# Patient Record
Sex: MALE | Race: WHITE | NOT HISPANIC OR LATINO | Employment: OTHER | URBAN - METROPOLITAN AREA
[De-identification: names, ages, dates, MRNs, and addresses within clinical notes are randomized per-mention and may not be internally consistent; named-entity substitution may affect disease eponyms.]

---

## 2017-01-05 DIAGNOSIS — Z12.5 ENCOUNTER FOR SCREENING FOR MALIGNANT NEOPLASM OF PROSTATE: ICD-10-CM

## 2017-01-05 DIAGNOSIS — E78.5 HYPERLIPIDEMIA: ICD-10-CM

## 2017-01-05 DIAGNOSIS — K21.9 GASTRO-ESOPHAGEAL REFLUX DISEASE WITHOUT ESOPHAGITIS: ICD-10-CM

## 2017-01-05 DIAGNOSIS — Z00.00 ENCOUNTER FOR GENERAL ADULT MEDICAL EXAMINATION WITHOUT ABNORMAL FINDINGS: ICD-10-CM

## 2017-01-05 DIAGNOSIS — M72.2 PLANTAR FASCIAL FIBROMATOSIS: ICD-10-CM

## 2017-01-16 ENCOUNTER — APPOINTMENT (OUTPATIENT)
Dept: LAB | Facility: CLINIC | Age: 67
End: 2017-01-16
Payer: MEDICARE

## 2017-01-16 ENCOUNTER — TRANSCRIBE ORDERS (OUTPATIENT)
Dept: RADIOLOGY | Facility: CLINIC | Age: 67
End: 2017-01-16

## 2017-01-16 ENCOUNTER — TRANSCRIBE ORDERS (OUTPATIENT)
Dept: LAB | Facility: CLINIC | Age: 67
End: 2017-01-16

## 2017-01-16 DIAGNOSIS — K21.9 GASTRO-ESOPHAGEAL REFLUX DISEASE WITHOUT ESOPHAGITIS: ICD-10-CM

## 2017-01-16 DIAGNOSIS — Z12.5 ENCOUNTER FOR SCREENING FOR MALIGNANT NEOPLASM OF PROSTATE: ICD-10-CM

## 2017-01-16 DIAGNOSIS — E78.5 HYPERLIPIDEMIA: ICD-10-CM

## 2017-01-16 DIAGNOSIS — M72.2 PLANTAR FASCIAL FIBROMATOSIS: ICD-10-CM

## 2017-01-16 LAB
ALBUMIN SERPL BCP-MCNC: 3.7 G/DL (ref 3.5–5)
ALP SERPL-CCNC: 92 U/L (ref 46–116)
ALT SERPL W P-5'-P-CCNC: 161 U/L (ref 12–78)
AST SERPL W P-5'-P-CCNC: 69 U/L (ref 5–45)
BILIRUB DIRECT SERPL-MCNC: 0.2 MG/DL (ref 0–0.2)
BILIRUB SERPL-MCNC: 0.67 MG/DL (ref 0.2–1)
CHOLEST SERPL-MCNC: 116 MG/DL (ref 50–200)
HDLC SERPL-MCNC: 39 MG/DL (ref 40–60)
LDLC SERPL CALC-MCNC: 64 MG/DL (ref 0–100)
PROT SERPL-MCNC: 7.5 G/DL (ref 6.4–8.2)
PSA SERPL-MCNC: 0.4 NG/ML (ref 0–4)
TRIGL SERPL-MCNC: 65 MG/DL

## 2017-01-16 PROCEDURE — G0103 PSA SCREENING: HCPCS

## 2017-01-16 PROCEDURE — 80061 LIPID PANEL: CPT

## 2017-01-16 PROCEDURE — 80076 HEPATIC FUNCTION PANEL: CPT

## 2017-01-16 PROCEDURE — 36415 COLL VENOUS BLD VENIPUNCTURE: CPT

## 2017-01-19 ENCOUNTER — ALLSCRIPTS OFFICE VISIT (OUTPATIENT)
Dept: OTHER | Facility: OTHER | Age: 67
End: 2017-01-19

## 2017-01-30 ENCOUNTER — ALLSCRIPTS OFFICE VISIT (OUTPATIENT)
Dept: OTHER | Facility: OTHER | Age: 67
End: 2017-01-30

## 2017-02-03 ENCOUNTER — HOSPITAL ENCOUNTER (OUTPATIENT)
Dept: CT IMAGING | Facility: CLINIC | Age: 67
Discharge: HOME | End: 2017-02-03
Payer: COMMERCIAL

## 2017-02-03 ENCOUNTER — APPOINTMENT (OUTPATIENT)
Dept: LAB | Facility: CLINIC | Age: 67
End: 2017-02-03
Payer: MEDICARE

## 2017-02-03 DIAGNOSIS — Z00.00 ENCOUNTER FOR GENERAL ADULT MEDICAL EXAMINATION WITHOUT ABNORMAL FINDINGS: ICD-10-CM

## 2017-02-03 DIAGNOSIS — E78.5 HYPERLIPIDEMIA: ICD-10-CM

## 2017-02-03 LAB
ALBUMIN SERPL BCP-MCNC: 3.6 G/DL (ref 3.5–5)
ALP SERPL-CCNC: 70 U/L (ref 46–116)
ALT SERPL W P-5'-P-CCNC: 44 U/L (ref 12–78)
ANION GAP SERPL CALCULATED.3IONS-SCNC: 9 MMOL/L (ref 4–13)
AST SERPL W P-5'-P-CCNC: 24 U/L (ref 5–45)
BILIRUB SERPL-MCNC: 0.62 MG/DL (ref 0.2–1)
BUN SERPL-MCNC: 14 MG/DL (ref 5–25)
CALCIUM SERPL-MCNC: 9.5 MG/DL (ref 8.3–10.1)
CHLORIDE SERPL-SCNC: 101 MMOL/L (ref 100–108)
CO2 SERPL-SCNC: 27 MMOL/L (ref 21–32)
CREAT SERPL-MCNC: 0.86 MG/DL (ref 0.6–1.3)
GFR SERPL CREATININE-BSD FRML MDRD: >60 ML/MIN/1.73SQ M
GLUCOSE SERPL-MCNC: 96 MG/DL (ref 65–140)
POTASSIUM SERPL-SCNC: 4 MMOL/L (ref 3.5–5.3)
PROT SERPL-MCNC: 7.6 G/DL (ref 6.4–8.2)
SODIUM SERPL-SCNC: 137 MMOL/L (ref 136–145)

## 2017-02-03 PROCEDURE — 36415 COLL VENOUS BLD VENIPUNCTURE: CPT

## 2017-02-03 PROCEDURE — 80053 COMPREHEN METABOLIC PANEL: CPT

## 2017-02-04 ENCOUNTER — GENERIC CONVERSION - ENCOUNTER (OUTPATIENT)
Dept: OTHER | Facility: OTHER | Age: 67
End: 2017-02-04

## 2017-03-14 ENCOUNTER — APPOINTMENT (OUTPATIENT)
Dept: PHYSICAL THERAPY | Facility: CLINIC | Age: 67
End: 2017-03-14
Payer: MEDICARE

## 2017-03-15 ENCOUNTER — APPOINTMENT (OUTPATIENT)
Dept: PHYSICAL THERAPY | Facility: CLINIC | Age: 67
End: 2017-03-15
Payer: MEDICARE

## 2017-03-15 PROCEDURE — G8979 MOBILITY GOAL STATUS: HCPCS

## 2017-03-15 PROCEDURE — 97161 PT EVAL LOW COMPLEX 20 MIN: CPT

## 2017-03-15 PROCEDURE — 97110 THERAPEUTIC EXERCISES: CPT

## 2017-03-15 PROCEDURE — G8978 MOBILITY CURRENT STATUS: HCPCS

## 2017-03-21 ENCOUNTER — APPOINTMENT (OUTPATIENT)
Dept: PHYSICAL THERAPY | Facility: CLINIC | Age: 67
End: 2017-03-21
Payer: MEDICARE

## 2017-03-21 PROCEDURE — 97140 MANUAL THERAPY 1/> REGIONS: CPT

## 2017-03-21 PROCEDURE — 97110 THERAPEUTIC EXERCISES: CPT

## 2017-03-23 ENCOUNTER — APPOINTMENT (OUTPATIENT)
Dept: PHYSICAL THERAPY | Facility: CLINIC | Age: 67
End: 2017-03-23
Payer: MEDICARE

## 2017-03-23 PROCEDURE — 97140 MANUAL THERAPY 1/> REGIONS: CPT

## 2017-03-23 PROCEDURE — 97110 THERAPEUTIC EXERCISES: CPT

## 2017-03-29 ENCOUNTER — GENERIC CONVERSION - ENCOUNTER (OUTPATIENT)
Dept: OTHER | Facility: OTHER | Age: 67
End: 2017-03-29

## 2017-03-30 ENCOUNTER — APPOINTMENT (OUTPATIENT)
Dept: PHYSICAL THERAPY | Facility: CLINIC | Age: 67
End: 2017-03-30
Payer: MEDICARE

## 2017-03-30 PROCEDURE — 97140 MANUAL THERAPY 1/> REGIONS: CPT

## 2017-03-30 PROCEDURE — 97535 SELF CARE MNGMENT TRAINING: CPT

## 2017-03-30 PROCEDURE — 97110 THERAPEUTIC EXERCISES: CPT

## 2017-04-03 ENCOUNTER — APPOINTMENT (OUTPATIENT)
Dept: PHYSICAL THERAPY | Facility: CLINIC | Age: 67
End: 2017-04-03
Payer: MEDICARE

## 2017-04-03 PROCEDURE — 97140 MANUAL THERAPY 1/> REGIONS: CPT

## 2017-04-03 PROCEDURE — 97110 THERAPEUTIC EXERCISES: CPT

## 2017-04-06 ENCOUNTER — APPOINTMENT (OUTPATIENT)
Dept: PHYSICAL THERAPY | Facility: CLINIC | Age: 67
End: 2017-04-06
Payer: MEDICARE

## 2017-04-06 PROCEDURE — 97110 THERAPEUTIC EXERCISES: CPT

## 2017-04-06 PROCEDURE — 97140 MANUAL THERAPY 1/> REGIONS: CPT

## 2017-04-11 ENCOUNTER — APPOINTMENT (OUTPATIENT)
Dept: PHYSICAL THERAPY | Facility: CLINIC | Age: 67
End: 2017-04-11
Payer: MEDICARE

## 2017-04-11 PROCEDURE — 97110 THERAPEUTIC EXERCISES: CPT

## 2017-04-11 PROCEDURE — 97140 MANUAL THERAPY 1/> REGIONS: CPT

## 2017-04-12 ENCOUNTER — APPOINTMENT (OUTPATIENT)
Dept: PHYSICAL THERAPY | Facility: CLINIC | Age: 67
End: 2017-04-12
Payer: COMMERCIAL

## 2017-04-12 PROCEDURE — L3010 FOOT LONGITUDINAL ARCH SUPPO: HCPCS | Performed by: PHYSICAL THERAPIST

## 2017-04-13 ENCOUNTER — APPOINTMENT (OUTPATIENT)
Dept: PHYSICAL THERAPY | Facility: CLINIC | Age: 67
End: 2017-04-13
Payer: MEDICARE

## 2017-04-13 PROCEDURE — G8978 MOBILITY CURRENT STATUS: HCPCS

## 2017-04-13 PROCEDURE — 97164 PT RE-EVAL EST PLAN CARE: CPT

## 2017-04-13 PROCEDURE — 97140 MANUAL THERAPY 1/> REGIONS: CPT

## 2017-04-13 PROCEDURE — G8979 MOBILITY GOAL STATUS: HCPCS

## 2017-04-13 PROCEDURE — 97110 THERAPEUTIC EXERCISES: CPT

## 2017-04-18 ENCOUNTER — APPOINTMENT (OUTPATIENT)
Dept: PHYSICAL THERAPY | Facility: CLINIC | Age: 67
End: 2017-04-18
Payer: MEDICARE

## 2017-04-18 PROCEDURE — 97110 THERAPEUTIC EXERCISES: CPT

## 2017-04-18 PROCEDURE — 97140 MANUAL THERAPY 1/> REGIONS: CPT

## 2017-04-24 ENCOUNTER — APPOINTMENT (OUTPATIENT)
Dept: PHYSICAL THERAPY | Facility: CLINIC | Age: 67
End: 2017-04-24
Payer: MEDICARE

## 2017-04-24 PROCEDURE — 97140 MANUAL THERAPY 1/> REGIONS: CPT

## 2017-04-24 PROCEDURE — 97110 THERAPEUTIC EXERCISES: CPT

## 2017-04-27 ENCOUNTER — APPOINTMENT (OUTPATIENT)
Dept: PHYSICAL THERAPY | Facility: CLINIC | Age: 67
End: 2017-04-27
Payer: MEDICARE

## 2017-04-27 PROCEDURE — 97110 THERAPEUTIC EXERCISES: CPT

## 2017-04-27 PROCEDURE — 97140 MANUAL THERAPY 1/> REGIONS: CPT

## 2017-05-31 ENCOUNTER — GENERIC CONVERSION - ENCOUNTER (OUTPATIENT)
Dept: OTHER | Facility: OTHER | Age: 67
End: 2017-05-31

## 2017-06-05 ENCOUNTER — APPOINTMENT (OUTPATIENT)
Dept: PHYSICAL THERAPY | Facility: CLINIC | Age: 67
End: 2017-06-05
Payer: MEDICARE

## 2017-06-05 PROCEDURE — 97110 THERAPEUTIC EXERCISES: CPT

## 2017-06-05 PROCEDURE — 97140 MANUAL THERAPY 1/> REGIONS: CPT

## 2017-06-05 PROCEDURE — G8978 MOBILITY CURRENT STATUS: HCPCS

## 2017-06-05 PROCEDURE — G8979 MOBILITY GOAL STATUS: HCPCS

## 2017-06-05 PROCEDURE — 97164 PT RE-EVAL EST PLAN CARE: CPT

## 2017-06-08 ENCOUNTER — APPOINTMENT (OUTPATIENT)
Dept: PHYSICAL THERAPY | Facility: CLINIC | Age: 67
End: 2017-06-08
Payer: MEDICARE

## 2017-06-08 PROCEDURE — 97110 THERAPEUTIC EXERCISES: CPT

## 2017-06-08 PROCEDURE — 97140 MANUAL THERAPY 1/> REGIONS: CPT

## 2017-06-12 ENCOUNTER — APPOINTMENT (OUTPATIENT)
Dept: PHYSICAL THERAPY | Facility: CLINIC | Age: 67
End: 2017-06-12
Payer: MEDICARE

## 2017-06-12 PROCEDURE — 97110 THERAPEUTIC EXERCISES: CPT

## 2017-06-12 PROCEDURE — 97140 MANUAL THERAPY 1/> REGIONS: CPT

## 2017-06-15 ENCOUNTER — APPOINTMENT (OUTPATIENT)
Dept: PHYSICAL THERAPY | Facility: CLINIC | Age: 67
End: 2017-06-15
Payer: MEDICARE

## 2017-06-15 PROCEDURE — 97110 THERAPEUTIC EXERCISES: CPT

## 2017-06-15 PROCEDURE — 97140 MANUAL THERAPY 1/> REGIONS: CPT

## 2017-06-16 ENCOUNTER — ALLSCRIPTS OFFICE VISIT (OUTPATIENT)
Dept: OTHER | Facility: OTHER | Age: 67
End: 2017-06-16

## 2017-06-20 ENCOUNTER — APPOINTMENT (OUTPATIENT)
Dept: PHYSICAL THERAPY | Facility: CLINIC | Age: 67
End: 2017-06-20
Payer: MEDICARE

## 2017-06-20 PROCEDURE — 97110 THERAPEUTIC EXERCISES: CPT

## 2017-06-20 PROCEDURE — 97140 MANUAL THERAPY 1/> REGIONS: CPT

## 2017-06-22 ENCOUNTER — APPOINTMENT (OUTPATIENT)
Dept: PHYSICAL THERAPY | Facility: CLINIC | Age: 67
End: 2017-06-22
Payer: MEDICARE

## 2017-06-22 PROCEDURE — 97110 THERAPEUTIC EXERCISES: CPT

## 2017-06-22 PROCEDURE — 97140 MANUAL THERAPY 1/> REGIONS: CPT

## 2017-06-29 ENCOUNTER — APPOINTMENT (OUTPATIENT)
Dept: PHYSICAL THERAPY | Facility: CLINIC | Age: 67
End: 2017-06-29
Payer: MEDICARE

## 2017-06-29 PROCEDURE — 97140 MANUAL THERAPY 1/> REGIONS: CPT

## 2017-06-29 PROCEDURE — 97110 THERAPEUTIC EXERCISES: CPT

## 2017-06-30 ENCOUNTER — APPOINTMENT (OUTPATIENT)
Dept: PHYSICAL THERAPY | Facility: CLINIC | Age: 67
End: 2017-06-30
Payer: MEDICARE

## 2017-06-30 PROCEDURE — G8979 MOBILITY GOAL STATUS: HCPCS

## 2017-06-30 PROCEDURE — 97164 PT RE-EVAL EST PLAN CARE: CPT

## 2017-06-30 PROCEDURE — G8978 MOBILITY CURRENT STATUS: HCPCS

## 2017-06-30 PROCEDURE — 97140 MANUAL THERAPY 1/> REGIONS: CPT

## 2017-07-06 ENCOUNTER — APPOINTMENT (OUTPATIENT)
Dept: PHYSICAL THERAPY | Facility: CLINIC | Age: 67
End: 2017-07-06
Payer: MEDICARE

## 2017-07-06 PROCEDURE — 97140 MANUAL THERAPY 1/> REGIONS: CPT

## 2017-07-06 PROCEDURE — 97110 THERAPEUTIC EXERCISES: CPT

## 2017-07-10 ENCOUNTER — APPOINTMENT (OUTPATIENT)
Dept: PHYSICAL THERAPY | Facility: CLINIC | Age: 67
End: 2017-07-10
Payer: MEDICARE

## 2017-07-10 PROCEDURE — 97140 MANUAL THERAPY 1/> REGIONS: CPT

## 2017-07-10 PROCEDURE — 97110 THERAPEUTIC EXERCISES: CPT

## 2017-07-14 ENCOUNTER — APPOINTMENT (OUTPATIENT)
Dept: PHYSICAL THERAPY | Facility: CLINIC | Age: 67
End: 2017-07-14
Payer: MEDICARE

## 2017-07-14 PROCEDURE — 97140 MANUAL THERAPY 1/> REGIONS: CPT

## 2017-07-14 PROCEDURE — 97110 THERAPEUTIC EXERCISES: CPT

## 2018-01-05 ENCOUNTER — GENERIC CONVERSION - ENCOUNTER (OUTPATIENT)
Dept: OTHER | Facility: OTHER | Age: 68
End: 2018-01-05

## 2018-01-10 NOTE — PROGRESS NOTES
Assessment    1  Encounter for preventive health examination (V70 0) (Z00 00)   2  Benign colon polyp (211 3) (K63 5)    Plan  Benign colon polyp, Encounter for screening colonoscopy    · COLONOSCOPY; Status:Active; Requested LII:04KGP0450;    · 2 - Valery Marr MD  (Gastroenterology) Physician Referral  Consult Only: the  expectation is that the referring provider will communicate back to the patient on  treatment options  Evaluation and Treatment: the expectation is that the referred to  provider will communicate back to the patient on treatment options  Status: Hold For  - Scheduling  Requested for: 23VEO3175  Care Summary provided  : Yes  Health Maintenance    · Alcohol misuse can be a problem with advancing age ; Status:Complete;   Done:  15FLA0176   · Always use a seat belt and shoulder strap when riding or driving a motor vehicle ;  Status:Complete;   Done: 98CHT3093   · Take steps to avoid hypothermia ; Status:Complete;   Done: 67AYA1202   · There are many exercise options for seniors ; Status:Complete;   Done: 95PHZ6440   · These are things you can do to prevent falls in and around the home ; Status:Complete;    Done: 57BIZ2764   · Use a sun block product with an SPF of 15 or more ; Status:Complete;   Done:  62PME6920   · We encourage all of our patients to exercise regularly  30 minutes of exercise or physical  activity five or more days a week is recommended for children and adults ;  Status:Complete;   Done: 08ODT9363   · We recommend routine visits to a dentist ; Status:Complete;   Done: 44RSU0465   · We recommend that you create an advance directive ; Status:Complete;   Done:  82GKC3036   · We recommend that you follow the "Mediterranean diet "; Status:Complete;   Done:  18CVR5481   · We recommend that you follow these rules for gun safety ; Status:Complete;   Done:  08QZA1713   · We recommend that you follow these steps to lower your risk of osteoporosis  ;  Status:Complete;   Done: 04FYB2706   · We recommend you modify your diet to achieve and maintain a healthy weight  Being  underweight may increase your risk of developing health problems from vitamin and  mineral deficiencies  We recommend a balanced diet rich in fruits and vegetables  You  may also consider increasing your calorie intake by eating more frequently or adding  nuts, avocados, and low-fat cheese or milk to your meals  Please let us know  if you would like to learn more about your nutrition and calorie needs, and additional  options to help you achieve your weight goals ; Status:Complete;   Done: 41JNJ8337   · Call [phone] if: You have any warning signs of skin cancer ; Status:Complete;    Done: 84ZED0078   · Call 911 if: You experience a new kind of chest pain (angina) or pressure ;  Status:Complete;   Done: 10FBS9823   · * CT LUNG SCREENING PROGRAM; Status:Hold For - Scheduling; Requested  for:19Jan2017;    · Follow-up visit in 6 months Evaluation and Treatment  Follow-up  Status: Hold For -  Scheduling  Requested for: 42DSM4996  Hyperlipidemia    · (1) COMPREHENSIVE METABOLIC PANEL; Status:Active; Requested CJZ:78LHX0986;   SocHx: Former smoker, GERD without esophagitis    · EGD; Status:Hold For - Scheduling; Requested for:19Jan2017;     Discussion/Summary    70-year-old male here today for initial preventative physical examination  He has had all the usual preventive services with the exception of colonoscopy and I am ordering  He declines a flu shot his other immunizations are up-to-date  I'm ordering an EGD as she has chronic GERD and history of smoking  I reviewed his recent medications and I am ordering a comprehensive metabolic panel because of an elevated liver enzyme on his recent hepatic panel  Examination performed  He follows with the urologist  I reviewed his medications we'll make no change  Will reappoint in 6 months for checkup 12 months for annual wellness visit  Impression: Welcome to Medicare Visit  Cardiovascular screening and counseling: the risks and benefits of screening were discussed, screening is current and EKG recommended  Diabetes screening and counseling: the risks and benefits of screening were discussed and screening is current  Colorectal cancer screening and counseling: the risks and benefits of screening were discussed and due for a colonoscopy (low risk)  Prostate cancer screening and counseling: the risks and benefits of screening were discussed and screening is current  Osteoporosis screening and counseling: the risks and benefits of screening were discussed and screening not indicated  Abdominal aortic aneurysm screening and counseling: the risks and benefits of screening were discussed and screening is current  Glaucoma screening and counseling: the risks and benefits of screening were discussed and screening is current  HIV screening and counseling: the risks and benefits of screening were discussed and screening not indicated  Immunizations: the risks and benefits of influenza vaccination were discussed with the patient, the patient declines the influenza vaccination, the risks and benefits of pneumococcal vaccination were discussed with the patient, will be due in a year for her #2, hepatitis B prevention counseling was provided, hepatitis B vaccination series is not indicated at this time due to the patient's low risk of abi the disease, the risks and benefits of the Zostavax vaccine were discussed with the patient, Zostavax vaccination up to date, the risks and benefits of the Tdap vaccine were discussed with the patient and Tdap vaccination up to date  Advance Directive Planning: complete and up to date  Patient Discussion: follow-up visit needed in 6  Chief Complaint  Welcome to Medicare Wellness exam 73year old  Advance Directives  Advance Directive [facility]:   YES - Patient has an advance health care directive     The patient has a living will located  a scanned copy is in the patient's chart  Durable Power of  For Healthcare:    Name: shey   Relationship: spouse      History of Present Illness  Welcome to [employer] and Wellness Visits: The patient is being seen for the welcome to medicare visit  Medicare Screening and Risk Factors   Hospitalizations: he has been previously hospitalizied and he has been hospitalized 11/16 fascial release times  Once per lifetime medicare screening tests: ECG (1o/28 16 wnl) and AAA screening US (3/11/15 wnl)  Medicare Screening Tests Risk Questions   Abdominal aortic aneurysm risk assessment: none indicated  Osteoporosis risk assessment: none indicated  HIV risk assessment: none indicated  Drug and Alcohol Use: The patient is a former cigarette smoker  The patient reports drinking 3 drinks per day  He has never used illicit drugs  Diet and Physical Activity: Current diet includes well balanced meals  He exercises 3 times per week  Exercise: walking, swimming, strength training 3 hours per week  Mood Disorder and Cognitive Impairment Screening: Anxiety screening none  He denies feeling down, depressed, or hopeless over the past two weeks  He denies feeling little interest or pleasure in doing things over the past two weeks  Cognitive impairment screening: denies difficulty learning/retaining new information, denies difficulty handling complex tasks, denies difficulty with reasoning, denies difficulty with spatial ability and orientation, denies difficulty with language and denies difficulty with behavior  Functional Ability/Level of Safety: Hearing is normal bilaterally  The patient is currently able to do activities of daily living without limitations, able to do instrumental activities of daily living without limitations, able to participate in social activities without limitations and able to drive without limitations   Activities of daily living details: does not need help using the phone, no transportation help needed, does not need help shopping, no meal preparation help needed, does not need help doing laundry, does not need help managing medications and does not need help managing money  Fall risk factors: The patient fell 0 times in the past 12 months , no polypharmacy, no alcohol use, no mobility impairment, no antidepressant use, no deconditioning, no postural hypotension, no sedative use, no visual impairment, no urinary incontinence, no antihypertensive use, no cognitive impairment and no previous fall  Home safety risk factors:  no unfamiliar surroundings, no loose rugs, no poor household lighting, no uneven floors, no household clutter, grab bars in the bathroom and handrails on the stairs  Advance Directives: Advance directives: living will, durable power of  for health care directives and advance directives  end of life decisions were reviewed with the patient and I agree with the patient's decisions  Co-Managers and Medical Equipment/Suppliers: See Patient Care Team   Reviewed Updated [facility]:   Last Medicare Wellness Visit Information was reviewed, patient interviewed, no change since last AWV  Patient Care Team    Care Team Member Role Specialty Office Number   Oleg LESLIE  Orthopedic Surgery [phone]   Jasmine Adam MD  Urology [phone], Jae Servin MD  Orthopedic Surgery [phone]     Review of Systems    Constitutional: negative  Head and Face: negative  Eyes: negative  ENT: negative  Cardiovascular: negative  Respiratory: negative  Gastrointestinal: negative  Genitourinary: negative  Musculoskeletal: negative  Integumentary and Breasts: negative  Neurological: negative  Psychiatric: negative  Endocrine: negative  Hematologic and Lymphatic: negative  Active Problems    1  GERD without esophagitis (530 81) (K21 9)   2  Hyperlipidemia (272 4) (E78 5)   3  Peyronie's disease (607 85) (N48 6)   4   Plantar fascial fibromatosis (728 71) (M72 2)   5  Plantar fasciitis (728 71) (M72 2)    Past Medical History    · 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)   · History of Benign prostatic hypertrophy (600 00) (N40 0)   · History of Bilateral hand numbness (782 0) (R20 0)   · History of Carpal tunnel syndrome of left wrist (354 0) (G56 02)   · History of Carpal tunnel syndrome of right wrist (354 0) (G56 01)   · History of Contact dermatitis due to poison ivy (692 6) (L23 7)   · History of Ganglion (727 43) (M67 40)   · History of abdominal pain (V13 89) (O16 359)   · History of acute bronchitis (V12 69) (Z87 09)   · History of acute bronchitis (V12 69) (Z87 09)   · History of acute sinusitis (V12 69) (Z87 09)   · History of bladder infections (V13 02) (Z87 440)   · History of chronic prostatitis (V13 89) (M96 694)   · History of impacted cerumen (V12 49) (Z86 69)   · History of urethral stricture (V13 09) (Z87 448)   · History of urethral stricture (V13 09) (Z87 448)   · History of urinary tract infection (V13 02) (Z87 440)   · History of Impingement syndrome of right shoulder (726 2) (M75 41)   · History of Left inguinal hernia (550 90) (K40 90)   · History of Pneumonia of left upper lobe due to infectious organism (486) (J18 1)   · Right shoulder pain (719 41) (M25 511)   · History of Sexual dysfunction (302 70) (R37)   · History of Urinary complication (010 1) (K76 87)    The active problems and past medical history were reviewed and updated today  Surgical History    · History of Cystoscopy For Urethral Stricture   · History of Diagnostic Cystoscopy   · History of Hernia Repair   · History of Neuroplasty Decompression Median Nerve At Carpal Tunnel    The surgical history was reviewed and updated today         Family History  Mother    · Family history of Family Health Status Of Mother -   Father    · Family history of Bladder Cancer (V16 52)   · Family history of Family Health Status Of Father -     The family history was reviewed and updated today  Social History    · Being A Social Drinker   · History of Former smoker (V01 87) (A15 399)   · Former smoker (V15 82) (T33 451)   · Marital History - Currently    · No drug use   · Working Full Time  The social history was reviewed and updated today  The social history was reviewed and is unchanged  Current Meds   1  Esomeprazole Magnesium 40 MG Oral Capsule Delayed Release; TAKE 1 CAPSULE   DAILY AS NEEDED; Therapy: 71JIW9327 to (Evaluate:2017)  Requested for: 97YGJ8249; Last   Rx:57Tdn4204 Ordered   2  Simvastatin 40 MG Oral Tablet; TAKE 1 TABLET BY MOUTH DAILY AT BEDTIME; Therapy: 13PBL7872 to (Evaluate:16Lam6103)  Requested for: 29Kpi4210; Last   Rx:75Ecu1086 Ordered    The medication list was reviewed and updated today  Allergies    1  No Known Drug Allergies    2  No Known Environmental Allergies   3  No Known Food Allergies    Immunizations   1 2    Influenza  Temporarily Deferred: Pt refuses Temporarily Deferred: Pt refuses, As of: 25WFS2219, Defer for 1 Years    PPSV  2016     Tdap  2012     Zoster  08-Sep-2014      Vitals  Signs    Systolic: 363  Diastolic: 80   Heart Rate: 68  Systolic: 034, RUE, Sitting  Diastolic: 94, RUE, Sitting  Height: 5 ft 9 in  Weight: 230 lb 8 0 oz  BMI Calculated: 34 04  BSA Calculated: 2 19    Physical Exam    Constitutional   General appearance: Abnormal   overweight and appearance reflects stated age  Head and Face   Head and face: Normal     Palpation of the face and sinuses: No sinus tenderness  Eyes   Conjunctiva and lids: No erythema, swelling or discharge  Pupils and irises: Equal, round, reactive to light  Ears, Nose, Mouth, and Throat   External inspection of ears and nose: Normal     Otoscopic examination: Tympanic membranes translucent with normal light reflex  Canals patent without erythema      Hearing: Normal     Nasal mucosa, septum, and turbinates: Normal without edema or erythema  Lips, teeth, and gums: Normal, good dentition  Oropharynx: Normal with no erythema, edema, exudate or lesions  Neck   Neck: Supple, symmetric, trachea midline, no masses  Thyroid: Normal, no thyromegaly  Pulmonary   Auscultation of lungs: Clear to auscultation  Cardiovascular   Auscultation of heart: Normal rate and rhythm, normal S1 and S2, no murmurs  Carotid pulses: 2+ bilaterally  Abdominal aorta: Normal     Femoral pulses: 2+ bilaterally  Pedal pulses: 2+ bilaterally  Peripheral vascular exam: Normal     Examination of extremities for edema and/or varicosities: Normal     Abdomen   Abdomen: Non-tender, no masses  Liver and spleen: No hepatomegaly or splenomegaly  Examination for hernias: No hernias appreciated  Genitourinary   Scrotal contents: Normal testes, no masses  Penis: Normal, no lesions  Lymphatic   Palpation of lymph nodes in neck: No lymphadenopathy  Palpation of lymph nodes in axillae: No lymphadenopathy  Palpation of lymph nodes in groin: No lymphadenopathy  Palpation of lymph nodes in other areas: No lymphadenopathy  Musculoskeletal   Gait and station: Normal     Inspection/palpation of digits and nails: Normal without clubbing or cyanosis  Inspection/palpation of joints, bones, and muscles: Normal     Range of motion: Normal     Stability: Normal     Muscle strength/tone: Normal     Skin   Skin and subcutaneous tissue: Normal without rashes or lesions  Palpation of skin and subcutaneous tissue: Normal turgor  Neurologic   Cranial nerves: Cranial nerves 2-12 intact  Cortical function: Normal mental status  Reflexes: 2+ and symmetric  Sensation: No sensory loss  Coordination: Normal finger to nose and heel to shin  Psychiatric   Judgment and insight: Normal     Orientation to person, place and time: Normal     Recent and remote memory: Intact      Mood and affect: Normal  Signatures   Electronically signed by : ROSCOE Camara ; Jan 19 2017  4:37PM EST                       (Author)

## 2018-01-11 ENCOUNTER — TRANSCRIBE ORDERS (OUTPATIENT)
Dept: ADMINISTRATIVE | Facility: HOSPITAL | Age: 68
End: 2018-01-11

## 2018-01-11 ENCOUNTER — HOSPITAL ENCOUNTER (OUTPATIENT)
Dept: NON INVASIVE DIAGNOSTICS | Facility: HOSPITAL | Age: 68
Discharge: HOME | End: 2018-01-11
Attending: ORTHOPAEDIC SURGERY
Payer: MEDICARE

## 2018-01-11 ENCOUNTER — LAB (OUTPATIENT)
Dept: LAB | Facility: HOSPITAL | Age: 68
End: 2018-01-11
Attending: ORTHOPAEDIC SURGERY
Payer: MEDICARE

## 2018-01-11 ENCOUNTER — GENERIC CONVERSION - ENCOUNTER (OUTPATIENT)
Dept: OTHER | Facility: OTHER | Age: 68
End: 2018-01-11

## 2018-01-11 DIAGNOSIS — G56.02 CARPAL TUNNEL SYNDROME ON LEFT: Primary | ICD-10-CM

## 2018-01-11 DIAGNOSIS — G56.02 CARPAL TUNNEL SYNDROME ON LEFT: ICD-10-CM

## 2018-01-11 LAB
ANION GAP SERPL CALCULATED.3IONS-SCNC: 1 MMOL/L (ref 4–13)
BASOPHILS # BLD AUTO: 0.05 THOUSANDS/ΜL (ref 0–0.1)
BASOPHILS NFR BLD AUTO: 1 % (ref 0–1)
BUN SERPL-MCNC: 17 MG/DL (ref 5–25)
CALCIUM SERPL-MCNC: 9 MG/DL (ref 8.3–10.1)
CHLORIDE SERPL-SCNC: 102 MMOL/L (ref 100–108)
CO2 SERPL-SCNC: 32 MMOL/L (ref 21–32)
CREAT SERPL-MCNC: 0.92 MG/DL (ref 0.6–1.3)
EOSINOPHIL # BLD AUTO: 0.09 THOUSAND/ΜL (ref 0–0.61)
EOSINOPHIL NFR BLD AUTO: 1 % (ref 0–6)
ERYTHROCYTE [DISTWIDTH] IN BLOOD BY AUTOMATED COUNT: 13.8 % (ref 11.6–15.1)
GFR SERPL CREATININE-BSD FRML MDRD: 86 ML/MIN/1.73SQ M
GLUCOSE P FAST SERPL-MCNC: 99 MG/DL (ref 65–99)
HCT VFR BLD AUTO: 45.8 % (ref 36.5–49.3)
HGB BLD-MCNC: 15.3 G/DL (ref 12–17)
LYMPHOCYTES # BLD AUTO: 2.69 THOUSANDS/ΜL (ref 0.6–4.47)
LYMPHOCYTES NFR BLD AUTO: 35 % (ref 14–44)
MCH RBC QN AUTO: 31.2 PG (ref 26.8–34.3)
MCHC RBC AUTO-ENTMCNC: 33.4 G/DL (ref 31.4–37.4)
MCV RBC AUTO: 93 FL (ref 82–98)
MONOCYTES # BLD AUTO: 0.64 THOUSAND/ΜL (ref 0.17–1.22)
MONOCYTES NFR BLD AUTO: 8 % (ref 4–12)
NEUTROPHILS # BLD AUTO: 4.13 THOUSANDS/ΜL (ref 1.85–7.62)
NEUTS SEG NFR BLD AUTO: 55 % (ref 43–75)
PLATELET # BLD AUTO: 265 THOUSANDS/UL (ref 149–390)
PMV BLD AUTO: 10.5 FL (ref 8.9–12.7)
POTASSIUM SERPL-SCNC: 4 MMOL/L (ref 3.5–5.3)
RBC # BLD AUTO: 4.91 MILLION/UL (ref 3.88–5.62)
SODIUM SERPL-SCNC: 135 MMOL/L (ref 136–145)
WBC # BLD AUTO: 7.6 THOUSAND/UL (ref 4.31–10.16)

## 2018-01-11 PROCEDURE — 93005 ELECTROCARDIOGRAM TRACING: CPT

## 2018-01-11 PROCEDURE — 36415 COLL VENOUS BLD VENIPUNCTURE: CPT

## 2018-01-11 PROCEDURE — 80048 BASIC METABOLIC PNL TOTAL CA: CPT

## 2018-01-11 PROCEDURE — 85025 COMPLETE CBC W/AUTO DIFF WBC: CPT

## 2018-01-12 VITALS
WEIGHT: 229 LBS | HEART RATE: 68 BPM | DIASTOLIC BLOOD PRESSURE: 80 MMHG | HEIGHT: 69 IN | SYSTOLIC BLOOD PRESSURE: 130 MMHG | TEMPERATURE: 98 F | BODY MASS INDEX: 33.92 KG/M2

## 2018-01-12 LAB
ATRIAL RATE: 85 BPM
P AXIS: 32 DEGREES
PR INTERVAL: 148 MS
QRS AXIS: 48 DEGREES
QRSD INTERVAL: 70 MS
QT INTERVAL: 376 MS
QTC INTERVAL: 447 MS
T WAVE AXIS: 58 DEGREES
VENTRICULAR RATE: 85 BPM

## 2018-01-14 VITALS
SYSTOLIC BLOOD PRESSURE: 130 MMHG | BODY MASS INDEX: 34.14 KG/M2 | DIASTOLIC BLOOD PRESSURE: 80 MMHG | HEIGHT: 69 IN | HEART RATE: 68 BPM | WEIGHT: 230.5 LBS

## 2018-01-15 VITALS
BODY MASS INDEX: 34.36 KG/M2 | RESPIRATION RATE: 17 BRPM | HEIGHT: 69 IN | DIASTOLIC BLOOD PRESSURE: 80 MMHG | TEMPERATURE: 97.8 F | SYSTOLIC BLOOD PRESSURE: 130 MMHG | HEART RATE: 72 BPM | WEIGHT: 232 LBS

## 2018-01-16 NOTE — PROGRESS NOTES
Assessment    1  Acute bronchitis (466 0) (J20 9)    Plan  Acute bronchitis    · Cefuroxime Axetil 500 MG Oral Tablet; Take 1 tablet twice daily   · Hydromet 5-1 5 MG/5ML Oral Syrup; TAKE 5 ML EVERY 4 TO 6 HOURS AS  NEEDED   · Drink at least 6 glasses of clear liquids a day ; Status:Complete;   Done: 70CCM4655   · Use a cool mist humidifier in the room ; Status:Complete;   Done: 69HJA0984   · Call [phone] if: The cough is not gone in 10 days ; Status:Complete;   Done:  78SPZ4288    Discussion/Summary    Patient has acute bronchitis  He will rest and drink lots of fluids I'm giving him an antibiotic and a cough suppressant  He will call me if he is not getting better  Chief Complaint  Coughing, chest discomfort, some shortness of breath noticed in last 3 days  History of Present Illness  HPI: Started several days ago with upper respiratory symptoms  Sore throat congestion and now has a persisting cough      Review of Systems    Constitutional: fever  ENT: nosebleeds  Cardiovascular: no chest pain  Respiratory: cough  Musculoskeletal: no complaints of arthralgia, no myalgia, no joint swelling or stiffness, no limb pain or swelling  Integumentary: no complaints of skin rash or lesion, no itching or dry skin, no skin wounds  Active Problems    1  Esophageal reflux (530 81) (K21 9)   2  Hyperlipidemia (272 4) (E78 5)   3  Peyronie's disease (607 85) (N48 6)    Past Medical History    1  History of Benign prostatic hypertrophy (600 00) (N40 0)   2  History of Ganglion (727 43) (M67 40)   3  History of abdominal pain (V13 89) (Z87 898)   4  History of acute bronchitis (V12 69) (Z87 09)   5  History of acute sinusitis (V12 69) (Z87 09)   6  History of bladder infections (V13 02) (Z87 440)   7  History of chronic prostatitis (V13 89) (Z87 438)   8  History of impacted cerumen (V12 49) (Z86 69)   9  History of urethral stricture (V13 09) (Z87 448)   10   History of urethral stricture (V13 09) (Z87 448)   11  History of urinary tract infection (V13 02) (Z87 440)   12  History of Left inguinal hernia (550 90) (K40 90)   13  History of Sexual dysfunction (302 70) (R37)   14  History of Special screening examination for neoplasm of prostate (V76 44) (Z12 5)   15  History of Urinary complication (490 5) (D61 32)   16  History of Vaccine for VZV (varicella-zoster virus) (V05 4) (Z23)    Family History    1  Family history of Family Health Status Of Mother -     2  Family history of Bladder Cancer (V16 52)   3  Family history of Family Health Status Of Father -   Family History Reviewed: The family history was reviewed and updated today  Social History    · Being A Social Drinker   · Former smoker (B20 53) (Q34 144)   · Marital History - Currently    · No drug use   · Working Full Time  The social history was reviewed and updated today  The social history was reviewed and is unchanged  Surgical History    1  History of Cystoscopy For Urethral Stricture   2  History of Diagnostic Cystoscopy   3  History of Hernia Repair    Current Meds   1  NexIUM 40 MG Oral Capsule Delayed Release; one daily before breakfast;   Therapy: 23HDK9140 to (Evaluate:19Xjx3606)  Requested for: 11XCB4937; Last   Rx:2016 Ordered   2  Simvastatin 40 MG Oral Tablet; TAKE 1 TABLET BY MOUTH DAILY AT BEDTIME; Therapy: 90YDY6581 to (Sebastien Grewal)  Requested for: 85GVA3513; Last   Rx:2016 Ordered    Allergies    1  No Known Drug Allergies    2  No Known Environmental Allergies   3  No Known Food Allergies    Vitals   Recorded: 62EJX4106 10:56AM   Heart Rate 80   Systolic 033, LUE, Sitting   Diastolic 90, LUE, Sitting   Height 5 ft 9 in   Weight 223 lb 6 08 oz   BMI Calculated 32 99   BSA Calculated 2 16     Physical Exam    Constitutional   General appearance: Abnormal   acutely ill and appearance reflects stated age     Ears, Nose, Mouth, and Throat   External inspection of ears and nose: Normal     Nasal mucosa, septum, and turbinates: Normal without edema or erythema  Oropharynx: Abnormal   The posterior pharynx was erythematous  Pulmonary   Auscultation of lungs: Abnormal   Auscultation of the lungs revealed decreased breath sounds diffusely  diffuse rhonchi bilaterally  Cardiovascular   Auscultation of heart: Normal rate and rhythm, normal S1 and S2, without murmurs  Examination of extremities for edema and/or varicosities: Normal     Carotid pulses: Normal     Abdomen   Abdomen: Non-tender, no masses  Liver and spleen: No hepatomegaly or splenomegaly  Future Appointments    Date/Time Provider Specialty Site   04/06/2016 11:00 AM ROSCOE Tesfaye  [facility] [place]   05/20/2016 08:30 AM ROSCOE Oconnell   Urology [address] [place] [address]     Signatures   Electronically signed by : ROSCOE Mehta ; Jan 26 2016 11:34AM EST                       (Author)

## 2018-01-18 NOTE — MISCELLANEOUS
Message   Recorded as Task   Date: 02/03/2017 04:16 PM, Created By: Micki Morton   Task Name: Medical Complaint Callback   Assigned To: Yahaira Diaz   Regarding Patient: Manuel Quach, Status: Active   CommentVasile Murray - 03 Feb 2017 4:16 PM     TASK CREATED  Tell and his CT lung scan was okay  Tell him we should do this again next year   Indiahomaisabel Ga - 03 Feb 2017 4:30 PM     TASK EDITED  Left message for patient to call the office on Saturday morning  Yahaira Graham - 04 Feb 2017 8:12 AM     TASK EDITED  Patient advised  PLM        Active Problems    1  Acute laryngotracheitis (464 20) (J04 2)   2  Benign colon polyp (211 3) (K63 5)   3  Encounter for screening colonoscopy (V76 51) (Z12 11)   4  Former smoker (V15 82) (V04 871)   5  GERD without esophagitis (530 81) (K21 9)   6  Hyperlipidemia (272 4) (E78 5)   7  Peyronie's disease (607 85) (N48 6)   8  Plantar fascial fibromatosis (728 71) (M72 2)   9  Plantar fasciitis (728 71) (M72 2)    Current Meds   1  Cefuroxime Axetil 500 MG Oral Tablet; Take 1 tablet twice daily; Therapy: 18JWF1390 to (Last Rx:30Jan2017)  Requested for: 30Jan2017 Ordered   2  Esomeprazole Magnesium 40 MG Oral Capsule Delayed Release; TAKE 1 CAPSULE   DAILY AS NEEDED; Therapy: 72QKW8440 to (Evaluate:14Kmm5952)  Requested for: 01JGX9517; Last   Rx:93Yyo5964 Ordered   3  Simvastatin 40 MG Oral Tablet; TAKE 1 TABLET BY MOUTH DAILY AT BEDTIME; Therapy: 16HSK9023 to (Evaluate:38Ftv7286)  Requested for: 25Gup2154; Last   Rx:42Dnl0840 Ordered    Allergies    1  No Known Drug Allergies    2  No Known Environmental Allergies   3   No Known Food Allergies    Signatures   Electronically signed by : Jl Smith, ; Feb 4 2017  8:12AM EST                       (Author)

## 2018-01-24 ENCOUNTER — HOSPITAL ENCOUNTER (OUTPATIENT)
Facility: HOSPITAL | Age: 68
Setting detail: OUTPATIENT SURGERY
Discharge: HOME | End: 2018-01-24
Attending: ORTHOPAEDIC SURGERY | Admitting: ORTHOPAEDIC SURGERY
Payer: MEDICARE

## 2018-01-24 ENCOUNTER — ANESTHESIA (OUTPATIENT)
Dept: PERIOP | Facility: HOSPITAL | Age: 68
End: 2018-01-24
Payer: MEDICARE

## 2018-01-24 ENCOUNTER — ANESTHESIA EVENT (OUTPATIENT)
Dept: PERIOP | Facility: HOSPITAL | Age: 68
End: 2018-01-24
Payer: MEDICARE

## 2018-01-24 VITALS
DIASTOLIC BLOOD PRESSURE: 82 MMHG | HEIGHT: 69 IN | WEIGHT: 236 LBS | SYSTOLIC BLOOD PRESSURE: 118 MMHG | HEART RATE: 80 BPM | BODY MASS INDEX: 34.96 KG/M2

## 2018-01-24 VITALS
DIASTOLIC BLOOD PRESSURE: 86 MMHG | WEIGHT: 227 LBS | HEART RATE: 84 BPM | TEMPERATURE: 97.5 F | BODY MASS INDEX: 33.62 KG/M2 | OXYGEN SATURATION: 98 % | HEIGHT: 69 IN | RESPIRATION RATE: 20 BRPM | SYSTOLIC BLOOD PRESSURE: 170 MMHG

## 2018-01-24 DIAGNOSIS — M67.432 GANGLION OF LEFT WRIST: ICD-10-CM

## 2018-01-24 DIAGNOSIS — G56.02 CARPAL TUNNEL SYNDROME OF LEFT WRIST: ICD-10-CM

## 2018-01-24 PROCEDURE — 88304 TISSUE EXAM BY PATHOLOGIST: CPT | Performed by: PATHOLOGY

## 2018-01-24 PROCEDURE — 88304 TISSUE EXAM BY PATHOLOGIST: CPT | Performed by: ORTHOPAEDIC SURGERY

## 2018-01-24 PROCEDURE — 64721 CARPAL TUNNEL SURGERY: CPT | Performed by: ORTHOPAEDIC SURGERY

## 2018-01-24 PROCEDURE — 25112 REREMOVE WRIST TENDON LESION: CPT | Performed by: ORTHOPAEDIC SURGERY

## 2018-01-24 RX ORDER — ONDANSETRON 2 MG/ML
4 INJECTION INTRAMUSCULAR; INTRAVENOUS ONCE AS NEEDED
Status: DISCONTINUED | OUTPATIENT
Start: 2018-01-24 | End: 2018-01-24 | Stop reason: HOSPADM

## 2018-01-24 RX ORDER — FENTANYL CITRATE/PF 50 MCG/ML
25 SYRINGE (ML) INJECTION
Status: DISCONTINUED | OUTPATIENT
Start: 2018-01-24 | End: 2018-01-24 | Stop reason: HOSPADM

## 2018-01-24 RX ORDER — SODIUM CHLORIDE, SODIUM LACTATE, POTASSIUM CHLORIDE, CALCIUM CHLORIDE 600; 310; 30; 20 MG/100ML; MG/100ML; MG/100ML; MG/100ML
20 INJECTION, SOLUTION INTRAVENOUS CONTINUOUS
Status: DISCONTINUED | OUTPATIENT
Start: 2018-01-24 | End: 2018-01-24 | Stop reason: HOSPADM

## 2018-01-24 RX ORDER — MIDAZOLAM HYDROCHLORIDE 1 MG/ML
INJECTION INTRAMUSCULAR; INTRAVENOUS AS NEEDED
Status: DISCONTINUED | OUTPATIENT
Start: 2018-01-24 | End: 2018-01-24 | Stop reason: SURG

## 2018-01-24 RX ORDER — FENTANYL CITRATE/PF 50 MCG/ML
25 SYRINGE (ML) INJECTION
Status: COMPLETED | OUTPATIENT
Start: 2018-01-24 | End: 2018-01-24

## 2018-01-24 RX ORDER — DIPHENHYDRAMINE HYDROCHLORIDE 50 MG/ML
12.5 INJECTION INTRAMUSCULAR; INTRAVENOUS ONCE AS NEEDED
Status: DISCONTINUED | OUTPATIENT
Start: 2018-01-24 | End: 2018-01-24 | Stop reason: HOSPADM

## 2018-01-24 RX ORDER — HYDROCODONE BITARTRATE AND ACETAMINOPHEN 5; 325 MG/1; MG/1
2 TABLET ORAL EVERY 6 HOURS PRN
Qty: 20 TABLET | Refills: 0 | Status: SHIPPED | OUTPATIENT
Start: 2018-01-24 | End: 2018-02-03

## 2018-01-24 RX ORDER — FENTANYL CITRATE 50 UG/ML
INJECTION, SOLUTION INTRAMUSCULAR; INTRAVENOUS AS NEEDED
Status: DISCONTINUED | OUTPATIENT
Start: 2018-01-24 | End: 2018-01-24 | Stop reason: SURG

## 2018-01-24 RX ORDER — PROPOFOL 10 MG/ML
INJECTION, EMULSION INTRAVENOUS AS NEEDED
Status: DISCONTINUED | OUTPATIENT
Start: 2018-01-24 | End: 2018-01-24 | Stop reason: SURG

## 2018-01-24 RX ORDER — BUPIVACAINE HYDROCHLORIDE AND EPINEPHRINE 5; 5 MG/ML; UG/ML
INJECTION, SOLUTION EPIDURAL; INTRACAUDAL; PERINEURAL AS NEEDED
Status: DISCONTINUED | OUTPATIENT
Start: 2018-01-24 | End: 2018-01-24 | Stop reason: HOSPADM

## 2018-01-24 RX ORDER — MAGNESIUM HYDROXIDE 1200 MG/15ML
LIQUID ORAL AS NEEDED
Status: DISCONTINUED | OUTPATIENT
Start: 2018-01-24 | End: 2018-01-24 | Stop reason: HOSPADM

## 2018-01-24 RX ORDER — METOCLOPRAMIDE HYDROCHLORIDE 5 MG/ML
10 INJECTION INTRAMUSCULAR; INTRAVENOUS ONCE AS NEEDED
Status: DISCONTINUED | OUTPATIENT
Start: 2018-01-24 | End: 2018-01-24 | Stop reason: HOSPADM

## 2018-01-24 RX ORDER — ONDANSETRON 2 MG/ML
INJECTION INTRAMUSCULAR; INTRAVENOUS AS NEEDED
Status: DISCONTINUED | OUTPATIENT
Start: 2018-01-24 | End: 2018-01-24 | Stop reason: SURG

## 2018-01-24 RX ORDER — KETOROLAC TROMETHAMINE 30 MG/ML
INJECTION, SOLUTION INTRAMUSCULAR; INTRAVENOUS AS NEEDED
Status: DISCONTINUED | OUTPATIENT
Start: 2018-01-24 | End: 2018-01-24 | Stop reason: SURG

## 2018-01-24 RX ORDER — HYDROCODONE BITARTRATE AND ACETAMINOPHEN 5; 325 MG/1; MG/1
1 TABLET ORAL EVERY 6 HOURS PRN
Status: DISCONTINUED | OUTPATIENT
Start: 2018-01-24 | End: 2018-01-24 | Stop reason: HOSPADM

## 2018-01-24 RX ADMIN — FENTANYL CITRATE 25 MCG: 50 INJECTION INTRAMUSCULAR; INTRAVENOUS at 14:34

## 2018-01-24 RX ADMIN — ONDANSETRON 4 MG: 2 INJECTION INTRAMUSCULAR; INTRAVENOUS at 12:45

## 2018-01-24 RX ADMIN — KETOROLAC TROMETHAMINE 30 MG: 30 INJECTION, SOLUTION INTRAMUSCULAR at 13:07

## 2018-01-24 RX ADMIN — FENTANYL CITRATE 25 MCG: 50 INJECTION INTRAMUSCULAR; INTRAVENOUS at 14:19

## 2018-01-24 RX ADMIN — CEFAZOLIN SODIUM 2000 MG: 2 SOLUTION INTRAVENOUS at 12:52

## 2018-01-24 RX ADMIN — HYDROCODONE BITARTRATE AND ACETAMINOPHEN 1 TABLET: 5; 325 TABLET ORAL at 15:01

## 2018-01-24 RX ADMIN — PROPOFOL 250 MG: 10 INJECTION, EMULSION INTRAVENOUS at 12:45

## 2018-01-24 RX ADMIN — MIDAZOLAM HYDROCHLORIDE 4 MG: 1 INJECTION, SOLUTION INTRAMUSCULAR; INTRAVENOUS at 12:45

## 2018-01-24 RX ADMIN — FENTANYL CITRATE 100 MCG: 50 INJECTION, SOLUTION INTRAMUSCULAR; INTRAVENOUS at 12:45

## 2018-01-24 RX ADMIN — FENTANYL CITRATE 25 MCG: 50 INJECTION INTRAMUSCULAR; INTRAVENOUS at 14:24

## 2018-01-24 RX ADMIN — SODIUM CHLORIDE, SODIUM LACTATE, POTASSIUM CHLORIDE, AND CALCIUM CHLORIDE 20 ML/HR: .6; .31; .03; .02 INJECTION, SOLUTION INTRAVENOUS at 12:32

## 2018-01-24 RX ADMIN — FENTANYL CITRATE 25 MCG: 50 INJECTION INTRAMUSCULAR; INTRAVENOUS at 14:29

## 2018-01-24 NOTE — ANESTHESIA POSTPROCEDURE EVALUATION
Post-Op Assessment Note      CV Status:  Stable    Mental Status:  Somnolent    Hydration Status:  Stable    PONV Controlled:  Controlled    Airway Patency:  Patent    Post Op Vitals Reviewed: Yes          Staff: Anesthesiologist           BP      Temp      Pulse     Resp      SpO2

## 2018-01-24 NOTE — DISCHARGE INSTRUCTIONS
-Leave dressings on and dry until follow-up  -Gentle range of motion of the fingers is encouraged  Work on making a complete fist, straightening and stretching the fingers out as far as possible every 1-2 hours  You may do this by using the muscles themselves, or even using the other hand to assist in the stretching   -Elevate the hand and ice it for pain and swelling  -You should use over-the-counter pain medications such as Tylenol, Ibuprofen, Advil, and Aleve for pain control  This should give you good pain control  If this is not enough you may add the prescribed pain medication   -Avoid any heavy lifting, pushing, or pulling for the 1st 3-4 weeks after surgery until the incision is completely healed

## 2018-01-24 NOTE — ANESTHESIA PREPROCEDURE EVALUATION
Review of Systems/Medical History  Patient summary reviewed  Chart reviewed      Cardiovascular   Pulmonary       GI/Hepatic            Endo/Other     GYN       Hematology   Musculoskeletal       Neurology   Psychology           Physical Exam    Airway    Mallampati score: I  TM Distance: >3 FB       Dental       Cardiovascular  Rhythm: regular, Rate: normal,     Pulmonary      Other Findings        Anesthesia Plan  ASA Score- 2     Anesthesia Type- general with ASA Monitors  Additional Monitors:   Airway Plan: LMA  Plan Factors-    Induction- intravenous  Postoperative Plan- Plan for postoperative opioid use  Informed Consent- Anesthetic plan and risks discussed with patient

## 2018-01-24 NOTE — DISCHARGE SUMMARY
[facility] Discharge Note  Sarah Ross, 78 y o  male  MRN: [id number]      Preoperative diagnosis: L CTS and VWG    Postoperative diagnosis: same    Procedure: L CTR and VWG excision    After procedure, patient was brought to PACU  Pain was controlled with IV and oral pain medication  Patient was discharged to home after cleared by anesthesia and when criteria was met  Condition: good    Discharge medications:   See after visit summary for reconciled discharge medications provided to patient and family  Please refer to discharge instructions for further information

## 2018-01-24 NOTE — H&P (VIEW-ONLY)
Assessment   1  Left carpal tunnel syndrome (354 0) (G56 02)   2  Ganglion cyst of volar aspect of left wrist (727 41) (Q96 961)    Discussion/Summary      The patient was seen and examined by Dr Dom Bhatti and myself  This is a 61-year-old male with left carpal tunnel syndrome and left wrist volar ganglion cyst  Findings and treatment options were discussed with the patient  Recommend left carpal tunnel release and resection of left wrist volar ganglion cyst  Risks, benefits and complications of the surgeries were discussed by Dr Dom Bhatti informed consent was obtained  All questions were answered to patient's satisfaction  documentation has been constructed using a voice recognition system  Chief Complaint   Follow-up left wrist      Post-Op   HPI: This is a 61-year-old male following up for left carpal tunnel syndrome  He is also complaining of a ganglion cyst over the volar aspect of his left wrist that has returned  He had a ganglion cyst resected by Dr Fatmata Fields and 2013  He had a right carpal tunnel release done by Dr Dom Bhatti on August 24, 2016  The symptoms in his left hand have been worsening and is ready to have surgery on that side now  He would also like to have the ganglion cyst removed again  Review of Systems        Constitutional: No fever or chills, feels well, no tiredness, no recent weight loss or weight gain  Eyes: No complaints of red eyes, no eyesight problems  ENT: no complaints of loss of hearing, no nosebleeds, no sore throat  Cardiovascular: No complaints of chest pain, no palpitations, no leg claudication or lower extremity edema  Respiratory: No complaints of shortness of breath, no wheezing, no cough  Gastrointestinal: No complaints of abdominal pain, no constipation, no nausea or vomiting, no diarrhea or bloody stools  Genitourinary: No complaints of dysuria or incontinence, no hesitancy, no nocturia  Musculoskeletal: as noted in HPI  Integumentary: No complaints of skin rash or lesion, no itching or dry skin, no skin wounds  Neurological: No complaints of headache, no confusion, no numbness or tingling, no dizziness  Psychiatric: No suicidal thoughts, no anxiety, no depression  Endocrine: No muscle weakness, no frequent urination, no excessive thirst, no feelings of weakness  ROS reviewed  Active Problems   1  Benign colon polyp (211 3) (K63 5)   2  Former smoker (V15 82) (I01 640)   3  GERD without esophagitis (530 81) (K21 9)   4  Hyperlipidemia (272 4) (E78 5)   5  Peyronie's disease (607 85) (N48 6)   6  Plantar fascial fibromatosis (728 71) (M72 2)   7  Plantar fasciitis (728 71) (M72 2)    Current Meds    1  Esomeprazole Magnesium 40 MG Oral Capsule Delayed Release; TAKE 1 CAPSULE     Daily; Therapy: [id number] to 452 [id number])  Requested for: 58CVD6498; Last     Rx:30Oct2017 Ordered   2  Simvastatin 40 MG Oral Tablet; TAKE 1 TABLET BY MOUTH DAILY AT BEDTIME; Therapy: 43UNS5449 to (Evaluate:77Hce5215)  Requested for: 50RTS0070; Last     Rx:30Oct2017 Ordered    Allergies   1  No Known Drug Allergies  2  No Known Environmental Allergies   3  No Known Food Allergies    Vitals   Signs   Heart Rate: 80  Systolic: 271  Diastolic: 82  Height: 5 ft 9 in  Weight: 236 lb   BMI Calculated: 34 85  BSA Calculated: 2 22    Physical Exam      Left Wrist: Appearance: Ganglion cyst over radial volar aspect of wrist  Palpatory findings include decreased sensation to light touch of the median nerve distribution,-- a 2 cm mass-- and-- round, firm and smooth mass  ROM: Full  Decreased  strength  Special Tests: positive Phalen's Test-- and-- positive Tinel's Sign over the carpal tunnel  positive Kayode's test      Musculoskeletal - Gait and station: Normal       Cardiovascular - Examination of extremities for edema and/or varicosities: Normal -- Heart - RRR, no murmurs, no rubs, no gallops        Respiratory - Lungs - Clear to auscultation bilaterally, no rales, no rhonci, no wheezes  Skin - Skin and subcutaneous tissue: Normal       Neurologic - Sensation: Abnormal  Sensory exam: light touch was not intact  Light Touch: (Decreased sensation to light touch over median nerve distribution)  -- Upper extremity peripheral neuro exam: Normal       Psychiatric - Orientation to person, place, and time: Normal -- Mood and affect: Normal       Eyes      Conjunctiva and lids: Normal        Attending Note   Collaborating Physician Note: Collaborating Physician: I interviewed and examined the patient-- and-- I agree with the Advanced Practitioner note  Surgery Scheduling Form   Surgery Schedule Form [facility] Standard:      Location: [facility]    Confirmation Number:    PROCEDURE DETAILS    Procedure Date:      Requested Time: AM      Surgeon: Dr Alia Martines:    DEBBIE MENDEZ [address] Required:      Procedure: Left carpal tunnel release, resection left wrist volar ganglion cyst      Bed: Out Patient - No Bed Required      Laterality/Level: Left  Case Length:      Anticipated frozen section: NO  Anesthesia: general       Procedure Codes:      Pre-op diagnosis: Left carpal tunnel syndrome, left wrist volar ganglion cyst      Diagnosis Code(s): G56 02, B95 094    Equipment:      Equipment Needs: Will send tissue for pathology    Implants:       Is the patient able to walk up a flight of stairs, walk up a hill or do heavy housework WITHOUT having chest pain or shortness of breath?     REGISTRATION & FINANCIAL CLEARANCE    FA Initials:    Insurance:    Policy Number: Group Number:       PRE-ADMISSION TESTING/CLINICAL INFORMATION      PAT Location: [facility]         CONSULTS NEEDED:      Anesthesia Consult: No Anesthesia consult needed      Medical Consult: No Medical consult needed      Cardiac Consult: No Cardiac consult needed      Other Consult: No Other consult needed     ALLERGIES AND ALERTS         Latex Allergy: NO Penicillin Allergy: NO      Malignant Hyperthermia: NO      Diabetic Patient: NO       ERAS Patient:    COMMENTS    Scheduling Information Provided By:       CASE MANAGEMENT:      Future Appointments      Date/Time Provider Specialty Site   03/15/2018 08:00 AM Anna Denise DO [facility]   01/24/2018 10:30 AM Taryn Ross MD Orthopedic Surgery [address]   01/31/2018 02:45 PM Taryn Ross MD Orthopedic Surgery [facility]     Signatures    Electronically signed by : Dhruv Jane, [facility]; Jan 5 2018  4:01PM EST                       (Author)     Electronically signed by : Edinson Pruitt MD; Jan 5 2018  4:03PM EST                       (Author)

## 2018-01-31 ENCOUNTER — OFFICE VISIT (OUTPATIENT)
Dept: OBGYN CLINIC | Facility: CLINIC | Age: 68
End: 2018-01-31

## 2018-01-31 VITALS
SYSTOLIC BLOOD PRESSURE: 141 MMHG | DIASTOLIC BLOOD PRESSURE: 87 MMHG | HEART RATE: 90 BPM | BODY MASS INDEX: 36.64 KG/M2 | HEIGHT: 69 IN | WEIGHT: 247.4 LBS

## 2018-01-31 DIAGNOSIS — G56.02 CARPAL TUNNEL SYNDROME OF LEFT WRIST: Primary | ICD-10-CM

## 2018-01-31 DIAGNOSIS — M67.432 GANGLION OF LEFT WRIST: ICD-10-CM

## 2018-01-31 PROCEDURE — 99024 POSTOP FOLLOW-UP VISIT: CPT | Performed by: ORTHOPAEDIC SURGERY

## 2018-01-31 NOTE — PATIENT INSTRUCTIONS
Gentle range of motion of the wrist   Splint for comfort only  Advance activities slowly as tolerated  May start taking short showers getting the incisions wet  No soaking for one week

## 2018-01-31 NOTE — ASSESSMENT & PLAN NOTE
Postop left carpal tunnel release  Doing well  He was encouraged on range of motion and gentle strengthening  He will follow up in 3-4 weeks for recheck

## 2018-01-31 NOTE — PROGRESS NOTES
Assessment:     1  Carpal tunnel syndrome of left wrist    2  Ganglion of left wrist        Plan:     Problem List Items Addressed This Visit        Nervous and Auditory    Carpal tunnel syndrome of left wrist - Primary     Postop left carpal tunnel release  Doing well  He was encouraged on range of motion and gentle strengthening  He will follow up in 3-4 weeks for recheck  Relevant Orders    Cock Up Wrist Splint       Musculoskeletal and Integument    Ganglion of left wrist     Status post left volar wrist ganglion excision  Doing well  Stitches removed  Counseled on gentle range of motion  Splint as needed  Follow up in 3-4 weeks as a recheck  Relevant Orders    Cock Up Wrist Splint         Subjective:     Patient ID: Rafy Sanchez is a 78 y o  male  Chief Complaint:  61-year-old male status post left carpal tunnel release and volar wrist ganglion excision on January 24, 2018  He states he is doing well  He has no complaints  He is taking ibuprofen only for pain        Allergy:  No Known Allergies  Medications:  all current active meds have been reviewed  Past Medical History:  Past Medical History:   Diagnosis Date    AC joint arthropathy     BPH (benign prostatic hyperplasia)     Carpal tunnel syndrome     GERD without esophagitis     Hyperlipidemia     Peyronie's disease      Past Surgical History:  Past Surgical History:   Procedure Laterality Date    COLONOSCOPY      COLONOSCOPY      CYSTOSTOMY      urethral stricture    HERNIA REPAIR      MA ANKLE SCOPE,PLANTAR FASCIOTOMY Left 11/18/2016    Procedure: PLANTAR FASCIOTOMY;  Surgeon: Edilia Cui DPM;  [facility];  Service: Podiatry    MA EXCIS PRIMARY GANGLION WRIST Left 1/24/2018    Procedure: WRIST RESECTION VOLAR GANGLION CYST;  Surgeon: Maria Dolores Haq MD;  [facility];  Service: Orthopedics    MA REVISE MEDIAN N/CARPAL TUNNEL SURG Right 8/24/2016    Procedure: CARPAL TUNNEL RELEASE ;  Surgeon: Livia See Mimi Munoz MD;  [facility];  Service: Orthopedics    [place] REVISE MEDIAN N/CARPAL TUNNEL SURG Left 1/24/2018    Procedure: RELEASE CARPAL TUNNEL;  Surgeon: Guille Serrano MD;  [facility];  Service: Orthopedics     Family History:  Family History   Problem Relation Age of Onset    Cancer Father      Social History:  History   Alcohol Use    Yes     Comment: social     History   Drug Use No     History   Smoking Status    Former Smoker    Quit date: 2014   Smokeless Tobacco    Never Used     Review of Systems      Objective:  BP Readings from Last 1 Encounters:   01/31/18 141/87      Wt Readings from Last 1 Encounters:   01/31/18 112 kg (247 lb 6 4 oz)      BMI:   Estimated body mass index is 36 53 kg/m² as calculated from the following:    Height as of this encounter: 5' 9" (1 753 m)  Weight as of this encounter: 112 kg (247 lb 6 4 oz)  BSA:   Estimated body surface area is 2 26 meters squared as calculated from the following:    Height as of this encounter: 5' 9" (1 753 m)  Weight as of this encounter: 112 kg (247 lb 6 4 oz)  Physical Exam  Left Hand Exam     Comments:  Patient's carpal tunnel and ganglion excisions are healing very well  No drainage or erythema  Moving fingers well  Very minimal swelling    Sensation intact to light touch throughout

## 2018-01-31 NOTE — ASSESSMENT & PLAN NOTE
Status post left volar wrist ganglion excision  Doing well  Stitches removed  Counseled on gentle range of motion  Splint as needed  Follow up in 3-4 weeks as a recheck

## 2018-02-05 ENCOUNTER — TELEPHONE (OUTPATIENT)
Dept: FAMILY MEDICINE CLINIC | Facility: CLINIC | Age: 68
End: 2018-02-05

## 2018-02-05 DIAGNOSIS — K21.9 GASTROESOPHAGEAL REFLUX DISEASE WITHOUT ESOPHAGITIS: Primary | ICD-10-CM

## 2018-02-05 RX ORDER — PANTOPRAZOLE SODIUM 40 MG/1
40 TABLET, DELAYED RELEASE ORAL DAILY
Qty: 90 TABLET | Refills: 1 | Status: SHIPPED | OUTPATIENT
Start: 2018-02-05 | End: 2018-08-22 | Stop reason: SDUPTHER

## 2018-02-05 NOTE — TELEPHONE ENCOUNTER
Ed called:  He needs his Esomeprazole changed  to another med  The choices are Omeprazole, Pantoprazole, Famotidine or Ranitidine  Pleas send to [facility]

## 2018-02-21 ENCOUNTER — APPOINTMENT (OUTPATIENT)
Dept: RADIOLOGY | Facility: CLINIC | Age: 68
End: 2018-02-21
Payer: MEDICARE

## 2018-02-21 ENCOUNTER — OFFICE VISIT (OUTPATIENT)
Dept: OBGYN CLINIC | Facility: CLINIC | Age: 68
End: 2018-02-21
Payer: MEDICARE

## 2018-02-21 VITALS
HEIGHT: 69 IN | SYSTOLIC BLOOD PRESSURE: 130 MMHG | DIASTOLIC BLOOD PRESSURE: 74 MMHG | WEIGHT: 245 LBS | BODY MASS INDEX: 36.29 KG/M2 | HEART RATE: 89 BPM

## 2018-02-21 DIAGNOSIS — M67.432 GANGLION OF LEFT WRIST: ICD-10-CM

## 2018-02-21 DIAGNOSIS — M75.82 TENDINITIS OF LEFT ROTATOR CUFF: Primary | ICD-10-CM

## 2018-02-21 DIAGNOSIS — G56.02 CARPAL TUNNEL SYNDROME OF LEFT WRIST: ICD-10-CM

## 2018-02-21 DIAGNOSIS — M75.22 BICEPS TENDONITIS ON LEFT: ICD-10-CM

## 2018-02-21 PROCEDURE — 20610 DRAIN/INJ JOINT/BURSA W/O US: CPT | Performed by: ORTHOPAEDIC SURGERY

## 2018-02-21 PROCEDURE — 73030 X-RAY EXAM OF SHOULDER: CPT

## 2018-02-21 PROCEDURE — 99213 OFFICE O/P EST LOW 20 MIN: CPT | Performed by: ORTHOPAEDIC SURGERY

## 2018-02-21 RX ORDER — BETAMETHASONE SODIUM PHOSPHATE AND BETAMETHASONE ACETATE 3; 3 MG/ML; MG/ML
6 INJECTION, SUSPENSION INTRA-ARTICULAR; INTRALESIONAL; INTRAMUSCULAR; SOFT TISSUE
Status: COMPLETED | OUTPATIENT
Start: 2018-02-21 | End: 2018-02-21

## 2018-02-21 RX ORDER — LIDOCAINE HYDROCHLORIDE 10 MG/ML
8 INJECTION, SOLUTION INFILTRATION; PERINEURAL
Status: COMPLETED | OUTPATIENT
Start: 2018-02-21 | End: 2018-02-21

## 2018-02-21 RX ADMIN — LIDOCAINE HYDROCHLORIDE 8 ML: 10 INJECTION, SOLUTION INFILTRATION; PERINEURAL at 15:41

## 2018-02-21 RX ADMIN — BETAMETHASONE SODIUM PHOSPHATE AND BETAMETHASONE ACETATE 6 MG: 3; 3 INJECTION, SUSPENSION INTRA-ARTICULAR; INTRALESIONAL; INTRAMUSCULAR; SOFT TISSUE at 15:41

## 2018-02-21 NOTE — ASSESSMENT & PLAN NOTE
Some biceps tendinitis of the left shoulder with possible small superior labral tear  I recommended physical therapy    He will follow up with me in 6-8 weeks if he fails to have significant improvement

## 2018-02-21 NOTE — ASSESSMENT & PLAN NOTE
Tendinitis of left supraspinatus and subscap  Diagnosis was discussed with the patient  Recommended physical therapy  Also offered the patient a cortisone injection in the subacromial space  Please refer to the procedure note  He will follow up with me in 6-8 weeks if he is failing to have improvement

## 2018-02-21 NOTE — ASSESSMENT & PLAN NOTE
Status post left carpal tunnel release  Overall doing very well  Does have some pillar pain  He was counseled on deep soft tissue massage, stretching and strengthening  Continue to work on this  Follow up if it continues to bother him long term

## 2018-02-21 NOTE — PROGRESS NOTES
Assessment:     1  Tendinitis of left rotator cuff    2  Ganglion of left wrist        Plan:     Problem List Items Addressed This Visit        Nervous and Auditory    Carpal tunnel syndrome of left wrist     Status post left carpal tunnel release  Overall doing very well  Does have some pillar pain  He was counseled on deep soft tissue massage, stretching and strengthening  Continue to work on this  Follow up if it continues to bother him long term  Musculoskeletal and Integument    Tendinitis of left rotator cuff - Primary     Tendinitis of left supraspinatus and subscap  Diagnosis was discussed with the patient  Recommended physical therapy  Also offered the patient a cortisone injection in the subacromial space  Please refer to the procedure note  He will follow up with me in 6-8 weeks if he is failing to have improvement  Relevant Orders    XR shoulder 2+ vw left    Ambulatory referral to Physical Therapy    Large joint arthrocentesis    Biceps tendonitis on left     Some biceps tendinitis of the left shoulder with possible small superior labral tear  I recommended physical therapy  He will follow up with me in 6-8 weeks if he fails to have significant improvement         Relevant Orders    Ambulatory referral to Physical Therapy    RESOLVED: Ganglion of left wrist    Relevant Orders    Ambulatory referral to Physical Therapy         Subjective:     Patient ID: He Slater is a 78 y o  male  Chief Complaint:  28-year-old male here today to follow-up on his left wrist as well as to be evaluated for his left shoulder  He complains of some soreness in the palm following his carpal tunnel release  He does not feel that he has any further issues from the ganglion wrist excision that was performed  He denies any numbness or tingling  He has been putting vitamin-E oil on the incision  In regards to the left shoulder has tenderness anterior medially on the proximal humerus  Overhead activity her abduction is what is most painful to him  He has not had any injections or done any physical therapy for this in the past   In slowly worsening  He has discomfort at night  He is wanting to have this addressed as soon as possible see what he can do about getting better  Allergy:  No Known Allergies  Medications:  all current active meds have been reviewed  Past Medical History:  Past Medical History:   Diagnosis Date    AC joint arthropathy     BPH (benign prostatic hyperplasia)     Carpal tunnel syndrome     GERD without esophagitis     Hyperlipidemia     Peyronie's disease     Stomach disorder      Past Surgical History:  Past Surgical History:   Procedure Laterality Date    COLONOSCOPY      COLONOSCOPY      CYSTOSTOMY      urethral stricture    HERNIA REPAIR      VA ANKLE SCOPE,PLANTAR FASCIOTOMY Left 11/18/2016    Procedure: PLANTAR FASCIOTOMY;  Surgeon: Arabella Poole DPM;  [facility];  Service: Podiatry    VA EXCIS PRIMARY GANGLION WRIST Left 1/24/2018    Procedure: WRIST RESECTION VOLAR GANGLION CYST;  Surgeon: Anny Rebolledo MD;  [facility];  Service: Orthopedics    VA REVISE MEDIAN N/CARPAL TUNNEL SURG Right 8/24/2016    Procedure: CARPAL TUNNEL RELEASE ;  Surgeon: Anny Rebolledo MD;  [facility];  Service: Orthopedics    VA REVISE MEDIAN N/CARPAL TUNNEL SURG Left 1/24/2018    Procedure: RELEASE CARPAL TUNNEL;  Surgeon: Anny Rebolledo MD;  [facility];  Service: Orthopedics     Family History:  Family History   Problem Relation Age of Onset    Cancer Father      Social History:  History   Alcohol Use    Yes     Comment: social     History   Drug Use No     History   Smoking Status    Former Smoker    Quit date: 2014   Smokeless Tobacco    Never Used     Review of Systems   Constitutional: Negative  HENT: Negative  Eyes: Negative  Respiratory: Negative  Cardiovascular: Negative  Gastrointestinal: Negative  Endocrine: Negative  Genitourinary: Negative  Musculoskeletal: Positive for arthralgias  Skin: Negative  Allergic/Immunologic: Negative  Neurological: Negative  Hematological: Negative  Psychiatric/Behavioral: Negative  Objective:  BP Readings from Last 1 Encounters:   02/21/18 130/74      Wt Readings from Last 1 Encounters:   02/21/18 111 kg (245 lb)      BMI:   Estimated body mass index is 36 18 kg/m² as calculated from the following:    Height as of this encounter: 5' 9" (1 753 m)  Weight as of this encounter: 111 kg (245 lb)  BSA:   Estimated body surface area is 2 25 meters squared as calculated from the following:    Height as of this encounter: 5' 9" (1 753 m)  Weight as of this encounter: 111 kg (245 lb)  Physical Exam    Left Hand Exam     Comments:  Left wrist has incisions that are healing very well, he has some dense tender scar tissue at the carpal tunnel  Wrist extension is 20° actively and 30° passively  Near full passive wrist flexion  Left Shoulder Exam     Tenderness   Left shoulder tenderness location: Tender to palpation over the greater tuberosity anteriorly as well as the biceps tendon  Range of Motion   The patient has normal left shoulder ROM  Muscle Strength   The patient has normal left shoulder strength (Pain with resisted internal rotation and abduction)  Tests   Cross Arm: negative  Drop Arm: negative  Hawkin's test: negative  Impingement: positive  Sulcus: absent    Other   Erythema: absent  Sensation: normal  Pulse: present     Comments:  Positive speed's, positive [place]'s, no crepitus on passive range of motion            I have personally reviewed pertinent films in PACS      Large joint arthrocentesis  Date/Time: 2/21/2018 3:41 PM  Consent given by: patient  Timeout: Immediately prior to procedure a time out was called to verify the correct patient, procedure, equipment, support staff and site/side marked as required Supporting Documentation  Indications: pain   Procedure Details  Location: shoulder - L subacromial bursa  Preparation: Patient was prepped and draped in the usual sterile fashion  Needle size: 22 G  Ultrasound guidance: no  Approach: posterior  Medications administered: 8 mL lidocaine 1 %; 6 mg betamethasone acetate-betamethasone sodium phosphate 6 (3-3) mg/mL    Patient tolerance: patient tolerated the procedure well with no immediate complications  Dressing:  Sterile dressing applied

## 2018-03-02 ENCOUNTER — EVALUATION (OUTPATIENT)
Dept: PHYSICAL THERAPY | Facility: CLINIC | Age: 68
End: 2018-03-02
Payer: MEDICARE

## 2018-03-02 DIAGNOSIS — M67.432 GANGLION OF LEFT WRIST: ICD-10-CM

## 2018-03-02 DIAGNOSIS — M75.82 TENDINITIS OF LEFT ROTATOR CUFF: ICD-10-CM

## 2018-03-02 DIAGNOSIS — M75.82 ROTATOR CUFF TENDONITIS, LEFT: Primary | ICD-10-CM

## 2018-03-02 DIAGNOSIS — M75.22 BICEPS TENDONITIS ON LEFT: ICD-10-CM

## 2018-03-02 NOTE — PROGRESS NOTES
PT Evaluation     Today's date: 3/2/2018  Patient name: Eleonora Richmond  : 1950  MRN: [id number]  Referring provider: Carlotta Sousa MD  Dx:   Encounter Diagnosis     ICD-10-CM    1  Rotator cuff tendonitis, left M75 82    2  Biceps tendonitis on left M75 22                   Assessment  Impairments: abnormal muscle firing, abnormal muscle tone, abnormal or restricted ROM, abnormal movement, activity intolerance, impaired physical strength, lacks appropriate home exercise program, pain with function and weight-bearing intolerance    Assessment details: Pt is a 78 y o  male that presents with decreased strength, increased pain, decreased rom, functional limitations, and symptoms of left shoulder tendonitis and s/p left carpal tunnel release  Pt would benefit from skilled PT to return him to a more functional condition  Understanding of Dx/Px/POC: good   Prognosis: good    Goals  1  Pt will have 0/10 pain at rest in 4 weeks  2  Pt will have <2/10 pain with activity in 6-8 weeks  3  Pt will have full arom of the left UE in 6 weeks  4  Pt will have full prom of the left UE in 3 weeks  5  Pt will be able to sleep on his side with out pain in 4 weeks  6  Pt will be able to lift 5# overhead with out pain in 6 weeks    Plan  Patient would benefit from: skilled PT  Referral necessary: No  Planned modality interventions: TENS, thermotherapy: hydrocollator packs and cryotherapy  Planned therapy interventions: neuromuscular re-education, manual therapy, therapeutic activities, therapeutic exercise and home exercise program  Frequency: 2x week  Duration in weeks: 8  Treatment plan discussed with: patient  Plan details: Poc ends 18        Subjective Evaluation    History of Present Illness  Mechanism of injury: Pt notes that he had CTR on his left wrist as well as a ganglion cyst removed from the palm side of the the wrist  He notes that he still has pain in the wrist, and the thumb   He notes that he also has developed left shoulder pain since the surgery  He notes that it has been progressively getting worse  He tried the exercises that he was doing on his right shoulder last year, but no improvement  He notes that he can't sleep on that side, and has difficulty lifting his arm overhead  Quality of life: good    Pain  Current pain ratin  At best pain ratin  At worst pain ratin  Location: shoulder, hand both left  Quality: dull ache, sharp and throbbing  Aggravating factors: lifting and overhead activity  Progression: worsening (slight improvement for wrist, worse for shoulder)    Patient Goals  Patient goals for therapy: decreased pain, increased motion, return to sport/leisure activities, independence with ADLs/IADLs and increased strength          Objective     Neurological Testing     Reflexes   Left   Biceps (C5/C6): normal (2+)  Brachioradialis (C6): normal (2+)  Triceps (C7): normal (2+)    Right   Biceps (C5/C6): normal (2+)  Brachioradialis (C6): normal (2+)  Triceps (C7): normal (2+)    Active Range of Motion     Left Wrist   Wrist flexion: 55 degrees with pain  Wrist extension: 65 degrees with pain  Radial deviation: WFL  Ulnar deviation: WFL      Left Thumb   Opposition: Thumb rom wnl but painful with weber abduction and flexion    Joint Play   Left Shoulder  Hypermobile in the anterior capsule, posterior capsule and inferior capsule       Strength/Myotome Testing     Left Shoulder     Planes of Motion   Flexion: 3+   Extension: 4   Abduction: 3+   Adduction: 4   External rotation at 0°: 3+   Internal rotation at 0°: 4     Right Shoulder     Planes of Motion   Flexion: 4+   Extension: 4+   Abduction: 4+   Adduction: 4+   External rotation at 0°: 4   Internal rotation at 0°: 4+     Left Wrist/Hand      (2nd hand position)     Trial 1: 55    Thumb Strength  Key/Lateral Pinch     Trail 1: 15    Right Wrist/Hand      (2nd hand position)     Trial 1: 85    Thumb Strength   Key/Lateral Pinch     Trial 1: 19    Tests     Left Shoulder   Positive Hawkin's, Neer's and passive horizontal adduction  Negative empty can and painful arc             Precautions: recent left Carpal tunnel release and ganglion cyst excision    Daily Treatment Diary       Manuals 3/4/18            Shoulder mobs grade 3 post and inf mobs nv            Shoulder prom all planes nv            Left shoudler infraspinatus and subscap tpr nv            Left wrist scar mobilization nv            Exercise Diary              UBE nv            Shoulder rows and ext Blue nv            Shoulder Er  Green nv            Shoulder IR Dbl green nv            Wrist 4 way Green nv            Pro sup (stick) 1# nv            gripper 50# nv                                                                                                                                                                                                               Modalities

## 2018-03-04 PROCEDURE — G8990 OTHER PT/OT CURRENT STATUS: HCPCS | Performed by: PHYSICAL THERAPIST

## 2018-03-04 PROCEDURE — 97161 PT EVAL LOW COMPLEX 20 MIN: CPT | Performed by: PHYSICAL THERAPIST

## 2018-03-04 PROCEDURE — G8991 OTHER PT/OT GOAL STATUS: HCPCS | Performed by: PHYSICAL THERAPIST

## 2018-03-06 ENCOUNTER — OFFICE VISIT (OUTPATIENT)
Dept: PHYSICAL THERAPY | Facility: CLINIC | Age: 68
End: 2018-03-06
Payer: MEDICARE

## 2018-03-06 DIAGNOSIS — M67.432 GANGLION OF LEFT WRIST: ICD-10-CM

## 2018-03-06 DIAGNOSIS — Z12.5 SCREENING PSA (PROSTATE SPECIFIC ANTIGEN): ICD-10-CM

## 2018-03-06 DIAGNOSIS — M75.82 ROTATOR CUFF TENDONITIS, LEFT: Primary | ICD-10-CM

## 2018-03-06 DIAGNOSIS — E78.5 DYSLIPIDEMIA: Primary | ICD-10-CM

## 2018-03-06 DIAGNOSIS — M75.82 TENDINITIS OF LEFT ROTATOR CUFF: ICD-10-CM

## 2018-03-06 DIAGNOSIS — Z13.6 SCREENING FOR CARDIOVASCULAR CONDITION: ICD-10-CM

## 2018-03-06 DIAGNOSIS — K21.9 GASTROESOPHAGEAL REFLUX DISEASE, ESOPHAGITIS PRESENCE NOT SPECIFIED: ICD-10-CM

## 2018-03-06 DIAGNOSIS — M75.22 BICEPS TENDONITIS ON LEFT: ICD-10-CM

## 2018-03-06 PROCEDURE — 97110 THERAPEUTIC EXERCISES: CPT

## 2018-03-06 PROCEDURE — 97140 MANUAL THERAPY 1/> REGIONS: CPT

## 2018-03-06 NOTE — PROGRESS NOTES
Daily Note     Today's date: 3/6/2018  Patient name: Eleonora Richmond  : 1950  MRN: [id number]  Referring provider: Carlotta Sousa MD  Dx:   Encounter Diagnosis     ICD-10-CM    1  Rotator cuff tendonitis, left M75 82    2  Biceps tendonitis on left M75 22    3  Tendinitis of left rotator cuff M75 82    4  Ganglion of left wrist M67 [id number]                   Subjective: pt notes that he is not feeling much with isometrics      Objective: See treatment diary below      Manuals 3/4/18 3/6           Shoulder mobs grade 3 post and inf mobs nv nv           Shoulder prom all planes nv Not kendra           Left shldr pec infraspinatus and subscap tpr nv 8           Left wrist scar mobilization nv 6           Exercise Diary              UBE nv nv           Shoulder rows and ext Blue nv Green  2x10           Shoulder Er  Green nv grn  2x10           Shoulder IR Dbl green nv Dbl grn  2x10           Wrist 4 way Green nv Green  2x10 all           Pro sup (stick) 1# nv 0#  2x10           gripper 50# nv 50#  2x10                                                                                                                                                                                                              Modalities                                         Assessment: pt was not able to tolerate PROM on left shoulder as he had pain and was unable to relax enough to get any stretch, all pain noted   Pt with no pain for bands except rows      Plan: Continue per plan of care  pt is to perform isometrics for flexion and abduction but able to perform therband as long as sxs stay low

## 2018-03-08 ENCOUNTER — OFFICE VISIT (OUTPATIENT)
Dept: PHYSICAL THERAPY | Facility: CLINIC | Age: 68
End: 2018-03-08
Payer: MEDICARE

## 2018-03-08 DIAGNOSIS — M75.22 BICEPS TENDONITIS ON LEFT: ICD-10-CM

## 2018-03-08 DIAGNOSIS — M67.432 GANGLION OF LEFT WRIST: ICD-10-CM

## 2018-03-08 DIAGNOSIS — G56.02 CARPAL TUNNEL SYNDROME OF LEFT WRIST: ICD-10-CM

## 2018-03-08 DIAGNOSIS — M75.82 ROTATOR CUFF TENDONITIS, LEFT: Primary | ICD-10-CM

## 2018-03-08 DIAGNOSIS — M75.82 TENDINITIS OF LEFT ROTATOR CUFF: ICD-10-CM

## 2018-03-08 PROCEDURE — 97140 MANUAL THERAPY 1/> REGIONS: CPT

## 2018-03-08 PROCEDURE — 97014 ELECTRIC STIMULATION THERAPY: CPT

## 2018-03-08 NOTE — PROGRESS NOTES
2Daily Note     Today's date: 3/8/2018  Patient name: Andre Argueta  : 1950  MRN: [id number]  Referring provider: Alex Marrero MD  Dx:   Encounter Diagnosis     ICD-10-CM    1  Rotator cuff tendonitis, left M75 82    2  Biceps tendonitis on left M75 22    3  Tendinitis of left rotator cuff M75 82    4  Ganglion of left wrist [id number]                   Subjective: pt notes that his shoulder has been very painful since after last visit but that he did not have pain while he was performing exercises in clinic but noted later on  Objective: See treatment diary below    Manuals 3/4/18 3/6 3          Shoulder mobs grade 3 post and inf mobs nv nv           Shoulder prom all planes nv Not kendra           Left shldr pec infraspinatus and subscap tpr nv 8 8          Left wrist scar mobilization nv 6 8          Exercise Diary              UBE nv nv           Shoulder rows and ext Blue nv Green  2x10           Shoulder Er  Green nv grn  2x10           Shoulder IR Dbl green nv Dbl grn  2x10           Wrist 4 way Green nv Green  2x10 all           Pro sup (stick) 1# nv 0#  2x10           gripper 50# nv 50#  2x10                                                                                                                                                                                                              Modalities  3/8           CP with TENS shouder  15'  seated                          Assessment:   Patient with difficulty raising arm up to shoulder level, TTP over anterior shoulder but no more than last visit but was more tender into bicep region today  Before last visit he did swim then came right to PT and notes that swimming does bother shoulder for about a lap then feels better, possible over doing with both swimming and initation of PT in same day    Pt asked to hold off of both HEP and swimming for next few days and use CP and TENs at home and can trial Mh on shoulder, if sxs increase or do not get better he is to call Dr Irving Saunders office to let them know what is going on   Pt verbalizes understanding of all discussed in session      Plan: will assess sxs nv and trial of adding exercises back in if [patient is able to tolerate

## 2018-03-13 ENCOUNTER — OFFICE VISIT (OUTPATIENT)
Dept: PHYSICAL THERAPY | Facility: CLINIC | Age: 68
End: 2018-03-13
Payer: MEDICARE

## 2018-03-13 DIAGNOSIS — M75.82 ROTATOR CUFF TENDONITIS, LEFT: Primary | ICD-10-CM

## 2018-03-13 DIAGNOSIS — G56.02 CARPAL TUNNEL SYNDROME OF LEFT WRIST: ICD-10-CM

## 2018-03-13 DIAGNOSIS — M75.22 BICEPS TENDONITIS ON LEFT: ICD-10-CM

## 2018-03-13 DIAGNOSIS — M67.432 GANGLION OF LEFT WRIST: ICD-10-CM

## 2018-03-13 DIAGNOSIS — M75.82 TENDINITIS OF LEFT ROTATOR CUFF: ICD-10-CM

## 2018-03-13 PROCEDURE — 97140 MANUAL THERAPY 1/> REGIONS: CPT

## 2018-03-13 PROCEDURE — 97014 ELECTRIC STIMULATION THERAPY: CPT

## 2018-03-13 PROCEDURE — 97110 THERAPEUTIC EXERCISES: CPT

## 2018-03-13 NOTE — PROGRESS NOTES
Daily Note     Today's date: 3/13/2018  Patient name: Yenni Stewart  : 1950  MRN: [id number]  Referring provider: Maxine Mcclain MD  Dx:   Encounter Diagnosis     ICD-10-CM    1  Rotator cuff tendonitis, left M75 82    2  Biceps tendonitis on left M75 22    3  Tendinitis of left rotator cuff M75 82    4  Ganglion of left wrist M67 432    5  Carpal tunnel syndrome of left wrist G56 02                   Subjective: notes that he has more movement in shoulder than last visit but has not done any exercises or swimming as instructed  Objective: See treatment diary below  Manuals 3/4/18 3/6 3/8 3/13         Shoulder mobs grade 3 post and inf mobs nv nv  np         Shoulder prom all planes nv Not kendra  np         Left shldr pec infraspinatus and subscap tpr nv 8 8 6'         Left wrist scar mobilization nv 6 8 8'         Exercise Diary     3/13         UBE nv nv           Shoulder rows and ext Blue nv Green  2x10  np         Shoulder Er  Green nv grn  2x10  np         Shoulder IR Dbl green nv Dbl grn  2x10  np         Wrist 4 way Green nv Green  2x10 all  np         Pro sup (stick) 1# nv 0#  2x10  0#  2x10         gripper 50# nv 50#  2x10  50#  2x10         finkelstein stretch    15"x3         Shoulder iso    Abd, extx10                                                                                                                                                                                  Modalities  3/8 3/13            CP with TENS shouder  15'  seated Post treatment                           Assessment:   Patient with less TTP over pec region and bicep region of Left shoulder  Only able to tolerate iso for abd and ext with no increased pain, others caused increase in pain therefore held for today  Plan: Continue per plan of care  Progress treatment as tolerated

## 2018-03-15 ENCOUNTER — OFFICE VISIT (OUTPATIENT)
Dept: PHYSICAL THERAPY | Facility: CLINIC | Age: 68
End: 2018-03-15
Payer: MEDICARE

## 2018-03-15 ENCOUNTER — OFFICE VISIT (OUTPATIENT)
Dept: FAMILY MEDICINE CLINIC | Facility: CLINIC | Age: 68
End: 2018-03-15
Payer: MEDICARE

## 2018-03-15 VITALS
TEMPERATURE: 100.3 F | DIASTOLIC BLOOD PRESSURE: 70 MMHG | BODY MASS INDEX: 35.55 KG/M2 | HEIGHT: 69 IN | SYSTOLIC BLOOD PRESSURE: 130 MMHG | WEIGHT: 240 LBS

## 2018-03-15 DIAGNOSIS — M75.82 ROTATOR CUFF TENDONITIS, LEFT: Primary | ICD-10-CM

## 2018-03-15 DIAGNOSIS — F17.200 SMOKING: ICD-10-CM

## 2018-03-15 DIAGNOSIS — G56.02 CARPAL TUNNEL SYNDROME OF LEFT WRIST: ICD-10-CM

## 2018-03-15 DIAGNOSIS — Z00.00 MEDICARE ANNUAL WELLNESS VISIT, SUBSEQUENT: Primary | ICD-10-CM

## 2018-03-15 DIAGNOSIS — M75.22 BICEPS TENDONITIS ON LEFT: ICD-10-CM

## 2018-03-15 DIAGNOSIS — M75.82 TENDINITIS OF LEFT ROTATOR CUFF: ICD-10-CM

## 2018-03-15 DIAGNOSIS — J18.9 PNEUMONIA DUE TO INFECTIOUS ORGANISM, UNSPECIFIED LATERALITY, UNSPECIFIED PART OF LUNG: ICD-10-CM

## 2018-03-15 DIAGNOSIS — M67.432 GANGLION OF LEFT WRIST: ICD-10-CM

## 2018-03-15 PROCEDURE — G0439 PPPS, SUBSEQ VISIT: HCPCS | Performed by: FAMILY MEDICINE

## 2018-03-15 PROCEDURE — 97110 THERAPEUTIC EXERCISES: CPT | Performed by: PHYSICAL THERAPIST

## 2018-03-15 PROCEDURE — 99213 OFFICE O/P EST LOW 20 MIN: CPT | Performed by: FAMILY MEDICINE

## 2018-03-15 PROCEDURE — 97112 NEUROMUSCULAR REEDUCATION: CPT | Performed by: PHYSICAL THERAPIST

## 2018-03-15 PROCEDURE — 97140 MANUAL THERAPY 1/> REGIONS: CPT | Performed by: PHYSICAL THERAPIST

## 2018-03-15 RX ORDER — AZITHROMYCIN 250 MG/1
TABLET, FILM COATED ORAL
Qty: 6 TABLET | Refills: 0 | Status: SHIPPED | OUTPATIENT
Start: 2018-03-15 | End: 2018-03-21

## 2018-03-15 NOTE — PROGRESS NOTES
Assessment/Plan:    Pneumonia due to infectious organism, unspecified laterality, unspecified part of lung  -     azithromycin (ZITHROMAX) 250 mg tablet; 2 tabs on day 1 and 1 tab for next 4 days  -     HYDROcodone-homatropine (HYCODAN) 5-1 5 mg/5 mL syrup; Take 5 mL by mouth 2 (two) times a day as needed for cough Max Daily Amount: 10 mL            Subjective:     Chief Complaint   Patient presents with        Cough     Patient also has a fever with cough for approx one week  Patient ID: Keya Irving is a 80 y o  male  Started to feel as if he had acold 4-5 days ago  Cough is getting worse  Now has a fever  +congestion        Cough   This is a new problem  The current episode started in the past 7 days  The problem has been gradually worsening  The problem occurs constantly  The cough is productive of sputum  Associated symptoms include chills, a fever and nasal congestion  Nothing aggravates the symptoms  Risk factors for lung disease include smoking/tobacco exposure  He has tried nothing for the symptoms  The following portions of the patient's history were reviewed and updated as appropriate: allergies, current medications, past family history, past medical history, past social history, past surgical history and problem list     Review of Systems   Constitutional: Positive for chills and fever  HENT: Negative  Eyes: Negative  Respiratory: Positive for cough  Cardiovascular: Negative  Gastrointestinal: Negative  Endocrine: Negative  Genitourinary: Negative  Musculoskeletal: Negative  Skin: Negative  Allergic/Immunologic: Negative  Neurological: Negative  Hematological: Negative  Psychiatric/Behavioral: Negative  All other systems reviewed and are negative          Objective:    Vitals:    03/15/18 0750   BP: 130/70   BP Location: Right arm   Patient Position: Sitting   Temp: 100 3 °F (37 9 °C)   TempSrc: Tympanic   Weight: 109 kg (240 lb)   Height: 5' 9" (1 753 m)          Physical Exam   Constitutional: He is oriented to person, place, and time  He appears well-developed and well-nourished  No distress  HENT:   Head: Normocephalic  Right Ear: External ear normal    Left Ear: External ear normal    Nose: Nose normal    Mouth/Throat: Oropharynx is clear and moist    Eyes: Conjunctivae and EOM are normal  Pupils are equal, round, and reactive to light  Right eye exhibits no discharge  Left eye exhibits no discharge  Neck: Normal range of motion  Cardiovascular: Normal rate, regular rhythm and normal heart sounds  Pulmonary/Chest: Effort normal and breath sounds normal    Abdominal: Soft  Bowel sounds are normal  He exhibits no distension  There is no tenderness  Musculoskeletal: Normal range of motion  Neurological: He is alert and oriented to person, place, and time  No cranial nerve deficit  Skin: Skin is warm and dry  No rash noted  Psychiatric: He has a normal mood and affect  His behavior is normal  Judgment and thought content normal    Nursing note and vitals reviewed

## 2018-03-15 NOTE — PROGRESS NOTES
Daily Note     Today's date: 3/15/2018  Patient name: Raiza Velasquez  : 1950  MRN: [id number]  Referring provider: Jimmye Dakin, MD  Dx:   Encounter Diagnosis     ICD-10-CM    1  Rotator cuff tendonitis, left M75 82    2  Biceps tendonitis on left M75 22    3  Tendinitis of left rotator cuff M75 82    4  Ganglion of left wrist M67 432    5  Carpal tunnel syndrome of left wrist G56 02                   Subjective: Pt notes that he has been using the tens and has been having less resting pain with the shoulder but still notes a lot of swelling in the left hand  Objective: See treatment diary below    Objective: See treatment diary below  Manuals 3/4/18 3/6 3/8 3/13 3/15        Shoulder mobs grade 3 post and inf mobs nv nv  np 4'        Shoulder prom all planes nv Not kendra  np 5'        Left shldr pec infraspinatus and subscap tpr nv 8 8 6' 6'        Left wrist scar mobilization nv 6 8 8' 5'        Exercise Diary     3/13 3/15        UBE nv nv           Shoulder rows and ext Blue nv Green  2x10  np         Shoulder Er  Green nv grn  2x10  np         Shoulder IR Dbl green nv Dbl grn  2x10  np         Wrist 4 way Green nv Green  2x10 all  np         Pro sup (stick) 1# nv 0#  2x10  0#  2x10         gripper 50# nv 50#  2x10  50#  2x10         finkelstein stretch    15"x3         Shoulder iso    Abd, extx10         S/l ER     x10        S/l abd (25-90deg)     x10        Prone rows     x10        Prone ext     x10        Prone Habd     2x10                                                                                                                Modalities  3/8 3/13            CP with TENS shouder  15'  seated Post treatment                           Assessment: Pt presents with increased ttp in the left pectorals, left subscap, and thenar musculature  Pt with significant weakness in the post shoulder girdle signified by visible shaking of the scapula with prone activity    Plan: Continue per plan of care

## 2018-03-15 NOTE — PROGRESS NOTES
Assessment/Plan:     Diagnoses and all orders for this visit:    Medicare annual wellness visit, subsequent    Will need prevnar but too sick today  He will get the labs that were previously ordered  Otherwise up to date on health maint    Low dose ct scan ordered   For smoking history        Smoking  -     CT lung screening program; Future            Subjective:     Chief Complaint   Patient presents with    Medicare Wellness Visit    Cough     Patient also has a fever with cough for approx one week  Patient ID: Steve Kurtz is a 80 y o  male  Here for medicare awv  See other note for acute issues          The following portions of the patient's history were reviewed and updated as appropriate: allergies, current medications, past family history, past medical history, past social history, past surgical history and problem list     Review of Systems   Constitutional: Positive for chills and fever  HENT: Negative  Eyes: Negative  Respiratory: Positive for cough  Cardiovascular: Negative  Gastrointestinal: Negative  Endocrine: Negative  Genitourinary: Negative  Musculoskeletal: Negative  Skin: Negative  Allergic/Immunologic: Negative  Neurological: Negative  Hematological: Negative  Psychiatric/Behavioral: Negative  All other systems reviewed and are negative  Objective:    Vitals:    03/15/18 0750   BP: 130/70   BP Location: Right arm   Patient Position: Sitting   Temp: 100 3 °F (37 9 °C)   TempSrc: Tympanic   Weight: 109 kg (240 lb)   Height: 5' 9" (1 753 m)          Physical Exam   Constitutional: He is oriented to person, place, and time  He appears well-developed and well-nourished  No distress  HENT:   Head: Normocephalic  Right Ear: External ear normal    Left Ear: External ear normal    Nose: Nose normal    Mouth/Throat: Oropharynx is clear and moist    Eyes: Conjunctivae and EOM are normal  Pupils are equal, round, and reactive to light  Right eye exhibits no discharge  Left eye exhibits no discharge  Neck: Normal range of motion  Cardiovascular: Normal rate, regular rhythm and normal heart sounds  Pulmonary/Chest: Effort normal and breath sounds normal    Abdominal: Soft  Bowel sounds are normal  He exhibits no distension  There is no tenderness  Musculoskeletal: Normal range of motion  Neurological: He is alert and oriented to person, place, and time  No cranial nerve deficit  Skin: Skin is warm and dry  No rash noted  Psychiatric: He has a normal mood and affect  His behavior is normal  Judgment and thought content normal    Nursing note and vitals reviewed  HPI:  Sarah Ross is a 80 y o  male here for his Subsequent Wellness Visit      Patient Active Problem List   Diagnosis    Carpal tunnel syndrome of left wrist    Tendinitis of left rotator cuff    Biceps tendonitis on left     Past Medical History:   Diagnosis Date    AC joint arthropathy     BPH (benign prostatic hyperplasia)     Carpal tunnel syndrome     bilateral    Chronic prostatitis     last assessed: 3/1/2014    Ganglion     last assessed: 10/7/2013    GERD without esophagitis     Hyperlipidemia     Impingement syndrome of right shoulder     last assessed: 3/22/2016    Left inguinal hernia     description: repaired by Dr Satinder Cho Peyronie's disease     Pneumonia of left upper lobe due to infectious organism [facility])     last assessed: 2/8/2016    Sexual dysfunction     Stomach disorder      Past Surgical History:   Procedure Laterality Date    COLONOSCOPY      COLONOSCOPY      CYSTOSCOPY      Diagnostic last assessed: 6/12/2014    CYSTOSTOMY      urethral stricture    HERNIA REPAIR      NEUROPLASTY / TRANSPOSITION MEDIAN NERVE AT CARPAL TUNNEL      AK ANKLE SCOPE,PLANTAR FASCIOTOMY Left 11/18/2016    Procedure: PLANTAR FASCIOTOMY;  Surgeon: Enoch Salguero DPM;  Location: [facility];  Service: Podiatry    AK EXCIS PRIMARY GANGLION WRIST Left 1/24/2018    Procedure: WRIST RESECTION VOLAR GANGLION CYST;  Surgeon: Willard Hoang MD;  [facility];  Service: Orthopedics    MS REVISE MEDIAN N/CARPAL TUNNEL SURG Right 8/24/2016    Procedure: CARPAL TUNNEL RELEASE ;  Surgeon: Willard Hoang MD;  [facility];  Service: Orthopedics    MS REVISE MEDIAN N/CARPAL TUNNEL SURG Left 1/24/2018    Procedure: RELEASE CARPAL TUNNEL;  Surgeon: Willard Hoang MD;  [facility];  Service: Orthopedics     Family History   Problem Relation Age of Onset    Cancer Father      bladder     History   Smoking Status    Former Smoker    Quit date: 2014   Smokeless Tobacco    Never Used     History   Alcohol Use    Yes     Comment: social and denies alcohol use causing problems       History   Drug Use No     /70 (BP Location: Right arm, Patient Position: Sitting)   Temp 100 3 °F (37 9 °C) (Tympanic)   Ht 5' 9" (1 753 m)   Wt 109 kg (240 lb)   BMI 35 44 kg/m²       Current Outpatient Prescriptions   Medication Sig Dispense Refill    Cholecalciferol (VITAMIN D PO) Take by mouth      Coenzyme Q10 (CO Q 10 PO) Take 200 mg by mouth      Loratadine (CLARITIN PO) Take by mouth daily      pantoprazole (PROTONIX) 40 mg tablet Take 1 tablet (40 mg total) by mouth daily 90 tablet 1    simvastatin (ZOCOR) 40 mg tablet Simvastatin 40 MG Oral Tablet  TAKE 1 TABLET BY MOUTH DAILY AT BEDTIME   Quantity: 90;  Refills: 1       Alex Mayen M D ;  Started 4-Oct-2015  Active      azithromycin (ZITHROMAX) 250 mg tablet 2 tabs on day 1 and 1 tab for next 4 days 6 tablet 0    HYDROcodone-homatropine (HYCODAN) 5-1 5 mg/5 mL syrup Take 5 mL by mouth 2 (two) times a day as needed for cough Max Daily Amount: 10 mL 120 mL 0     No current facility-administered medications for this visit        No Known Allergies  Immunization History   Administered Date(s) Administered    Pneumococcal Polysaccharide PPV23 04/06/2016    Tdap 04/21/2012    Zoster 09/08/2014 Patient Care Team:  Brandin Love DO as PCP - General (Family Medicine)  Marcelene Lazar, MD Nadeen Gowers, MD Isidro Friday, MD      Medicare Screening Tests and Risk Assessments:  AWV Clinical     ISAR:   Previous hospitalizations?:  No       Once in a Lifetime Medicare Screening:   EKG performed?:  No    AAA screening performed? (if performed, please add date to Health Maintenance):  No       Medicare Screening Tests and Risk Assessment:   AAA Risk Assessment    None Indicated:  Yes    Osteoporosis Risk Assessment    None indicated:  Yes    HIV Risk Assessment    None indicated:  Yes        Drug and Alcohol Use:   Tobacco use    Cigarettes:  former smoker    Smokeless:  never used smokeless tobacco    Tobacco use duration    Tobacco Cessation Readiness    Alcohol use    Alcohol use:  occasional use    Alcohol Treatment Readiness   Illicit Drug Use    Drug use:  never    Drug type:  no sedative use       Diet & Exercise:   Diet   What is your diet?:  Limited junk food   How many servings a day of the following:   Exercise    Do you currently exercise?:  yes    Frequency:  daily   Times per week:  3        Cognitive Impairment Screening:   Anxiety screenings preformed:  Yes    Depression screening preformed:  Yes    Cognitive Impairment Screening    Do you have difficulty learning or retaining new information?:  No Do you have difficulty handling new tasks?:  No   Do you have difficulty with reasoning?:  No Do you have difficulty with spatial ability and orientation?:  No   Do you have difficulty with language?:  No Do you have difficulty with behavior?:  No       Functional Ability/Level of Safety:   Hearing    Hearing difficulties:  No Bilateral:  normal   Left:  normal Right:  normal   Hearing Impairment Assessment    Hearing status:  No impairment   Current Activities    Status:  unlimited ADL's, unlimited IADL's, unlimited social activities, unlimited driving   Help needed with the folllowing:    Using the phone:  No Transportation:  No   Shopping:  No Preparing Meals:  No   Doing Housework:  No Doing Laundry:  No   Managing Medications:  No Managing Money:  No   ADL    Feeding:  Independant   Oral hygiene and Facial grooming:  Independant   Bathing:  Independant   Upper Body Dressing:  Independant   Lower Body Dressing:  Independant   Toileting:  Independant   Bed Mobility:  Independant   Fall Risk   Have you fallen in the last 12 months?:  No Are you unsteady on your feet?:  No   Injury History   Polypharmacy:  No Antidepressant Use:  No   Sedative Use:  No Antihypertensive Use:  No   Previous Fall:  No Alcohol Use:  No   Deconditioning:  No Visual Impairment:  No   Cogitive Impairment:  No Mmobility Impairment:  No   Postural Hypotension:  No Urinary Incontinence:  No       Home Safety:   Are there hazards in your environment?:  No   If you fell, would you need help to get back up from the ground?:  No    Do you feel unsteady when walking?:  No    Do you have handrails and grab-bars in the home?:  Yes    Home Safety Risk Factors   Unfamilar with surroundings:  No Uneven floors:  No   Stairs or handrail saftey risk:  No Loose rugs:  No   Household clutter:  No Poor household lighting:  No   No grab bars in bathroom:  No Further evaluation needed:  No       Advanced Directives:   Advanced Directives     Durable POA for healthcare:   Yes   Advanced directive:  Yes    Patient's End of Life Decisions    Reviewed with patient:  Yes Provider agrees with end of life decisions:  Yes       Urinary Incontinence:       Glaucoma:            Provider Screening     Preventative Screening/Counseling:   Cardiovascular Screening/Counseling:   (Labs Q5 years, EKG optional one-time)     Due for: Lab Panel/Analytes:  Lipid Panel         Diabetes Screening/Counseling:   (2 tests/year if Pre-Diabetes or 1 test/year if no Diabetes)     Due for: Labs:  Blood Glucose         Colorectal Cancer Screening/Counseling:   (FOBT Q1 yr; Flex Sig Q4 yrs or Q10 yrs after Screening Colonoscopy; Screening Colonoscpy Q2 yrs High Risk or Q10 yrs Low Risk; Barium Enema Q2 yrs High Risk or Q4 yrs Low Risk)   General:  Screening Current           Prostate Cancer Screening/Counseling:   (Annual)     Due for: Labs:  PSA         Breast Cancer Screening/Counseling:   (Baseline Age 28 - 43; Annual Age 36+)         Cervical Cancer Screening/Counseling:   (Annual for High Risk or Childbearing Age with Abnormal Pap in Last 3 yrs; Every 2 all others)         Osteoporosis Screening/Counseling:   (Every 2 Yrs if at risk or more if medically necessary)   General:  Screening Not Indicated           AAA Screening/Counseling:   (Once per Lifetime with risk factors)    General:  Screening Not Indicated           Glaucoma Screening/Counseling:   (Annual)   General:  Screening Current          HIV Screening/Counseling:   (Voluntary; Once annually for high risk OR 3 times for Pregnancy at diagnosis of IUP; 3rd trimester; and at Labor   General:  Screening Not Indicated           Hepatitis C Screening:             Immunizations:   Influenza (annual):  Patient Declines   Pneumococcal (Once in a Lifetime):  Lifetime Vaccine Completed   Zostavax (Medicare D Coverage, Pt >66 yo):  Zostavax Vaccine UTD   TD (Non-Medicare Wellness  Visit required): Td Vaccine UTD   Tdap (Non-Medicare Wellness Visit required):   Tdap Vaccine UTD       Other Preventative Couseling (Non-Medicare Wellness Visit Required):   nutrition counseling performed, weight reduction was discussed, Increased physical activity counseling given       Referrals (Non-Medicare Wellness Visit Required):       Medical Equipment/Suppliers:            Visual Acuity Screening    Right eye Left eye Both eyes   Without correction:      With correction: 20/20 20/20      Reviewed Updated [facility] Prior Wellness Visits:   Last Medicare wellness visit information was reviewed, patient interviewed , no change since last AWVyes  Last Medicare wellness visit information was reviewed, patient interviewed and updates made to the record today yes    Assessment and Plan:  1  Medicare annual wellness visit, subsequent     2  Smoking  CT lung screening program   3   Pneumonia due to infectious organism, unspecified laterality, unspecified part of lung  azithromycin (ZITHROMAX) 250 mg tablet    HYDROcodone-homatropine (HYCODAN) 5-1 5 mg/5 mL syrup       Health Maintenance Due   Topic Date Due    Hepatitis C Screening  1950    ABDOMINAL AORTIC ANEURYSM (AAA) SCREEN  03/12/2016    INFLUENZA VACCINE  09/01/2017    GLAUCOMA SCREENING 67+ YR  03/12/2018

## 2018-03-20 ENCOUNTER — OFFICE VISIT (OUTPATIENT)
Dept: PHYSICAL THERAPY | Facility: CLINIC | Age: 68
End: 2018-03-20
Payer: MEDICARE

## 2018-03-20 DIAGNOSIS — M75.82 ROTATOR CUFF TENDONITIS, LEFT: Primary | ICD-10-CM

## 2018-03-20 DIAGNOSIS — M67.432 GANGLION OF LEFT WRIST: ICD-10-CM

## 2018-03-20 DIAGNOSIS — M75.82 TENDINITIS OF LEFT ROTATOR CUFF: ICD-10-CM

## 2018-03-20 DIAGNOSIS — G56.02 CARPAL TUNNEL SYNDROME OF LEFT WRIST: ICD-10-CM

## 2018-03-20 DIAGNOSIS — M75.22 BICEPS TENDONITIS ON LEFT: ICD-10-CM

## 2018-03-20 PROCEDURE — 97110 THERAPEUTIC EXERCISES: CPT

## 2018-03-20 PROCEDURE — 97140 MANUAL THERAPY 1/> REGIONS: CPT

## 2018-03-23 ENCOUNTER — OFFICE VISIT (OUTPATIENT)
Dept: PHYSICAL THERAPY | Facility: CLINIC | Age: 68
End: 2018-03-23
Payer: MEDICARE

## 2018-03-23 DIAGNOSIS — G56.02 CARPAL TUNNEL SYNDROME OF LEFT WRIST: ICD-10-CM

## 2018-03-23 DIAGNOSIS — M67.432 GANGLION OF LEFT WRIST: ICD-10-CM

## 2018-03-23 DIAGNOSIS — M75.82 TENDINITIS OF LEFT ROTATOR CUFF: ICD-10-CM

## 2018-03-23 DIAGNOSIS — M75.22 BICEPS TENDONITIS ON LEFT: ICD-10-CM

## 2018-03-23 DIAGNOSIS — M75.82 ROTATOR CUFF TENDONITIS, LEFT: Primary | ICD-10-CM

## 2018-03-23 PROCEDURE — 97140 MANUAL THERAPY 1/> REGIONS: CPT | Performed by: PHYSICAL THERAPIST

## 2018-03-23 NOTE — PROGRESS NOTES
Daily Note     Today's date: 3/23/2018  Patient name: Rafy Sanchez  : 1950  MRN: [id number]  Referring provider: Sasha Smith MD  Dx:   Encounter Diagnosis     ICD-10-CM    1  Rotator cuff tendonitis, left M75 82    2  Biceps tendonitis on left M75 22    3  Tendinitis of left rotator cuff M75 82    4  Ganglion of left wrist M67 432    5  Carpal tunnel syndrome of left wrist G56 02                   Subjective: Pt notes that his hand is feeling better but his shoulder is still very painful      Objective: See treatment diary below    Precautions: recent left Carpal tunnel release and ganglion cyst excision  Manuals 3/4/18 3/6 3/8 3/13 3/15 3/19 3/23/18      Shoulder mobs grade 3 post and inf mobs nv nv  np 4' nv 5'      Shoulder prom all planes nv Not kendra  np 5' IR/ER 4' 6'      Left shldr pec infraspinatus and subscap tpr nv 8 8 6' 6' 6' 8'      Left wrist scar mobilization nv 6 8 8' 5' 5' 7'      Ktape for scpa ret and ghjt compression       4'      Exercise Diary     3/13 3/15 3/19       UBE nv nv           Shoulder rows and ext Blue nv Green  2x10  np         Shoulder Er  Green nv grn  2x10  np         Shoulder IR Dbl green nv Dbl grn  2x10  np         Wrist 4 way Green nv Green  2x10 all  np  Green 2x10 nv      Pro sup (stick) 1# nv 0#  2x10  0#  2x10  0# 2x10 nv      gripper 50# nv 50#  2x10  50#  2x10  50# 2x10 nv      finkelstein stretch    15"x3  15"x3 nv      Shoulder iso    Abd, extx10         S/l ER     x10 x10 nv      S/l abd (25-90deg)     x10 x10 nv      Prone rows     x10 x15 nv      Prone ext     x10 x10 nv      Prone Habd     2x10 unable to complete                                                                                                               Modalities  3/8 3/13    3/20        CP with TENS shouder  15'  seated Post treatment  declinced  10'                     Assessment: Trialed Ktape for scap retraction, and [facility] jt compression    Plan: Continue per plan of care

## 2018-03-27 ENCOUNTER — OFFICE VISIT (OUTPATIENT)
Dept: PHYSICAL THERAPY | Facility: CLINIC | Age: 68
End: 2018-03-27
Payer: MEDICARE

## 2018-03-27 DIAGNOSIS — M75.22 BICEPS TENDONITIS ON LEFT: ICD-10-CM

## 2018-03-27 DIAGNOSIS — G56.02 CARPAL TUNNEL SYNDROME OF LEFT WRIST: ICD-10-CM

## 2018-03-27 DIAGNOSIS — M75.82 ROTATOR CUFF TENDONITIS, LEFT: Primary | ICD-10-CM

## 2018-03-27 DIAGNOSIS — M67.432 GANGLION OF LEFT WRIST: ICD-10-CM

## 2018-03-27 DIAGNOSIS — M75.82 TENDINITIS OF LEFT ROTATOR CUFF: ICD-10-CM

## 2018-03-27 PROCEDURE — 97014 ELECTRIC STIMULATION THERAPY: CPT

## 2018-03-27 PROCEDURE — 97140 MANUAL THERAPY 1/> REGIONS: CPT

## 2018-03-27 NOTE — PROGRESS NOTES
Daily Note     Today's date: 3/27/2018  Patient name: Ciera Velásquez  : 1950  MRN: [id number]  Referring provider: Abeba Lynn MD  Dx:   Encounter Diagnosis     ICD-10-CM    1  Rotator cuff tendonitis, left M75 82    2  Biceps tendonitis on left M75 22    3  Tendinitis of left rotator cuff M75 82    4  Ganglion of left wrist M67 432    5  Carpal tunnel syndrome of left wrist G56 02                   Subjective: Pt reported an increase in L shoulder pain since last session  He noted he is frustrated with his lack of progress and has been unable to perform functional activities without pain  Pt also reported that he sees his doctor Thursday        Objective: See treatment diary below  Precautions: recent left Carpal tunnel release and ganglion cyst excision  Manuals 3/4/18 3/6 3/8 3/13 3/15 3/19 3/23/18      Shoulder mobs grade 3 post and inf mobs nv nv  np 4' nv 5'      Shoulder prom all planes nv Not kendra  np 5' IR/ER 4' 6'      Left shldr pec infraspinatus and subscap tpr nv 8 8 6' 6' 6' 8'      Left wrist scar mobilization nv 6 8 8' 5' 5' 7'      Ktape for scpa ret and ghjt compression       4'      Exercise Diary     3/13 3/15 3/19       UBE nv nv           Shoulder rows and ext Blue nv Green  2x10  np         Shoulder Er  Green nv grn  2x10  np         Shoulder IR Dbl green nv Dbl grn  2x10  np         Wrist 4 way Green nv Green  2x10 all  np  Green 2x10 nv      Pro sup (stick) 1# nv 0#  2x10  0#  2x10  0# 2x10 nv      gripper 50# nv 50#  2x10  50#  2x10  50# 2x10 nv      finkelstein stretch    15"x3  15"x3 nv      Shoulder iso    Abd, extx10         S/l ER     x10 x10 nv      S/l abd (25-90deg)     x10 x10 nv      Prone rows     x10 x15 nv      Prone ext     x10 x10 nv      Prone Habd     2x10 unable to complete                                                                                                               Modalities  3/8 3/13    3/20   3/27     CP with TENS shouder  15'  seated Post treatment  declinced  10' 10'                      Assessment: PT assessed pt L shoulder through PROM, pt had increased pain with movement  PT was unable to abduct the arm passively more than 30 degrees flex more than 20 degrees before greatly increased pain  PT was unable to perform grade 1-2 mobs with out increased pain, and compression and/or distraction didn't change pt symptoms  PT made decision to hold exercises until pt sees his doctor on Thursday  Plan: Pt to get update from MD before next visit    Patient treated by EBONIE Ortiz under my direct supervision

## 2018-03-29 ENCOUNTER — APPOINTMENT (OUTPATIENT)
Dept: PHYSICAL THERAPY | Facility: CLINIC | Age: 68
End: 2018-03-29
Payer: MEDICARE

## 2018-03-29 ENCOUNTER — OFFICE VISIT (OUTPATIENT)
Dept: OBGYN CLINIC | Facility: CLINIC | Age: 68
End: 2018-03-29
Payer: MEDICARE

## 2018-03-29 VITALS
BODY MASS INDEX: 36.29 KG/M2 | HEIGHT: 69 IN | HEART RATE: 75 BPM | WEIGHT: 245 LBS | DIASTOLIC BLOOD PRESSURE: 80 MMHG | SYSTOLIC BLOOD PRESSURE: 128 MMHG

## 2018-03-29 DIAGNOSIS — M75.82 TENDINITIS OF LEFT ROTATOR CUFF: Primary | ICD-10-CM

## 2018-03-29 PROCEDURE — 99213 OFFICE O/P EST LOW 20 MIN: CPT | Performed by: ORTHOPAEDIC SURGERY

## 2018-03-29 NOTE — ASSESSMENT & PLAN NOTE
50-year-old male with left shoulder pain  He did not get significant relief from a cortisone injection nor from physical therapy  He is having difficulty even participating with therapy as pain is so severe  We will get an MRI of the shoulder to evaluate for any rotator cuff tears or other pathology with a plan for preoperative surgical planning  I will see him back after the MRI

## 2018-03-29 NOTE — PATIENT INSTRUCTIONS
Shoulder Stretches      It is very important that you work aggressively on your shoulder range of motion  For each of the exercises listed below, the more frequently they are done, the longer the stretch is held, and the harder the stretch is pushed, the better the results will be  At minimum, recommendation is to do each of the stretches 2 to 3 times a day, but you can do them up to once every hour  1  Wall Crawls- Forward:  Stand facing a wall  Put your hand on the wall in front of you, crawl the hand up the wall as high as it will go  As the hand goes higher up on the wall, you will need to step closer to the wall  Once you get the hand up as high as it can go, lean/bow forward at your waist, getting a stretch in the shoulder  Hold this for 40 seconds  Stand up without removing your hand from the wall for 10 seconds  Then, lean/bow forward again for another 40 seconds  Rest by standing up, but without removing your hand from the wall  Crawl the hand up the wall a little bit more after 10 seconds and lean into one final stretch  You may then slowly lower your hand down  2  Wall Crawls- Sideways: Stand sideways to a wall  Put your hand on the wall to the side of you, crawling the hand up the wall as high as it will go  As the hand goes higher up on the wall, you will need to step closer to the wall  Once you get the hand up as high as it can go, lean sideways at your waist toward the wall, kicking your hip out away from the wall, getting a stretch in the shoulder  Hold this for 40 seconds  Stand up without removing your hand from the wall for 10 seconds  Then, lean sideways again for another 40 seconds  Rest by standing up, but without removing your hand from the wall  Crawl the hand up the wall a little bit more after 10 seconds and lean into one final stretch  You may then slowly lower your hand down        3  Outward Rotation Stretch:  Standing, keep your elbow, bent at ninety degrees, at your side  Place your hand on a wall or doorjamb and rotate your body away so that the arm is stretched outward  The elbow should remain at your side and you should feel a stretch on the front of your shoulder  Hold the stretch for 40 seconds  Rotate back for 10 seconds for a rest   Repeat 2 more times

## 2018-03-29 NOTE — PROGRESS NOTES
Assessment:     1  Tendinitis of left rotator cuff        Plan:     Problem List Items Addressed This Visit        Musculoskeletal and Integument    Tendinitis of left rotator cuff - Primary     25-year-old male with left shoulder pain  He did not get significant relief from a cortisone injection nor from physical therapy  He is having difficulty even participating with therapy as pain is so severe  We will get an MRI of the shoulder to evaluate for any rotator cuff tears or other pathology with a plan for preoperative surgical planning  I will see him back after the MRI  Relevant Orders    MRI shoulder left wo contrast         Subjective:     Patient ID: Anna Bethea is a 80 y o  male  Chief Complaint:  25-year-old male here today to follow-up on his left shoulder  The left shoulder has tenderness anterior medially on the proximal humerus  Overhead activity her abduction is what is most painful to him  He is having a lot of pain and discomfort in the shoulder  On his last visit he was given a cortisone injection which she states did not help at all  He has been working with physical therapy but has had trouble working with them because the pain is so much  Moving it is very painful for him  He has a lot of difficulty sleeping at night  He is anxious to have this problem addressed        Allergy:  No Known Allergies  Medications:  all current active meds have been reviewed  Past Medical History:  Past Medical History:   Diagnosis Date    AC joint arthropathy     BPH (benign prostatic hyperplasia)     Carpal tunnel syndrome     bilateral    Chronic prostatitis     last assessed: 3/1/2014    Ganglion     last assessed: 10/7/2013    GERD without esophagitis     Hyperlipidemia     Impingement syndrome of right shoulder     last assessed: 3/22/2016    Left inguinal hernia     description: repaired by Dr Feliz Honey Peyronie's disease     Pneumonia of left upper lobe due to infectious organism [facility])     last assessed: 2/8/2016    Sexual dysfunction     Stomach disorder      Past Surgical History:  Past Surgical History:   Procedure Laterality Date    COLONOSCOPY      COLONOSCOPY      CYSTOSCOPY      Diagnostic last assessed: 6/12/2014    CYSTOSTOMY      urethral stricture    HERNIA REPAIR      NEUROPLASTY / TRANSPOSITION MEDIAN NERVE AT CARPAL TUNNEL      DE ANKLE SCOPE,PLANTAR FASCIOTOMY Left 11/18/2016    Procedure: PLANTAR FASCIOTOMY;  Surgeon: Medardo Yao DPM;  [facility];  Service: Podiatry    DE EXCIS PRIMARY GANGLION WRIST Left 1/24/2018    Procedure: WRIST RESECTION VOLAR GANGLION CYST;  Surgeon: Marianela Sanchez MD;  [facility];  Service: Orthopedics    DE REVISE MEDIAN N/CARPAL TUNNEL SURG Right 8/24/2016    Procedure: CARPAL TUNNEL RELEASE ;  Surgeon: Marianela Sanchez MD;  [facility];  Service: Orthopedics    DE REVISE MEDIAN N/CARPAL TUNNEL SURG Left 1/24/2018    Procedure: RELEASE CARPAL TUNNEL;  Surgeon: Marianela Sanchez MD;  [facility];  Service: Orthopedics     Family History:  Family History   Problem Relation Age of Onset    Cancer Father      bladder     Social History:  History   Alcohol Use    Yes     Comment: social and denies alcohol use causing problems      History   Drug Use No     History   Smoking Status    Former Smoker    Quit date: 2014   Smokeless Tobacco    Never Used     Review of Systems   Constitutional: Negative  HENT: Negative  Eyes: Negative  Respiratory: Negative  Cardiovascular: Negative  Gastrointestinal: Negative  Endocrine: Negative  Genitourinary: Negative  Musculoskeletal: Positive for arthralgias, joint swelling and myalgias  Skin: Negative  Allergic/Immunologic: Negative  Neurological: Negative  Hematological: Negative  Psychiatric/Behavioral: Negative            Objective:  BP Readings from Last 1 Encounters:   03/29/18 128/80      Wt Readings from Last 1 Encounters:   03/29/18 111 kg (245 lb)      BMI:   Estimated body mass index is 36 18 kg/m² as calculated from the following:    Height as of this encounter: 5' 9" (1 753 m)  Weight as of this encounter: 111 kg (245 lb)  BSA:   Estimated body surface area is 2 25 meters squared as calculated from the following:    Height as of this encounter: 5' 9" (1 753 m)  Weight as of this encounter: 111 kg (245 lb)  Physical Exam    Left Shoulder Exam     Tenderness   Left shoulder tenderness location: Tender to palpation over the posterior periscapular muscles primarily, with some mild tenderness around the bursa and anterior shoulder  Range of Motion   Forward Flexion: 130 (Passive forward flexion 150)   External Rotation: normal   Internal Rotation 0 degrees: T1     Muscle Strength   The patient has normal left shoulder strength (Pain with resisted internal rotation and abduction)  Tests   Cross Arm: negative  Drop Arm: negative  Hawkin's test: negative  Impingement: positive  Sulcus: absent    Other   Erythema: absent  Sensation: normal  Pulse: present     Comments:  Positive speed's, positive [place]'s, no crepitus on passive range of motion            I have personally reviewed pertinent films in PACS      Procedures

## 2018-04-03 ENCOUNTER — HOSPITAL ENCOUNTER (OUTPATIENT)
Dept: MRI IMAGING | Facility: HOSPITAL | Age: 68
Discharge: HOME | End: 2018-04-03
Attending: ORTHOPAEDIC SURGERY

## 2018-04-03 DIAGNOSIS — M75.82 TENDINITIS OF LEFT ROTATOR CUFF: ICD-10-CM

## 2018-04-04 ENCOUNTER — TELEPHONE (OUTPATIENT)
Dept: OBGYN CLINIC | Facility: HOSPITAL | Age: 68
End: 2018-04-04

## 2018-04-04 DIAGNOSIS — M77.8 LEFT SHOULDER TENDONITIS: Primary | ICD-10-CM

## 2018-04-04 RX ORDER — DIAZEPAM 10 MG/1
TABLET ORAL
Qty: 2 TABLET | Refills: 0 | Status: SHIPPED | OUTPATIENT
Start: 2018-04-04 | End: 2018-09-20 | Stop reason: ALTCHOICE

## 2018-04-04 RX ORDER — DIAZEPAM 10 MG/1
TABLET ORAL
Qty: 2 TABLET | Refills: 0 | Status: SHIPPED | OUTPATIENT
Start: 2018-04-04 | End: 2018-04-04

## 2018-04-04 NOTE — TELEPHONE ENCOUNTER
Called patient letting him know that is script is here for   He asked if it could be sent to [facility] and I let him know that we cannot send this to the pharmacy  The script needs to be a written one

## 2018-04-04 NOTE — TELEPHONE ENCOUNTER
Caller: patient   Call back number: [phone]  Patient's doctor: Dr Loyda Galvan    Patient called stating that he tried to have the MRI done but is claustrophobic  He is asking for a sedative   Please advise

## 2018-04-12 ENCOUNTER — HOSPITAL ENCOUNTER (OUTPATIENT)
Dept: RADIOLOGY | Facility: HOSPITAL | Age: 68
Discharge: HOME | End: 2018-04-12
Attending: ORTHOPAEDIC SURGERY
Payer: MEDICARE

## 2018-04-12 PROCEDURE — 73221 MRI JOINT UPR EXTREM W/O DYE: CPT

## 2018-04-18 ENCOUNTER — OFFICE VISIT (OUTPATIENT)
Dept: OBGYN CLINIC | Facility: CLINIC | Age: 68
End: 2018-04-18
Payer: MEDICARE

## 2018-04-18 VITALS — HEIGHT: 69 IN | SYSTOLIC BLOOD PRESSURE: 123 MMHG | DIASTOLIC BLOOD PRESSURE: 82 MMHG | HEART RATE: 87 BPM

## 2018-04-18 DIAGNOSIS — M75.22 BICEPS TENDONITIS ON LEFT: ICD-10-CM

## 2018-04-18 DIAGNOSIS — M75.82 TENDINITIS OF LEFT ROTATOR CUFF: Primary | ICD-10-CM

## 2018-04-18 PROCEDURE — 99213 OFFICE O/P EST LOW 20 MIN: CPT | Performed by: ORTHOPAEDIC SURGERY

## 2018-04-18 NOTE — ASSESSMENT & PLAN NOTE
Left shoulder tendinitis of the rotator cuff, bursitis, and some mild biceps tendinosis  Reviewed the MRI with the patient  We discussed treatment options and plans  He would like to go back to physical therapy at this time  It is too early for another injection at this time  If he wishes to have one a give him one a month  At some point if he fails to have good long-term relief he would be a candidate for a shoulder arthroscopy, decompression, and possible biceps tenodesis

## 2018-04-18 NOTE — PROGRESS NOTES
Assessment:     1  Tendinitis of left rotator cuff    2  Biceps tendonitis on left        Plan:     Problem List Items Addressed This Visit        Musculoskeletal and Integument    Tendinitis of left rotator cuff - Primary     Left shoulder tendinitis of the rotator cuff, bursitis, and some mild biceps tendinosis  Reviewed the MRI with the patient  We discussed treatment options and plans  He would like to go back to physical therapy at this time  It is too early for another injection at this time  If he wishes to have one a give him one a month  At some point if he fails to have good long-term relief he would be a candidate for a shoulder arthroscopy, decompression, and possible biceps tenodesis  Relevant Orders    Ambulatory referral to Physical Therapy    Biceps tendonitis on left         Subjective:     Patient ID: Madeline Fletcher is a 80 y o  male  Chief Complaint:  56-year-old male here today to follow-up on his left shoulder  The left shoulder has tenderness anterior medially on the proximal humerus  Overhead activity her abduction is what is most painful to him  He is having a lot of pain and discomfort in the shoulder  He stepped working with physical therapy because the pain was so much  The cortisone injection did not give him any significant relief  Some activities are very painful were other activities are not so bad  He returns today with an MRI        Allergy:  No Known Allergies  Medications:  all current active meds have been reviewed  Past Medical History:  Past Medical History:   Diagnosis Date    AC joint arthropathy     BPH (benign prostatic hyperplasia)     Carpal tunnel syndrome     bilateral    Chronic prostatitis     last assessed: 3/1/2014    Ganglion     last assessed: 10/7/2013    GERD without esophagitis     Hyperlipidemia     Impingement syndrome of right shoulder     last assessed: 3/22/2016    Left inguinal hernia     description: repaired by Dr Mckinley Appl Peyronie's disease     Pneumonia of left upper lobe due to infectious organism [facility])     last assessed: 2/8/2016    Sexual dysfunction     Stomach disorder      Past Surgical History:  Past Surgical History:   Procedure Laterality Date    COLONOSCOPY      COLONOSCOPY      CYSTOSCOPY      Diagnostic last assessed: 6/12/2014    CYSTOSTOMY      urethral stricture    HERNIA REPAIR      NEUROPLASTY / TRANSPOSITION MEDIAN NERVE AT CARPAL TUNNEL      MO ANKLE SCOPE,PLANTAR FASCIOTOMY Left 11/18/2016    Procedure: PLANTAR FASCIOTOMY;  Surgeon: Tiffanie Bird DPM;  [facility];  Service: Podiatry    MO EXCIS PRIMARY GANGLION WRIST Left 1/24/2018    Procedure: WRIST RESECTION VOLAR GANGLION CYST;  Surgeon: James Miles MD;  [facility];  Service: Orthopedics    MO REVISE MEDIAN N/CARPAL TUNNEL SURG Right 8/24/2016    Procedure: CARPAL TUNNEL RELEASE ;  Surgeon: James Miles MD;  [facility];  Service: Orthopedics    MO REVISE MEDIAN N/CARPAL TUNNEL SURG Left 1/24/2018    Procedure: RELEASE CARPAL TUNNEL;  Surgeon: James Miles MD;  [facility];  Service: Orthopedics     Family History:  Family History   Problem Relation Age of Onset    Cancer Father      bladder     Social History:  History   Alcohol Use    Yes     Comment: social and denies alcohol use causing problems      History   Drug Use No     History   Smoking Status    Former Smoker    Quit date: 2014   Smokeless Tobacco    Never Used     Review of Systems   Constitutional: Negative  HENT: Negative  Eyes: Negative  Respiratory: Negative  Cardiovascular: Negative  Gastrointestinal: Negative  Endocrine: Negative  Genitourinary: Negative  Musculoskeletal: Positive for arthralgias  Skin: Negative  Allergic/Immunologic: Negative  Neurological: Negative  Hematological: Negative  Psychiatric/Behavioral: Negative            Objective:  BP Readings from Last 1 Encounters:   04/18/18 123/82 Wt Readings from Last 1 Encounters:   03/29/18 111 kg (245 lb)      BMI:   Estimated body mass index is 36 18 kg/m² as calculated from the following:    Height as of 3/29/18: 5' 9" (1 753 m)  Weight as of 3/29/18: 111 kg (245 lb)  BSA:   Estimated body surface area is 2 25 meters squared as calculated from the following:    Height as of 3/29/18: 5' 9" (1 753 m)  Weight as of 3/29/18: 111 kg (245 lb)  Physical Exam    Left Shoulder Exam     Tenderness   Left shoulder tenderness location: Tender to palpation over the posterior periscapular muscles primarily, with some mild tenderness around the bursa and anterior shoulder  Range of Motion   Forward Flexion: 140 (Passive forward flexion 150)   External Rotation: normal   Internal Rotation 0 degrees: T1     Muscle Strength   The patient has normal left shoulder strength (Pain with resisted internal rotation and abduction)  Tests   Cross Arm: negative  Drop Arm: negative  Hawkin's test: negative  Impingement: positive  Sulcus: absent    Other   Erythema: absent  Sensation: normal  Pulse: present     Comments:  Positive speed's, positive [place]'s, no crepitus on passive range of motion            I have personally reviewed pertinent films in PACS  MRI of the left shoulder is reviewed which shows no discrete tears of the rotator cuff, labrum, or biceps  There is some tendinitis and bursitis around the shoulder      Procedures

## 2018-04-20 ENCOUNTER — HOSPITAL ENCOUNTER (OUTPATIENT)
Dept: CT IMAGING | Facility: CLINIC | Age: 68
Discharge: HOME | End: 2018-04-20
Payer: COMMERCIAL

## 2018-04-20 DIAGNOSIS — F17.200 SMOKING: ICD-10-CM

## 2018-04-26 ENCOUNTER — EVALUATION (OUTPATIENT)
Dept: PHYSICAL THERAPY | Facility: CLINIC | Age: 68
End: 2018-04-26
Payer: MEDICARE

## 2018-04-26 DIAGNOSIS — E78.5 HYPERLIPIDEMIA, UNSPECIFIED HYPERLIPIDEMIA TYPE: Primary | ICD-10-CM

## 2018-04-26 DIAGNOSIS — M75.22 BICEPS TENDONITIS ON LEFT: ICD-10-CM

## 2018-04-26 DIAGNOSIS — M75.82 ROTATOR CUFF TENDONITIS, LEFT: Primary | ICD-10-CM

## 2018-04-26 PROCEDURE — 97140 MANUAL THERAPY 1/> REGIONS: CPT | Performed by: PHYSICAL THERAPIST

## 2018-04-26 PROCEDURE — 97164 PT RE-EVAL EST PLAN CARE: CPT | Performed by: PHYSICAL THERAPIST

## 2018-04-26 PROCEDURE — G8990 OTHER PT/OT CURRENT STATUS: HCPCS | Performed by: PHYSICAL THERAPIST

## 2018-04-26 PROCEDURE — 97110 THERAPEUTIC EXERCISES: CPT | Performed by: PHYSICAL THERAPIST

## 2018-04-26 PROCEDURE — G8991 OTHER PT/OT GOAL STATUS: HCPCS | Performed by: PHYSICAL THERAPIST

## 2018-04-26 RX ORDER — SIMVASTATIN 40 MG
40 TABLET ORAL
Qty: 90 TABLET | Refills: 1 | Status: SHIPPED | OUTPATIENT
Start: 2018-04-26 | End: 2018-10-25 | Stop reason: SDUPTHER

## 2018-04-26 NOTE — PROGRESS NOTES
PT Evaluation     Today's date: 2018  Patient name: Keyana Dowlel  : 1950  MRN: [id number]  Referring provider: Sara Mackey MD  Dx:   Encounter Diagnosis     ICD-10-CM    1  Rotator cuff tendonitis, left M75 82    2  Biceps tendonitis on left M75 22                   Assessment  Impairments: abnormal muscle firing, abnormal muscle tone, abnormal or restricted ROM, abnormal movement, activity intolerance, impaired physical strength, lacks appropriate home exercise program, pain with function and weight-bearing intolerance    Assessment details: Pt is a 78 y o  male that presents with decreased strength, increased pain, decreased rom, functional limitations, and symptoms of left shoulder tendonitis and probable bursitis, pt has had MRI to rule out any RTC tears  Pt would benefit from skilled PT to return him to a more functional condition  Understanding of Dx/Px/POC: good   Prognosis: good    Goals  1  Pt will have 0/10 pain at rest in 4 weeks  2  Pt will have <2/10 pain with activity in 6-8 weeks  3  Pt will have full arom of the left UE in 6 weeks  4  Pt will have full prom of the left UE in 3 weeks  5  Pt will be able to sleep on his side with out pain in 8 weeks  6  Pt will be able to lift 5# overhead with out pain in 6 weeks    Plan  Patient would benefit from: skilled PT  Referral necessary: No  Planned modality interventions: TENS, thermotherapy: hydrocollator packs and cryotherapy  Planned therapy interventions: neuromuscular re-education, manual therapy, therapeutic activities, therapeutic exercise and home exercise program  Frequency: 2x week  Duration in weeks: 8  Treatment plan discussed with: patient  Plan details: Poc ends 18        Subjective Evaluation    History of Present Illness  Mechanism of injury: Pt notes that his shoulder is feeling better  He notes that he went to the ortho who ordered an MRI and only found tendonitis, and some bursitis   Pt notes that he continues to have difficulty lifting overhead, as well as sleeping on his sides  Quality of life: good    Pain  Current pain ratin  At best pain ratin  At worst pain ratin  Location: shoulder left  Quality: dull ache, sharp and throbbing  Aggravating factors: lifting and overhead activity  Progression: worsening (slight improvement for wrist, worse for shoulder)    Patient Goals  Patient goals for therapy: decreased pain, increased motion, return to sport/leisure activities, independence with ADLs/IADLs and increased strength          Objective     Postural Observations  Seated posture: poor  Standing posture: poor  Correction of posture: has no consistent effect        Palpation   Left   Tenderness of the biceps, infraspinatus, latissimus, lower trapezius, middle deltoid, pectoralis major, pectoralis minor, posterior deltoid, subscapularis and supraspinatus  Cervical/Thoracic Screen   Cervical range of motion within normal limits  Thoracic range of motion within normal limits with the following exceptions: Limited t/s rot    Neurological Testing     Reflexes   Left   Biceps (C5/C6): normal (2+)  Brachioradialis (C6): normal (2+)  Triceps (C7): normal (2+)    Right   Biceps (C5/C6): normal (2+)  Brachioradialis (C6): normal (2+)  Triceps (C7): normal (2+)    Active Range of Motion   Left Shoulder   Flexion: 135 degrees with pain  External rotation 45°: 45 degrees with pain  Internal rotation 45°: 40 degrees with pain    Right Shoulder   Flexion: 165 degrees   External rotation 45°: 80 degrees   Internal rotation 45°: 60 degrees     Left Wrist   Wrist flexion: 55 degrees with pain  Wrist extension: 65 degrees with pain  Radial deviation: WFL  Ulnar deviation: WFL      Left Thumb   Opposition: Thumb rom wnl but painful with weber abduction and flexion    Joint Play   Left Shoulder  Hypermobile in the anterior capsule, posterior capsule and inferior capsule       Strength/Myotome Testing     Left Shoulder     Planes of Motion   Flexion: 4-   Extension: 4   Abduction: 3+   Adduction: 4   External rotation at 0°: 4-   Internal rotation at 0°: 4+     Right Shoulder     Planes of Motion   Flexion: 4+   Extension: 4+   Abduction: 4+   Adduction: 4+   External rotation at 0°: 4   Internal rotation at 0°: 4+     Left Wrist/Hand      (2nd hand position)     Trial 1: 55    Thumb Strength  Key/Lateral Pinch     Trail 1: 15    Right Wrist/Hand      (2nd hand position)     Trial 1: 85    Thumb Strength   Key/Lateral Pinch     Trial 1: 19    Tests     Left Shoulder   Positive Hawkin's, Neer's and passive horizontal adduction  Negative empty can and painful arc             Precautions: recent left Carpal tunnel release and ganglion cyst excision    Precautions: recent left Carpal tunnel release and ganglion cyst excision  Manuals 3/4/18 3/6 3/8 3/13 3/15 3/19 3/23/18 4/26/18     Shoulder mobs grade 3 post and inf mobs nv nv  np 4' nv 5' 3'     Shoulder prom all planes nv Not kendra  np 5' IR/ER 4' 6' 5'     Left shldr pec infraspinatus and subscap tpr nv 8 8 6' 6' 6' 8' 5'     Left wrist scar mobilization nv 6 8 8' 5' 5' 7'      Ktape for scpa ret and ghjt compression       4'      Exercise Diary     3/13 3/15 3/19       UBE nv nv      Shoulder iso x 6 5''x10 ea     Shoulder rows and ext Blue nv Green  2x10  np         Shoulder Er  Green nv grn  2x10  np         Shoulder IR Dbl green nv Dbl grn  2x10  np         Wrist 4 way Green nv Green  2x10 all  np  Green 2x10 nv      Pro sup (stick) 1# nv 0#  2x10  0#  2x10  0# 2x10 nv      gripper 50# nv 50#  2x10  50#  2x10  50# 2x10 nv      finkelstein stretch    15"x3  15"x3 nv      Shoulder iso    Abd, extx10         S/l ER     x10 x10 nv      S/l abd (25-90deg)     x10 x10 nv      Prone rows     x10 x15 nv      Prone ext     x10 x10 nv      Prone Habd     2x10 unable to complete Modalities  3/8 3/13    3/20   3/27     CP with TENS shouder  15'  seated Post treatment  declinced  10' 10'

## 2018-04-26 NOTE — LETTER
2018    Dick Mackay, [address] [facility]  [address] [place] [id number]    Patient: Ellis Linares   YOB: 1950   Date of Visit: 2018     Encounter Diagnosis     ICD-10-CM    1  Rotator cuff tendonitis, left M75 82    2  Biceps tendonitis on left M75 22        Dear Dr Gary Molina:    Please review the attached Plan of Care from [facility]  recent visit  Please verify that you agree therapy should continue by signing the attached document and sending it back to our office  If you have any questions or concerns, please don't hesitate to call  Sincerely,    James Blackwood, PT      Referring Provider:      I certify that I have read the below Plan of Care and certify the need for these services furnished under this plan of treatment while under my care  Dick Mackay, [address] [place] [address],[address] [place]          PT Evaluation     Today's date: 2018  Patient name: Ellis Linares  : 1950  MRN: [id number]  Referring provider: Paula Lew MD  Dx:   Encounter Diagnosis     ICD-10-CM    1  Rotator cuff tendonitis, left M75 82    2  Biceps tendonitis on left M75 22                   Assessment  Impairments: abnormal muscle firing, abnormal muscle tone, abnormal or restricted ROM, abnormal movement, activity intolerance, impaired physical strength, lacks appropriate home exercise program, pain with function and weight-bearing intolerance    Assessment details: Pt is a 78 y o  male that presents with decreased strength, increased pain, decreased rom, functional limitations, and symptoms of left shoulder tendonitis and probable bursitis, pt has had MRI to rule out any RTC tears  Pt would benefit from skilled PT to return him to a more functional condition  Understanding of Dx/Px/POC: good   Prognosis: good    Goals  1  Pt will have 0/10 pain at rest in 4 weeks  2  Pt will have <2/10 pain with activity in 6-8 weeks  3   Pt will have full arom of the left UE in 6 weeks  4  Pt will have full prom of the left UE in 3 weeks  5  Pt will be able to sleep on his side with out pain in 8 weeks  6  Pt will be able to lift 5# overhead with out pain in 6 weeks    Plan  Patient would benefit from: skilled PT  Referral necessary: No  Planned modality interventions: TENS, thermotherapy: hydrocollator packs and cryotherapy  Planned therapy interventions: neuromuscular re-education, manual therapy, therapeutic activities, therapeutic exercise and home exercise program  Frequency: 2x week  Duration in weeks: 8  Treatment plan discussed with: patient  Plan details: Poc ends 18        Subjective Evaluation    History of Present Illness  Mechanism of injury: Pt notes that his shoulder is feeling better  He notes that he went to the ortho who ordered an MRI and only found tendonitis, and some bursitis  Pt notes that he continues to have difficulty lifting overhead, as well as sleeping on his sides  Quality of life: good    Pain  Current pain ratin  At best pain ratin  At worst pain ratin  Location: shoulder left  Quality: dull ache, sharp and throbbing  Aggravating factors: lifting and overhead activity  Progression: worsening (slight improvement for wrist, worse for shoulder)    Patient Goals  Patient goals for therapy: decreased pain, increased motion, return to sport/leisure activities, independence with ADLs/IADLs and increased strength          Objective     Postural Observations  Seated posture: poor  Standing posture: poor  Correction of posture: has no consistent effect        Palpation   Left   Tenderness of the biceps, infraspinatus, latissimus, lower trapezius, middle deltoid, pectoralis major, pectoralis minor, posterior deltoid, subscapularis and supraspinatus       Cervical/Thoracic Screen   Cervical range of motion within normal limits  Thoracic range of motion within normal limits with the following exceptions: Limited t/s rot    Neurological Testing     Reflexes   Left   Biceps (C5/C6): normal (2+)  Brachioradialis (C6): normal (2+)  Triceps (C7): normal (2+)    Right   Biceps (C5/C6): normal (2+)  Brachioradialis (C6): normal (2+)  Triceps (C7): normal (2+)    Active Range of Motion   Left Shoulder   Flexion: 135 degrees with pain  External rotation 45°:  45 degrees with pain  Internal rotation 45°:  40 degrees with pain    Right Shoulder   Flexion: 165 degrees   External rotation 45°:  80 degrees   Internal rotation 45°:  60 degrees     Left Wrist   Wrist flexion: 55 degrees with pain  Wrist extension: 65 degrees with pain  Radial deviation: WFL  Ulnar deviation: WFL      Left Thumb   Opposition: Thumb rom wnl but painful with weber abduction and flexion    Joint Play   Left Shoulder  Hypermobile in the anterior capsule, posterior capsule and inferior capsule  Strength/Myotome Testing     Left Shoulder     Planes of Motion   Flexion: 4-   Extension: 4   Abduction: 3+   Adduction: 4   External rotation at 0°:  4-   Internal rotation at 0°:  4+     Right Shoulder     Planes of Motion   Flexion: 4+   Extension: 4+   Abduction: 4+   Adduction: 4+   External rotation at 0°:  4   Internal rotation at 0°:  4+     Left Wrist/Hand      (2nd hand position)     Trial 1: 55    Thumb Strength  Key/Lateral Pinch     Trail 1: 15    Right Wrist/Hand      (2nd hand position)     Trial 1: 85    Thumb Strength   Key/Lateral Pinch     Trial 1: 19    Tests     Left Shoulder   Positive Hawkin's, Neer's and passive horizontal adduction  Negative empty can and painful arc             Precautions: recent left Carpal tunnel release and ganglion cyst excision    Precautions: recent left Carpal tunnel release and ganglion cyst excision  Manuals 3/4/18 3/6 3/8 3/13 3/15 3/19 3/23/18 4/26/18     Shoulder mobs grade 3 post and inf mobs nv nv  np 4' nv 5' 3'     Shoulder prom all planes nv Not kendra  np 5' IR/ER 4' 6' 5'     Left shldr pec infraspinatus and subscap tpr nv 8 8 6' 6' 6' 8' 5'     Left wrist scar mobilization nv 6 8 8' 5' 5' 7'      Ktape for scpa ret and ghjt compression       4'      Exercise Diary     3/13 3/15 3/19       UBE nv nv      Shoulder iso x 6 5''x10 ea     Shoulder rows and ext Blue nv Green  2x10  np         Shoulder Er  Green nv grn  2x10  np         Shoulder IR Dbl green nv Dbl grn  2x10  np         Wrist 4 way Green nv Green  2x10 all  np  Green 2x10 nv      Pro sup (stick) 1# nv 0#  2x10  0#  2x10  0# 2x10 nv      gripper 50# nv 50#  2x10  50#  2x10  50# 2x10 nv      finkelstein stretch    15"x3  15"x3 nv      Shoulder iso    Abd, extx10         S/l ER     x10 x10 nv      S/l abd (25-90deg)     x10 x10 nv      Prone rows     x10 x15 nv      Prone ext     x10 x10 nv      Prone Habd     2x10 unable to complete                                                                                                               Modalities  3/8 3/13    3/20   3/27     CP with TENS shouder  15'  seated Post treatment  declinced  10' 10'

## 2018-05-01 ENCOUNTER — OFFICE VISIT (OUTPATIENT)
Dept: PHYSICAL THERAPY | Facility: CLINIC | Age: 68
End: 2018-05-01
Payer: MEDICARE

## 2018-05-01 DIAGNOSIS — M75.82 ROTATOR CUFF TENDONITIS, LEFT: Primary | ICD-10-CM

## 2018-05-01 DIAGNOSIS — M75.22 BICEPS TENDONITIS ON LEFT: ICD-10-CM

## 2018-05-01 PROCEDURE — 97140 MANUAL THERAPY 1/> REGIONS: CPT

## 2018-05-01 PROCEDURE — 97110 THERAPEUTIC EXERCISES: CPT

## 2018-05-03 ENCOUNTER — OFFICE VISIT (OUTPATIENT)
Dept: PHYSICAL THERAPY | Facility: CLINIC | Age: 68
End: 2018-05-03
Payer: MEDICARE

## 2018-05-03 DIAGNOSIS — M75.22 BICEPS TENDONITIS ON LEFT: ICD-10-CM

## 2018-05-03 DIAGNOSIS — M75.82 ROTATOR CUFF TENDONITIS, LEFT: Primary | ICD-10-CM

## 2018-05-03 PROCEDURE — 97110 THERAPEUTIC EXERCISES: CPT

## 2018-05-03 PROCEDURE — 97014 ELECTRIC STIMULATION THERAPY: CPT

## 2018-05-03 PROCEDURE — 97140 MANUAL THERAPY 1/> REGIONS: CPT

## 2018-05-03 NOTE — PROGRESS NOTES
Daily Note     Today's date: 5/3/2018  Patient name: Rafat Jim  : 1950  MRN: [id number]  Referring provider: Glory Alpers, MD  Dx:   Encounter Diagnosis     ICD-10-CM    1  Rotator cuff tendonitis, left M75 82    2  Biceps tendonitis on left M75 22                   Subjective: no new c/o      Objective: See treatment diary below  Precautions: recent left Carpal tunnel release and ganglion cyst excision  Manuals 3/4/18 3/6 3/8 3/13 3/15 3/19 3/23/18 4/26/18 5/1 5/3   Shoulder mobs grade 3 post and inf mobs nv nv  np 4' nv 5' 3' nv    Shoulder prom all planes nv Not kendra  np 5' IR/ER 4' 6' 5' 8' 10'   Left shldr pec infraspinatus and subscap tpr nv 8 8 6' 6' 6' 8' 5' 5' 5'   Left wrist scar mobilization nv 6 8 8' 5' 5' 7'      Ktape for scpa ret and ghjt compression       4'      Exercise Diary     3/13 3/15 3/19       UBE nv nv      Shoulder iso x 6 5''x10 ea 5"x10 5"x10   Shoulder rows and ext Blue nv Green  2x10  np         Shoulder Er  Green nv grn  2x10  np         Shoulder IR Dbl green nv Dbl grn  2x10  np         Wrist 4 way Green nv Green  2x10 all  np  Green 2x10 nv  Wand ER  5"x10 5"x10   Pro sup (stick) 1# nv 0#  2x10  0#  2x10  0# 2x10 nv  Wand flexion  5"x10 5"x10   gripper 50# nv 50#  2x10  50#  2x10  50# 2x10 nv   [id number]"x10   finkelstein stretch    15"x3  15"x3 nv      Shoulder iso    Abd, extx10         S/l ER     x10 x10 nv      S/l abd (25-90deg)     x10 x10 nv      Prone rows     x10 x15 nv      Prone ext     x10 x10 nv      Prone Habd     2x10 unable to complete                                                                                                               Modalities  3/8 3/13    3/20   3/27     CP with TENS shouder  15'  seated Post treatment  declinced  10' 10'                          Assessment: Tolerated treatment with minimal pain with passive motion   Patient exhibited good technique with therapeutic exercises      Plan: Continue per plan of care     Pt will be on vacation times 2 weeks is scheduled for PT upon return

## 2018-05-22 DIAGNOSIS — K21.9 GASTROESOPHAGEAL REFLUX DISEASE WITHOUT ESOPHAGITIS: ICD-10-CM

## 2018-05-22 RX ORDER — PANTOPRAZOLE SODIUM 40 MG/1
TABLET, DELAYED RELEASE ORAL
Qty: 90 TABLET | OUTPATIENT
Start: 2018-05-22

## 2018-05-24 ENCOUNTER — OFFICE VISIT (OUTPATIENT)
Dept: PHYSICAL THERAPY | Facility: CLINIC | Age: 68
End: 2018-05-24
Payer: MEDICARE

## 2018-05-24 DIAGNOSIS — M75.22 BICEPS TENDONITIS ON LEFT: ICD-10-CM

## 2018-05-24 DIAGNOSIS — M75.82 TENDINITIS OF LEFT ROTATOR CUFF: ICD-10-CM

## 2018-05-24 DIAGNOSIS — M75.82 ROTATOR CUFF TENDONITIS, LEFT: Primary | ICD-10-CM

## 2018-05-24 PROCEDURE — G8991 OTHER PT/OT GOAL STATUS: HCPCS | Performed by: PHYSICAL THERAPIST

## 2018-05-24 PROCEDURE — 97110 THERAPEUTIC EXERCISES: CPT | Performed by: PHYSICAL THERAPIST

## 2018-05-24 PROCEDURE — G8990 OTHER PT/OT CURRENT STATUS: HCPCS | Performed by: PHYSICAL THERAPIST

## 2018-05-24 PROCEDURE — 97140 MANUAL THERAPY 1/> REGIONS: CPT | Performed by: PHYSICAL THERAPIST

## 2018-05-24 NOTE — PROGRESS NOTES
Daily Note     Today's date: 2018  Patient name: Ellis Linares  : 1950  MRN: [id number]  Referring provider: Paula Lew MD  Dx:   Encounter Diagnosis     ICD-10-CM    1  Rotator cuff tendonitis, left M75 82    2  Biceps tendonitis on left M75 22                   Subjective: pt notes that his shoulder is feeling better and that he gets a lot of relief from wall walks      Objective: See treatment diary below  Precautions: recent left Carpal tunnel release and ganglion cyst excision  Manuals 18         5/3   Shoulder mobs grade 3 post and inf mobs 3'            Shoulder prom all planes 10'         10'   Left shldr pec infraspinatus and subscap tpr 4'         5'   Left wrist scar mobilization             Ktape for scpa ret and ghjt compression             Exercise Diary              UBE np         5"x10   Shoulder rows and ext             Shoulder Er              Shoulder IR             Wrist 4 way          5"x10   Pro sup (stick)          5"x10   gripper          [id number]"x10   Supine H abd  bTB 2x10            Supine pnf btb 2x10            S/l ER 2# 2x10            S/l abd 2x10            Prone flex 2x10            Prone ext 2x10            Prone Habd 3x10                                                                                                                    Modalities             CP with TENS shouder                              Assessment: pt with much improved tolerance to prom and mobilizations but with very weak rtc and periscapular musculature      Plan: Continue per plan of care

## 2018-05-31 ENCOUNTER — OFFICE VISIT (OUTPATIENT)
Dept: PHYSICAL THERAPY | Facility: CLINIC | Age: 68
End: 2018-05-31
Payer: MEDICARE

## 2018-05-31 DIAGNOSIS — M75.82 TENDINITIS OF LEFT ROTATOR CUFF: ICD-10-CM

## 2018-05-31 DIAGNOSIS — M75.22 BICEPS TENDONITIS ON LEFT: ICD-10-CM

## 2018-05-31 DIAGNOSIS — M75.82 ROTATOR CUFF TENDONITIS, LEFT: Primary | ICD-10-CM

## 2018-05-31 PROCEDURE — 97140 MANUAL THERAPY 1/> REGIONS: CPT | Performed by: PHYSICAL THERAPIST

## 2018-05-31 PROCEDURE — 97110 THERAPEUTIC EXERCISES: CPT | Performed by: PHYSICAL THERAPIST

## 2018-05-31 NOTE — PROGRESS NOTES
Daily Note     Today's date: 2018  Patient name: Dorothyann Cowden  : 1950  MRN: [id number]  Referring provider: Lemuel Ruiz MD  Dx:   Encounter Diagnosis     ICD-10-CM    1  Rotator cuff tendonitis, left M75 82    2  Biceps tendonitis on left M75 22    3  Tendinitis of left rotator cuff M75 82                   Subjective: pt notes that his left shoulder is sore today from power washing his house as well as moving mulch this weekend      Objective: See treatment diary below    Precautions: recent left Carpal tunnel release and ganglion cyst excision  Manuals 5/24/18 5/31        5/3   Shoulder mobs grade 3 post and inf mobs 3'            Shoulder prom all planes 10'         10'   Left shldr pec infraspinatus and subscap tpr 4'         5'   Left wrist scar mobilization             Ktape for scpa ret and ghjt compression             Exercise Diary              UBE np         5"x10   Shoulder rows and ext             Shoulder Er              Shoulder IR             Wrist 4 way          5"x10   Pro sup (stick)          5"x10   gripper          [id number]"x10   Supine H abd  bTB 2x10 nv           Supine pnf btb 2x10 nv           S/l ER 2# 2x10 2# 2x10           S/l abd 2x10 2x10           Prone flex 2x10 2x10           Prone ext 2x10 2# 2x10           Prone Habd 3x10 Held 2 to pain           Prone row  4# 2x10           Supine flexion  2# 2x10           shld row and ext  Blue 2x10                                                                            Modalities             CP with TENS shouder                            Assessment: pt with increased ttp to the left UT today secondary to increased compensation with powerwashing as well as mulching  Plan: Continue per plan of care

## 2018-06-01 DIAGNOSIS — Z11.59 NEED FOR HEPATITIS C SCREENING TEST: Primary | ICD-10-CM

## 2018-06-05 ENCOUNTER — OFFICE VISIT (OUTPATIENT)
Dept: PHYSICAL THERAPY | Facility: CLINIC | Age: 68
End: 2018-06-05
Payer: MEDICARE

## 2018-06-05 DIAGNOSIS — M75.82 ROTATOR CUFF TENDONITIS, LEFT: Primary | ICD-10-CM

## 2018-06-05 DIAGNOSIS — M75.82 TENDINITIS OF LEFT ROTATOR CUFF: ICD-10-CM

## 2018-06-05 DIAGNOSIS — M75.22 BICEPS TENDONITIS ON LEFT: ICD-10-CM

## 2018-06-05 PROCEDURE — 97140 MANUAL THERAPY 1/> REGIONS: CPT

## 2018-06-05 PROCEDURE — G8991 OTHER PT/OT GOAL STATUS: HCPCS

## 2018-06-05 PROCEDURE — G8990 OTHER PT/OT CURRENT STATUS: HCPCS

## 2018-06-05 PROCEDURE — 97110 THERAPEUTIC EXERCISES: CPT

## 2018-06-05 NOTE — PROGRESS NOTES
Daily Note     Today's date: 2018  Patient name: Karen Rocha  : 1950  MRN: [id number]  Referring provider: Kwaku Louis MD  Dx:   Encounter Diagnosis     ICD-10-CM    1  Rotator cuff tendonitis, left M75 82    2  Biceps tendonitis on left M75 22    3  Tendinitis of left rotator cuff M75 82                   Subjective: able to mulch at home with mild soreness in shoulder with fatigue      Objective: See treatment diary below  Precautions: recent left Carpal tunnel release and ganglion cyst excision  Manuals 5/24/18 5/31 6/5       5/3   Shoulder mobs grade 3 post and inf mobs 3'            Shoulder prom all planes 10'  10'       10'   Left shldr pec infraspinatus and subscap tpr 4'  4'       5'   Left wrist scar mobilization             Ktape for scpa ret and ghjt compression             Exercise Diary              UBE np         5"x10   Shoulder rows and ext             Shoulder Er              Shoulder IR             Wrist 4 way          5"x10   Pro sup (stick)          5"x10   gripper          [id number]"x10   Supine H abd  bTB 2x10 nv Blue  2x10          Supine pnf btb 2x10 nv Blue  2x10          S/l ER 2# 2x10 2# 2x10 2#  2x10          S/l abd 2x10 2x10 2x10          Prone flex 2x10 2x10           Prone ext 2x10 2# 2x10 2#            Prone Habd 3x10 Held 2 to pain           Prone row  4# 2x10 4#  2x10          Supine flexion  2# 2x10 2#  2x10 70*-180*          shld row and ext  Blue 2x10 Blue 2x10                                                                           Modalities             CP with TENS shouder                              Assessment: Tolerated treatment with minimal sxs noted with motions  Patient is making gains with ROM      Plan: Continue per plan of care

## 2018-06-07 ENCOUNTER — OFFICE VISIT (OUTPATIENT)
Dept: PHYSICAL THERAPY | Facility: CLINIC | Age: 68
End: 2018-06-07
Payer: MEDICARE

## 2018-06-07 DIAGNOSIS — M75.82 ROTATOR CUFF TENDONITIS, LEFT: Primary | ICD-10-CM

## 2018-06-07 DIAGNOSIS — M75.22 BICEPS TENDONITIS ON LEFT: ICD-10-CM

## 2018-06-07 DIAGNOSIS — M75.82 TENDINITIS OF LEFT ROTATOR CUFF: ICD-10-CM

## 2018-06-07 PROCEDURE — 97110 THERAPEUTIC EXERCISES: CPT

## 2018-06-07 PROCEDURE — 97140 MANUAL THERAPY 1/> REGIONS: CPT

## 2018-06-07 NOTE — PROGRESS NOTES
Daily Note     Today's date: 2018  Patient name: Aline Last  : 1950  MRN: [id number]  Referring provider: Sheryl Morfin MD  Dx:   Encounter Diagnosis     ICD-10-CM    1  Rotator cuff tendonitis, left M75 82    2  Biceps tendonitis on left M75 22    3  Tendinitis of left rotator cuff M75 82                   Subjective: states that he was shooting target with pistols today and was able to perform with mild discomfort      Objective: See treatment diary below  Precautions: recent left Carpal tunnel release and ganglion cyst excision  Manuals 5/24/18 5/31 6/5 6/7      5/3   Shoulder mobs grade 3 post and inf mobs 3'            Shoulder prom all planes 10'  10' 8      10'   Left shldr pec infraspinatus and subscap tpr 4'  4' 7      5'   Left wrist scar mobilization             Ktape for scpa ret and ghjt compression             Exercise Diary              UBE np         5"x10   Shoulder rows and ext             Shoulder Er              Shoulder IR             Wrist 4 way          5"x10   Pro sup (stick)          5"x10   gripper          [id number]"x10   Supine H abd  bTB 2x10 nv Blue  2x10 Blue  2x10         Supine pnf btb 2x10 nv Blue  2x10 Blue  2x10         S/l ER 2# 2x10 2# 2x10 2#  2x10 2#  2x10         S/l abd 2x10 2x10 2x10 2#  2x10         Prone flex 2x10 2x10  2x10         Prone ext 2x10 2# 2x10 2#   2#  2x10         Prone Habd 3x10 Held 2 to pain           Prone row  4# 2x10 4#  2x10 4#  2x10         Supine flexion  2# 2x10 2#  2x10 70*-180* 2#  2x10           shld row and ext  Blue 2x10 Blue 2x10 Blue  2x10 ea                                                                          Modalities             CP with TENS shouder                                Assessment: Tolerated treatment with increased TTP over bicep   Patient with moderate VC for technique of exercises being performed  Plan: Continue per plan of care

## 2018-06-12 ENCOUNTER — OFFICE VISIT (OUTPATIENT)
Dept: PHYSICAL THERAPY | Facility: CLINIC | Age: 68
End: 2018-06-12
Payer: MEDICARE

## 2018-06-12 DIAGNOSIS — M75.22 BICEPS TENDONITIS ON LEFT: ICD-10-CM

## 2018-06-12 DIAGNOSIS — M75.82 ROTATOR CUFF TENDONITIS, LEFT: Primary | ICD-10-CM

## 2018-06-12 DIAGNOSIS — M75.82 TENDINITIS OF LEFT ROTATOR CUFF: ICD-10-CM

## 2018-06-12 PROCEDURE — 97110 THERAPEUTIC EXERCISES: CPT | Performed by: PHYSICAL THERAPY ASSISTANT

## 2018-06-12 PROCEDURE — 97140 MANUAL THERAPY 1/> REGIONS: CPT | Performed by: PHYSICAL THERAPY ASSISTANT

## 2018-06-12 NOTE — PROGRESS NOTES
Daily Note     Today's date: 2018  Patient name: Tylor Stout  : 1950  MRN: [id number]  Referring provider: Cecille Edwards MD  Dx:   Encounter Diagnosis     ICD-10-CM    1  Rotator cuff tendonitis, left M75 82    2  Biceps tendonitis on left M75 22    3  Tendinitis of left rotator cuff M75 82                   Subjective: pt reports he spread 4 yds of mulch this weekend and is still feeling pretty sore  Objective: See treatment diary below  Precautions: recent left Carpal tunnel release and ganglion cyst excision  Manuals 5/24/18 5/31 6/5 6/7      5/3   Shoulder mobs grade 3 post and inf mobs 3'            Shoulder prom all planes 10'  10' 8      10'   Left shldr pec infraspinatus and subscap tpr 4'  4' 7      5'   Left wrist scar mobilization             Ktape for scpa ret and ghjt compression             Exercise Diary              UBE np         5"x10   Shoulder rows and ext             Shoulder Er              Shoulder IR             Wrist 4 way          5"x10   Pro sup (stick)          5"x10   gripper          [id number]"x10   Supine H abd  bTB 2x10 nv Blue  2x10 Blue  2x10 Blue  2x10        Supine pnf btb 2x10 nv Blue  2x10 Blue  2x10 Blue  2x10        S/l ER 2# 2x10 2# 2x10 2#  2x10 2#  2x10 0#  2x10        S/l abd 2x10 2x10 2x10 2#  2x10 0#  2x10        Prone flex 2x10 2x10  2x10 2x10        Prone ext 2x10 2# 2x10 2#   2#  2x10 2x10        Prone Habd 3x10 Held 2 to pain   2x10        Prone row  4# 2x10 4#  2x10 4#  2x10 2#  2x10        Supine flexion  2# 2x10 2#  2x10 70*-180* 2#  2x10   2x10        shld row and ext  Blue 2x10 Blue 2x10 Blue  2x10 ea Blue  2x10                                                                         Modalities             CP with TENS shouder                                Assessment: Fair tolerance to TE as noted  Held or reduced weights for all TE this session due to increased pain  Resume weights NV as tolerated       Plan: Continue per plan of care

## 2018-06-14 ENCOUNTER — OFFICE VISIT (OUTPATIENT)
Dept: PHYSICAL THERAPY | Facility: CLINIC | Age: 68
End: 2018-06-14
Payer: MEDICARE

## 2018-06-14 DIAGNOSIS — M75.22 BICEPS TENDONITIS ON LEFT: ICD-10-CM

## 2018-06-14 DIAGNOSIS — M75.82 TENDINITIS OF LEFT ROTATOR CUFF: ICD-10-CM

## 2018-06-14 DIAGNOSIS — M75.82 ROTATOR CUFF TENDONITIS, LEFT: Primary | ICD-10-CM

## 2018-06-14 PROCEDURE — 97140 MANUAL THERAPY 1/> REGIONS: CPT | Performed by: PHYSICAL THERAPIST

## 2018-06-14 PROCEDURE — 97110 THERAPEUTIC EXERCISES: CPT | Performed by: PHYSICAL THERAPIST

## 2018-06-14 NOTE — PROGRESS NOTES
Daily Note     Today's date: 2018  Patient name: Jocelyne Judd  : 1950  MRN: [id number]  Referring provider: Zainab Merritt MD  Dx:   Encounter Diagnosis     ICD-10-CM    1  Rotator cuff tendonitis, left M75 82    2  Biceps tendonitis on left M75 22    3   Tendinitis of left rotator cuff M75 82                   Subjective: pt notes that his shoulder is still bothering him      Objective: See treatment diary below    Precautions: recent left Carpal tunnel release and ganglion cyst excision  Manuals 18 618   53   Shoulder mobs grade 3 post and inf mobs 3'      4'      Shoulder prom all planes 10'  10' 8   6'   10'   Left shldr pec infraspinatus and subscap tpr 4'  4' 7   7'   5'   Left wrist scar mobilization             Ktape for scpa ret and ghjt compression             Exercise Diary              UBE np         5"x10   Shoulder rows and ext             Shoulder Er              Shoulder IR             Wrist 4 way          5"x10   Pro sup (stick)          5"x10   gripper          [id number]"x10   Supine H abd  bTB 2x10 nv Blue  2x10 Blue  2x10 Blue  2x10 Blue 2x10 Blue 2x10      Supine pnf btb 2x10 nv Blue  2x10 Blue  2x10 Blue  2x10 Blue 2x10 Blue 2x10      S/l ER 2# 2x10 2# 2x10 2#  2x10 2#  2x10 0#  2x10 0# 2x10 0# 2x10      S/l abd 2x10 2x10 2x10 2#  2x10 0#  2x10 0# 2x10 0# 2x10      Prone flex 2x10 2x10  2x10 2x10  2x10      Prone ext 2x10 2# 2x10 2#   2#  2x10 2x10  2x10      Prone Habd 3x10 Held 2 to pain   2x10  2x10      Prone row  4# 2x10 4#  2x10 4#  2x10 2#  2x10  2x10      Supine flexion  2# 2x10 2#  2x10 70*-180* 2#  2x10   2x10  2x10      shld row and ext  Blue 2x10 Blue 2x10 Blue  2x10 ea Blue  2x10  Blue 2x10                                                                       Modalities             CP with TENS shouder               Assessment: pt advised to reduce amount of yard work (shoveling, and moving mulch) to reduce stress through the left shoulder      Plan: Continue per plan of care

## 2018-06-19 ENCOUNTER — OFFICE VISIT (OUTPATIENT)
Dept: PHYSICAL THERAPY | Facility: CLINIC | Age: 68
End: 2018-06-19
Payer: MEDICARE

## 2018-06-19 DIAGNOSIS — M75.82 ROTATOR CUFF TENDONITIS, LEFT: Primary | ICD-10-CM

## 2018-06-19 DIAGNOSIS — M75.82 TENDINITIS OF LEFT ROTATOR CUFF: ICD-10-CM

## 2018-06-19 DIAGNOSIS — M75.22 BICEPS TENDONITIS ON LEFT: ICD-10-CM

## 2018-06-19 PROCEDURE — 97110 THERAPEUTIC EXERCISES: CPT

## 2018-06-19 PROCEDURE — 97140 MANUAL THERAPY 1/> REGIONS: CPT

## 2018-06-19 NOTE — PROGRESS NOTES
Daily Note     Today's date: 2018  Patient name: Yo Corral  : 1950  MRN: [id number]  Referring provider: Jose Hwang MD  Dx:   Encounter Diagnosis     ICD-10-CM    1  Rotator cuff tendonitis, left M75 82    2  Biceps tendonitis on left M75 22    3   Tendinitis of left rotator cuff M75 82                   Subjective: no new c/o      Objective: See treatment diary below  Precautions: recent left Carpal tunnel release and ganglion cyst excision  Manuals 18 618  5/3   Shoulder mobs grade 3 post and inf mobs 3'      4' np     Shoulder prom all planes 10'  10' 8   6' 6'  10'   Left shldr pec infraspinatus and subscap tpr 4'  4' 7   7' 8'  5'   Left wrist scar mobilization             Ktape for scpa ret and ghjt compression             Exercise Diary              UBE np         5"x10   Shoulder rows and ext             Shoulder Er              Shoulder IR             Wrist 4 way          5"x10   Pro sup (stick)          5"x10   gripper          [id number]"x10   Supine H abd  bTB 2x10 nv Blue  2x10 Blue  2x10 Blue  2x10 Blue 2x10 Blue 2x10 Blue  2x10     Supine pnf btb 2x10 nv Blue  2x10 Blue  2x10 Blue  2x10 Blue 2x10 Blue 2x10 Blue  2x10     S/l ER 2# 2x10 2# 2x10 2#  2x10 2#  2x10 0#  2x10 0# 2x10 0# 2x10 2#  2x10     S/l abd 2x10 2x10 2x10 2#  2x10 0#  2x10 0# 2x10 0# 2x10 2x10     Prone flex 2x10 2x10  2x10 2x10  2x10 2#  2x10     Prone ext 2x10 2# 2x10 2#   2#  2x10 2x10  2x10 2x10     Prone Habd 3x10 Held 2 to pain   2x10  2x10 2x10     Prone row  4# 2x10 4#  2x10 4#  2x10 2#  2x10  2x10 2#  2x10     Supine flexion  2# 2x10 2#  2x10 70*-180* 2#  2x10   2x10  2x10 nv     shld row and ext  Blue 2x10 Blue 2x10 Blue  2x10 ea Blue  2x10  Blue 2x10 Blue  2x10                                                                      Modalities             CP with TENS shouder                 Assessment: Tolerated treatment with ability to resume weight as noted in exercise diary  Patient demonstrated fatigue post treatment      Plan: Continue per plan of care

## 2018-06-21 ENCOUNTER — OFFICE VISIT (OUTPATIENT)
Dept: PHYSICAL THERAPY | Facility: CLINIC | Age: 68
End: 2018-06-21
Payer: MEDICARE

## 2018-06-21 DIAGNOSIS — M75.82 TENDINITIS OF LEFT ROTATOR CUFF: ICD-10-CM

## 2018-06-21 DIAGNOSIS — M75.82 ROTATOR CUFF TENDONITIS, LEFT: Primary | ICD-10-CM

## 2018-06-21 DIAGNOSIS — M75.22 BICEPS TENDONITIS ON LEFT: ICD-10-CM

## 2018-06-21 PROCEDURE — 97110 THERAPEUTIC EXERCISES: CPT

## 2018-06-21 PROCEDURE — 97140 MANUAL THERAPY 1/> REGIONS: CPT

## 2018-06-21 NOTE — PROGRESS NOTES
Daily Note     Today's date: 2018  Patient name: Mumtaz Conn  : 1950  MRN: [id number]  Referring provider: Daljit Hudson MD  Dx:   Encounter Diagnosis     ICD-10-CM    1  Rotator cuff tendonitis, left M75 82    2  Biceps tendonitis on left M75 22    3   Tendinitis of left rotator cuff M75 82                   Subjective: pt notes he still is tight in pec region      Objective: See treatment diary below  Precautions: recent left Carpal tunnel release and ganglion cyst excision  Manuals 18    Shoulder mobs grade 3 post and inf mobs 3'      4' np     Shoulder prom all planes 10'  10' 8   6' 6' 6'    Left shldr pec infraspinatus and subscap tpr 4'  4' 7   7' 8' 6'    Left wrist scar mobilization         passive pec stretch    Ktape for scpa ret and ghjt compression             Exercise Diary              UBE np            Shoulder rows and ext             Shoulder Er              Shoulder IR             Wrist 4 way             Pro sup (stick)             gripper             Supine H abd  bTB 2x10 nv Blue  2x10 Blue  2x10 Blue  2x10 Blue 2x10 Blue 2x10 Blue  2x10 Blue  2x10    Supine pnf btb 2x10 nv Blue  2x10 Blue  2x10 Blue  2x10 Blue 2x10 Blue 2x10 Blue  2x10 Blue  2x10    S/l ER 2# 2x10 2# 2x10 2#  2x10 2#  2x10 0#  2x10 0# 2x10 0# 2x10 2#  2x10 2#  2x10    S/l abd 2x10 2x10 2x10 2#  2x10 0#  2x10 0# 2x10 0# 2x10 2x10 2x10    Prone flex 2x10 2x10  2x10 2x10  2x10 2#  2x10 2x10    Prone ext 2x10 2# 2x10 2#   2#  2x10 2x10  2x10 2x10 2x10    Prone Habd 3x10 Held 2 to pain   2x10  2x10 2x10 2x10    Prone row  4# 2x10 4#  2x10 4#  2x10 2#  2x10  2x10 2#  2x10 33  2x10    Supine flexion  2# 2x10 2#  2x10 70*-180* 2#  2x10   2x10  2x10 nv 2x10    shld row and ext  Blue 2x10 Blue 2x10 Blue  2x10 ea Blue  2x10  Blue 2x10 Blue  2x10 Blue  2x10                                                                     Modalities             CP with TENS shouder Assessment:   Added pec stretch in supine with arm straight and slightly supported by therapist      Plan: Continue per plan of care

## 2018-06-26 ENCOUNTER — EVALUATION (OUTPATIENT)
Dept: PHYSICAL THERAPY | Facility: CLINIC | Age: 68
End: 2018-06-26
Payer: MEDICARE

## 2018-06-26 DIAGNOSIS — M75.22 BICEPS TENDONITIS ON LEFT: ICD-10-CM

## 2018-06-26 DIAGNOSIS — M75.82 TENDINITIS OF LEFT ROTATOR CUFF: ICD-10-CM

## 2018-06-26 DIAGNOSIS — M75.82 ROTATOR CUFF TENDONITIS, LEFT: Primary | ICD-10-CM

## 2018-06-26 PROCEDURE — 97140 MANUAL THERAPY 1/> REGIONS: CPT | Performed by: PHYSICAL THERAPIST

## 2018-06-26 PROCEDURE — 97110 THERAPEUTIC EXERCISES: CPT | Performed by: PHYSICAL THERAPIST

## 2018-06-26 NOTE — PROGRESS NOTES
PT REevaluation    Today's date: 2018  Patient name: Sol Olivarez  : 1950  MRN: [id number]  Referring provider: Bernardo Javed MD  Dx:   Encounter Diagnosis     ICD-10-CM    1  Rotator cuff tendonitis, left M75 82    2  Biceps tendonitis on left M75 22    3  Tendinitis of left rotator cuff M75 82                   Assessment  Impairments: abnormal muscle firing, abnormal muscle tone, abnormal or restricted ROM, abnormal movement, activity intolerance, impaired physical strength, lacks appropriate home exercise program, pain with function and weight-bearing intolerance    Assessment details: Pt is a 78 y o  male that presents with decreased strength, increased pain, decreased rom, functional limitations, and symptoms of left shoulder tendonitis and probable bursitis  Pt would benefit from skilled PT to return him to a more functional condition  Understanding of Dx/Px/POC: good   Prognosis: good    Goals  1  Pt will have 0/10 pain at rest in 4 weeks met  2  Pt will have <2/10 pain with activity in 6-8 weeks not met  3  Pt will have full arom of the left UE in 6 weeks met  4  Pt will have full prom of the left UE in 3 weeks met  5  Pt will be able to sleep on his side with out pain in 8 weeks not met  6  Pt will be able to lift 5# overhead with out pain in 6 weeks partially met    Plan  Patient would benefit from: skilled PT  Referral necessary: No  Planned modality interventions: TENS, thermotherapy: hydrocollator packs and cryotherapy  Planned therapy interventions: neuromuscular re-education, manual therapy, therapeutic activities, therapeutic exercise and home exercise program  Frequency: 2x week  Duration in weeks: 4  Plan of Care beginning date: 2018  Plan of Care expiration date: 2018  Treatment plan discussed with: patient        Subjective Evaluation    History of Present Illness  Mechanism of injury: Pt notes that his shoulder does feel better   He notes that he does not want surgery or another shot  He notes that he still has pain with sleeping as well as with swimming any stroke more than 100 meters  Pt notes that his strength has improved, he also notes less frequent and intensity of pain  Quality of life: good    Pain  Current pain ratin  At best pain ratin  At worst pain ratin  Location: shoulder left  Quality: dull ache, sharp and throbbing  Aggravating factors: lifting and overhead activity  Progression: worsening (slight improvement for wrist, worse for shoulder)    Patient Goals  Patient goals for therapy: decreased pain, increased motion, return to sport/leisure activities, independence with ADLs/IADLs and increased strength          Objective     Postural Observations  Seated posture: fair  Standing posture: fair  Correction of posture: has no consistent effect        Palpation   Left   Tenderness of the biceps, infraspinatus, latissimus, lower trapezius, middle deltoid, pectoralis major, pectoralis minor, posterior deltoid, subscapularis and supraspinatus       Cervical/Thoracic Screen   Cervical range of motion within normal limits  Thoracic range of motion within normal limits with the following exceptions: Limited t/s rot    Neurological Testing     Reflexes   Left   Biceps (C5/C6): normal (2+)  Brachioradialis (C6): normal (2+)  Triceps (C7): normal (2+)    Right   Biceps (C5/C6): normal (2+)  Brachioradialis (C6): normal (2+)  Triceps (C7): normal (2+)    Active Range of Motion   Left Shoulder   Flexion: 155 degrees with pain  External rotation 45°: 45 degrees with pain  Internal rotation 45°: 40 degrees with pain    Right Shoulder   Flexion: 155 degrees   External rotation 45°: 80 degrees   Internal rotation 45°: 60 degrees     Left Wrist   Wrist flexion: 55 degrees with pain  Wrist extension: 65 degrees with pain  Radial deviation: WFL  Ulnar deviation: WFL      Left Thumb   Opposition: Thumb rom wnl but painful with weber abduction and flexion    Joint Play   Left Shoulder  Hypermobile in the anterior capsule and posterior capsule  Strength/Myotome Testing     Left Shoulder     Planes of Motion   Flexion: 4+   Extension: 4+   Abduction: 4 (reproduces cc)   Adduction: 5   External rotation at 0°: 4 (reproduces cc)   Internal rotation at 0°: 5     Right Shoulder     Planes of Motion   Flexion: 4+   Extension: 4+   Abduction: 4+   Adduction: 4+   External rotation at 0°: 4   Internal rotation at 0°: 4+     Left Wrist/Hand      (2nd hand position)     Trial 1: 55    Thumb Strength  Key/Lateral Pinch     Trail 1: 15    Right Wrist/Hand      (2nd hand position)     Trial 1: 85    Thumb Strength   Key/Lateral Pinch     Trial 1: 19    Tests     Left Shoulder   Positive Neer's  Negative empty can, Hawkin's, painful arc and passive horizontal adduction             Precautions: recent left Carpal tunnel release and ganglion cyst excision  Manuals 5/24/18 5/31 6/5 6/7 6/14/18 6/19 6/21 6/26   Shoulder mobs grade 3 post and inf mobs 3'      4' np     Shoulder prom all planes 10'  10' 8   6' 6' 6' 6'   Left shldr pec infraspinatus and subscap tpr 4'  4' 7   7' 8' 6' 6'   Left wrist scar mobilization         passive pec stretch    Ktape for scpa ret and ghjt compression             Exercise Diary              UBE np            Shoulder rows and ext             Shoulder Er              Shoulder IR             Wrist 4 way             Pro sup (stick)             gripper             Supine H abd  bTB 2x10 nv Blue  2x10 Blue  2x10 Blue  2x10 Blue 2x10 Blue 2x10 Blue  2x10 Blue  2x10 Blue 3x10   Supine pnf btb 2x10 nv Blue  2x10 Blue  2x10 Blue  2x10 Blue 2x10 Blue 2x10 Blue  2x10 Blue  2x10 Blue 3x10   S/l ER 2# 2x10 2# 2x10 2#  2x10 2#  2x10 0#  2x10 0# 2x10 0# 2x10 2#  2x10 2#  2x10 0# x10 1# x10   S/l abd 2x10 2x10 2x10 2#  2x10 0#  2x10 0# 2x10 0# 2x10 2x10 2x10 nv   Prone flex 2x10 2x10  2x10 2x10  2x10 2#  2x10 2x10 nv   Prone ext 2x10 2# 2x10 2#   2#  2x10 2x10  2x10 2x10 2x10 nv   Prone Habd 3x10 Held 2 to pain   2x10  2x10 2x10 2x10 nv   Prone row  4# 2x10 4#  2x10 4#  2x10 2#  2x10  2x10 2#  2x10 33  2x10 nv   Supine flexion  2# 2x10 2#  2x10 70*-180* 2#  2x10   2x10  2x10 nv 2x10    shld row and ext  Blue 2x10 Blue 2x10 Blue  2x10 ea Blue  2x10  Blue 2x10 Blue  2x10 Blue  2x10                                                                       Modalities             CP with TENS aneeshuder

## 2018-06-28 ENCOUNTER — OFFICE VISIT (OUTPATIENT)
Dept: PHYSICAL THERAPY | Facility: CLINIC | Age: 68
End: 2018-06-28
Payer: MEDICARE

## 2018-06-28 DIAGNOSIS — M75.82 ROTATOR CUFF TENDONITIS, LEFT: Primary | ICD-10-CM

## 2018-06-28 DIAGNOSIS — M75.82 TENDINITIS OF LEFT ROTATOR CUFF: ICD-10-CM

## 2018-06-28 DIAGNOSIS — M75.22 BICEPS TENDONITIS ON LEFT: ICD-10-CM

## 2018-06-28 PROCEDURE — 97140 MANUAL THERAPY 1/> REGIONS: CPT

## 2018-06-28 PROCEDURE — 97110 THERAPEUTIC EXERCISES: CPT

## 2018-06-28 NOTE — PROGRESS NOTES
Daily Note     Today's date: 2018  Patient name: Jairo Oneal  : 1950  MRN: [id number]  Referring provider: Keaton Loco MD  Dx:   Encounter Diagnosis     ICD-10-CM    1  Rotator cuff tendonitis, left M75 82    2  Tendinitis of left rotator cuff M75 82    3  Biceps tendonitis on left M75 22                   Subjective: Patient reports his shoulder is doing OK  He was able to do his exercises and cut the grass with no difficulty this morning  Objective: See treatment diary below      Assessment: Tolerated treatment well  Patient exhibited good technique with therapeutic exercises  No difficulties with any TE this visit  Able to add 1# to most prone exercises  Plan: Continue per plan of care       Precautions: recent left Carpal tunnel release and ganglion cyst excision  Manuals 18   Shoulder mobs grade 3 post and inf mobs           4' np        Shoulder prom all planes   10' 8     6' 6' 6' 6' 6'   Left shldr pec infraspinatus and subscap tpr   4' 7     7' 8' 6' 6' 6'   Left wrist scar mobilization               passive pec stretch   Passive pec stretch   Ktape for scpa ret and ghjt compression                      Exercise Diary                       UBE                      Shoulder rows and ext                      Shoulder Er                       Shoulder IR                      Wrist 4 way                      Pro sup (stick)                      gripper                      Supine H abd  nv Blue  2x10 Blue  2x10 Blue  2x10 Blue 2x10 Blue 2x10 Blue  2x10 Blue  2x10 Blue 3x10 Blue 3x10   Supine pnf nv Blue  2x10 Blue  2x10 Blue  2x10 Blue 2x10 Blue 2x10 Blue  2x10 Blue  2x10 Blue 3x10 Blue 3x10   S/l ER 2# 2x10 2#  2x10 2#  2x10 0#  2x10 0# 2x10 0# 2x10 2#  2x10 2#  2x10 0# x10 1# x10 1# 2x10   S/l abd 2x10 2x10 2#  2x10 0#  2x10 0# 2x10 0# 2x10 2x10 2x10 nv 1# 2x10   Prone flex 2x10   2x10 2x10   2x10 2#  2x10 2x10 nv 1# 2x10   Prone ext 2# 2x10 2#    2#  2x10 2x10   2x10 2x10 2x10 nv 1# 2x10   Prone Habd Held 2 to pain     2x10   2x10 2x10 2x10 nv 2x10   Prone row 4# 2x10 4#  2x10 4#  2x10 2#  2x10   2x10 2#  2x10 33  2x10 nv 3# 2x10   Supine flexion 2# 2x10 2#  2x10 70*-180* 2#  2x10   2x10   2x10 nv 2x10   2x10   shld row and ext Blue 2x10 Blue 2x10 Blue  2x10 ea Blue  2x10   Blue 2x10 Blue  2x10 Blue  2x10      Blue 2x10                                                                                                                      Modalities                      CP with TENS aneeshuder

## 2018-07-05 ENCOUNTER — OFFICE VISIT (OUTPATIENT)
Dept: PHYSICAL THERAPY | Facility: CLINIC | Age: 68
End: 2018-07-05
Payer: MEDICARE

## 2018-07-05 DIAGNOSIS — M75.22 BICEPS TENDONITIS ON LEFT: ICD-10-CM

## 2018-07-05 DIAGNOSIS — M75.82 ROTATOR CUFF TENDONITIS, LEFT: Primary | ICD-10-CM

## 2018-07-05 DIAGNOSIS — M75.82 TENDINITIS OF LEFT ROTATOR CUFF: ICD-10-CM

## 2018-07-05 PROCEDURE — 97110 THERAPEUTIC EXERCISES: CPT

## 2018-07-05 NOTE — PROGRESS NOTES
Daily Note     Today's date: 2018  Patient name: Jocelyne Judd  : 1950  MRN: [id number]  Referring provider: Zainab Merritt MD  Dx:   Encounter Diagnosis     ICD-10-CM    1  Rotator cuff tendonitis, left M75 82    2  Tendinitis of left rotator cuff M75 82    3  Biceps tendonitis on left M75 22                   Subjective: pt reports that he continues to have pain in shoulder and that it causes sharp pain at about 70-80* then stops      Objective: See treatment diary below  Precautions: recent left Carpal tunnel release and ganglion cyst excision  Manuals    Shoulder mobs grade 3 post and inf mobs             Shoulder prom all planes 5'         6'   Left shldr pec infraspinatus and subscap tpr 8'         6'   Passive pec  stretch          Passive pec stretch                         Exercise Diary                       UBE                                            Supine H abd  Blue  3x10         Blue 3x10   Supine pnf #blue  3x10         Blue 3x10   S/l ER 1#  2x10         1# 2x10   S/l abd np         1# 2x10   Prone flex 3#  2x10         1# 2x10   Prone ext 2x10*         1# 2x10   Prone Habd 2x10         2x10   Prone row 3#  3x10         3# 2x10   Supine flexion np         2x10   shld row and ext Blue  2x10 ea             Blue 2x10                                                                                                                      Modalities                      CP with TENS shouder                              Assessment: Tolerated treatment with increased pain and pop with prone ext, sidelying abd and supine flexion today  Notes this pain is sharp and goes away out of 70-90* range of motion  Patient would benefit from continued PT      Plan: Continue per plan of care

## 2018-07-10 ENCOUNTER — OFFICE VISIT (OUTPATIENT)
Dept: PHYSICAL THERAPY | Facility: CLINIC | Age: 68
End: 2018-07-10
Payer: MEDICARE

## 2018-07-10 DIAGNOSIS — M75.22 BICEPS TENDONITIS ON LEFT: ICD-10-CM

## 2018-07-10 DIAGNOSIS — M75.82 ROTATOR CUFF TENDONITIS, LEFT: Primary | ICD-10-CM

## 2018-07-10 DIAGNOSIS — M75.82 TENDINITIS OF LEFT ROTATOR CUFF: ICD-10-CM

## 2018-07-10 PROCEDURE — 97110 THERAPEUTIC EXERCISES: CPT | Performed by: PHYSICAL THERAPIST

## 2018-07-10 NOTE — PROGRESS NOTES
Daily Note     Today's date: 7/10/2018  Patient name: Yoon Sewell  : 1950  MRN: [id number]  Referring provider: Juan Jose Lu MD  Dx:   Encounter Diagnosis     ICD-10-CM    1  Rotator cuff tendonitis, left M75 82    2  Tendinitis of left rotator cuff M75 82    3  Biceps tendonitis on left M75 22                   Subjective: pt notes that he is feeling good today  Objective: See treatment diary below  Precautions: recent left Carpal tunnel release and ganglion cyst excision  Manuals 7/5 7/10           Shoulder mobs grade 3 post and inf mobs             Shoulder prom all planes 5' 5'           Left shldr pec infraspinatus and subscap tpr 8' 8'           Passive pec  stretch                                   Exercise Diary                       UBE                                            Supine H abd  Blue  3x10 Blue  3x10           Supine pnf #blue  3x10 Blue  3x10           S/l ER 1#  2x10 1#  2x10           S/l abd np            Prone flex 3#  2x10 3#  2x10           Prone ext 2x10*   2x10           Prone Habd 2x10 2x10           Prone row 3#  3x10 3#  2x10           Supine flexion np            shld row and ext Blue  2x10 ea Blue  2x10                                                               Assessment: pt with improved rom, but continues with pain, possibly due to continue poor activation of the lt and Mt    Plan: Continue per plan of care

## 2018-07-12 ENCOUNTER — APPOINTMENT (OUTPATIENT)
Dept: PHYSICAL THERAPY | Facility: CLINIC | Age: 68
End: 2018-07-12
Payer: MEDICARE

## 2018-07-16 DIAGNOSIS — E78.5 HYPERLIPIDEMIA, UNSPECIFIED HYPERLIPIDEMIA TYPE: ICD-10-CM

## 2018-07-16 RX ORDER — SIMVASTATIN 40 MG
TABLET ORAL
Qty: 90 TABLET | OUTPATIENT
Start: 2018-07-16

## 2018-07-17 ENCOUNTER — OFFICE VISIT (OUTPATIENT)
Dept: PHYSICAL THERAPY | Facility: CLINIC | Age: 68
End: 2018-07-17
Payer: MEDICARE

## 2018-07-17 DIAGNOSIS — M75.82 TENDINITIS OF LEFT ROTATOR CUFF: ICD-10-CM

## 2018-07-17 DIAGNOSIS — M75.82 ROTATOR CUFF TENDONITIS, LEFT: Primary | ICD-10-CM

## 2018-07-17 DIAGNOSIS — M75.22 BICEPS TENDONITIS ON LEFT: ICD-10-CM

## 2018-07-17 PROCEDURE — 97140 MANUAL THERAPY 1/> REGIONS: CPT

## 2018-07-17 PROCEDURE — 97110 THERAPEUTIC EXERCISES: CPT

## 2018-07-17 NOTE — PROGRESS NOTES
Daily Note     Today's date: 2018  Patient name: Karen Rocha  : 1950  MRN: [id number]  Referring provider: Kwaku Louis MD  Dx:   Encounter Diagnosis     ICD-10-CM    1  Rotator cuff tendonitis, left M75 82    2  Tendinitis of left rotator cuff M75 82    3  Biceps tendonitis on left M75 22                   Subjective: tightened up       Objective: See treatment diary below  Precautions: recent left Carpal tunnel release and ganglion cyst excision  Manuals 7/5 7/10 7/17          Shoulder mobs grade 3 post and inf mobs             Shoulder prom all planes 5' 5' 5'          Left shldr pec infraspinatus and subscap tpr 8' 8' 8'          Passive pec  stretch                                   Exercise Diary                       UBE                                            Supine H abd  Blue  3x10 Blue  3x10 Black  2x10          Supine pnf #blue  3x10 Blue  3x10 Black  2x10          S/l ER 1#  2x10 1#  2x10 13  2x10          S/l abd np            Prone flex 3#  2x10 3#  2x10 3#  2x10          Prone ext 2x10*   2x10 2#  2x10          Prone Habd 2x10 2x10 2x10          Prone row 3#  3x10 3#  2x10 5#  2x10          Supine flexion np            shld row and ext Blue  2x10 ea Blue  2x10 Blue  3x10                                                                Assessment: Tolerated treatment with ability to increase weight for prone exercises with no pain    Patient demonstrated fatigue post treatment less tightens post treatment session      Plan: Continue per plan of care

## 2018-07-19 ENCOUNTER — OFFICE VISIT (OUTPATIENT)
Dept: PHYSICAL THERAPY | Facility: CLINIC | Age: 68
End: 2018-07-19
Payer: MEDICARE

## 2018-07-19 DIAGNOSIS — M75.22 BICEPS TENDONITIS ON LEFT: ICD-10-CM

## 2018-07-19 DIAGNOSIS — M75.82 TENDINITIS OF LEFT ROTATOR CUFF: ICD-10-CM

## 2018-07-19 DIAGNOSIS — M75.82 ROTATOR CUFF TENDONITIS, LEFT: Primary | ICD-10-CM

## 2018-07-19 PROCEDURE — 97140 MANUAL THERAPY 1/> REGIONS: CPT | Performed by: PHYSICAL THERAPIST

## 2018-07-19 PROCEDURE — G8990 OTHER PT/OT CURRENT STATUS: HCPCS | Performed by: PHYSICAL THERAPIST

## 2018-07-19 PROCEDURE — G8991 OTHER PT/OT GOAL STATUS: HCPCS | Performed by: PHYSICAL THERAPIST

## 2018-07-19 PROCEDURE — 97110 THERAPEUTIC EXERCISES: CPT | Performed by: PHYSICAL THERAPIST

## 2018-07-19 NOTE — PROGRESS NOTES
Daily Note     Today's date: 2018  Patient name: Jose Veloz  : 1950  MRN: [id number]  Referring provider: Rylee Rausch MD  Dx:   Encounter Diagnosis     ICD-10-CM    1  Rotator cuff tendonitis, left M75 82    2  Tendinitis of left rotator cuff M75 82    3  Biceps tendonitis on left M75 22                   Subjective: pt notes that his shoulder was feeling good and loose up until he ran the vacuum and steamed the carpets yesterday      Objective: See treatment diary below    Precautions: recent left Carpal tunnel release and ganglion cyst excision  Manuals 7/5 7/10 7/17 7/19         Shoulder mobs grade 3 post and inf mobs             Shoulder prom all planes 5' 5' 5' 5'         Left shldr pec infraspinatus and subscap tpr 8' 8' 8' 8'         Passive pec  stretch                                   Exercise Diary                       UBE                                            Supine H abd  Blue  3x10 Blue  3x10 Black  2x10 Black 2x10         Supine pnf #blue  3x10 Blue  3x10 Black  2x10  black 2x10         S/l ER 1#  2x10 1#  2x10 1#  2x10 1# 2x10         S/l abd np            Prone flex 3#  2x10 3#  2x10 3#  2x10 5# 2x10         Prone ext 2x10*   2x10 2#  2x10 2# 2x10         Prone Habd 2x10 2x10 2x10 2# 2x10         Prone row 3#  3x10 3#  2x10 5#  2x10 5# 2x10         Supine flexion np            shld row and ext Blue  2x10 ea Blue  2x10 Blue  3x10 Blue 3x10                                                             Assessment: pt continues to have weakness with external rotation strength       Plan: add standing IR and Er as well as standing wall lifts (in unilateral Y position)

## 2018-07-24 ENCOUNTER — OFFICE VISIT (OUTPATIENT)
Dept: PHYSICAL THERAPY | Facility: CLINIC | Age: 68
End: 2018-07-24
Payer: MEDICARE

## 2018-07-24 DIAGNOSIS — M75.82 ROTATOR CUFF TENDONITIS, LEFT: Primary | ICD-10-CM

## 2018-07-24 DIAGNOSIS — M75.82 TENDINITIS OF LEFT ROTATOR CUFF: ICD-10-CM

## 2018-07-24 DIAGNOSIS — M75.22 BICEPS TENDONITIS ON LEFT: ICD-10-CM

## 2018-07-24 PROCEDURE — 97140 MANUAL THERAPY 1/> REGIONS: CPT

## 2018-07-24 PROCEDURE — 97110 THERAPEUTIC EXERCISES: CPT

## 2018-07-24 NOTE — PROGRESS NOTES
Daily Note     Today's date: 2018  Patient name: Yanick Martinez  : 1950  MRN: [id number]  Referring provider: Vasile Cuenca MD  Dx:   Encounter Diagnosis     ICD-10-CM    1  Rotator cuff tendonitis, left M75 82    2  Tendinitis of left rotator cuff M75 82    3  Biceps tendonitis on left M75 22                   Subjective: feels loose today      Objective: See treatment diary below  Precautions: recent left Carpal tunnel release and ganglion cyst excision  Manuals 7/5 7/10 7/17 7/19 7/24        Shoulder mobs grade 3 post and inf mobs             Shoulder prom all planes 5' 5' 5' 5' 4'        Left shldr pec infraspinatus and subscap tpr 8' 8' 8' 8' 8'        Passive pec  stretch                                   Exercise Diary                       UBE                                            Supine H abd  Blue  3x10 Blue  3x10 Black  2x10 Black 2x10 Black  2x10        Supine pnf #blue  3x10 Blue  3x10 Black  2x10  black 2x10 Black  2x10        S/l ER 1#  2x10 1#  2x10 1#  2x10 1# 2x10 1#  2x10        S/l abd np            Prone flex 3#  2x10 3#  2x10 3#  2x10 5# 2x10 5#  2x10        Prone ext 2x10*   2x10 2#  2x10 2# 2x10 5#  2x10        Prone Habd 2x10 2x10 2x10 2# 2x10 2#  2x10        Prone row 3#  3x10 3#  2x10 5#  2x10 5# 2x10 5#  2x10        Supine flexion np            shld row and ext Blue  2x10 ea Blue  2x10 Blue  3x10 Blue 3x10 Black  2x10             theraband IR         Dbl green  2x10            Theraband ER     singl green  2x10        Lift off standing          2x10                Assessment: Tolerated treatment with noted discomfort with extension    Patient remains with TTP over subscap       Plan: Progress note during next visit

## 2018-07-26 ENCOUNTER — EVALUATION (OUTPATIENT)
Dept: PHYSICAL THERAPY | Facility: CLINIC | Age: 68
End: 2018-07-26
Payer: MEDICARE

## 2018-07-26 DIAGNOSIS — M75.22 BICEPS TENDONITIS ON LEFT: ICD-10-CM

## 2018-07-26 DIAGNOSIS — M75.82 TENDINITIS OF LEFT ROTATOR CUFF: ICD-10-CM

## 2018-07-26 DIAGNOSIS — M75.82 ROTATOR CUFF TENDONITIS, LEFT: Primary | ICD-10-CM

## 2018-07-26 PROCEDURE — G8990 OTHER PT/OT CURRENT STATUS: HCPCS | Performed by: PHYSICAL THERAPIST

## 2018-07-26 PROCEDURE — 97110 THERAPEUTIC EXERCISES: CPT | Performed by: PHYSICAL THERAPIST

## 2018-07-26 PROCEDURE — 97140 MANUAL THERAPY 1/> REGIONS: CPT | Performed by: PHYSICAL THERAPIST

## 2018-07-26 PROCEDURE — G8991 OTHER PT/OT GOAL STATUS: HCPCS | Performed by: PHYSICAL THERAPIST

## 2018-07-26 NOTE — PROGRESS NOTES
PT REevaluation    Today's date: 2018  Patient name: Jocelyne Judd  : 1950  MRN: [id number]  Referring provider: Zainab Merritt MD  Dx:   Encounter Diagnosis     ICD-10-CM    1  Rotator cuff tendonitis, left M75 82    2  Tendinitis of left rotator cuff M75 82    3  Biceps tendonitis on left M75 22                   Assessment  Impairments: abnormal muscle firing, abnormal muscle tone, abnormal or restricted ROM, abnormal movement, activity intolerance, impaired physical strength, lacks appropriate home exercise program, pain with function and weight-bearing intolerance    Assessment details: Jocelyne Judd has been compliant with attending PT and home exercise program since initial eval   Modesta King  has made improvements in objective data since initial eval but is still limited compared to prior level of function  Modesta King continues with above listed impairments and would benefit from additional skilled PT but due to insurance limitations he will be transferred to an [facility] over the next week  Understanding of Dx/Px/POC: good   Prognosis: good    Goals  1  Pt will have 0/10 pain at rest in 4 weeks met  2  Pt will have <2/10 pain with activity in 6-8 weeks not met  3  Pt will have full arom of the left UE in 6 weeks met  4  Pt will have full prom of the left UE in 3 weeks met  5  Pt will be able to sleep on his side with out pain in 8 weeks not met  6   Pt will be able to lift 5# overhead with out pain in 6 weeks partially met    Plan  Patient would benefit from: skilled PT  Referral necessary: No  Planned modality interventions: TENS, thermotherapy: hydrocollator packs and cryotherapy  Planned therapy interventions: neuromuscular re-education, manual therapy, therapeutic activities, therapeutic exercise and home exercise program  Frequency: 2x week  Duration in weeks: 4  Plan of Care beginning date: 2018  Plan of Care expiration date: 2018  Treatment plan discussed with: patient        Subjective Evaluation    History of Present Illness  Mechanism of injury: Pt notes that he has been doing much better, but still wakes up with more stiffness then pain, but this reduces as he uses his shoulder  He has not gone back to swimming yet  Quality of life: good    Pain  Current pain ratin  At best pain ratin  At worst pain ratin  Location: shoulder left  Quality: dull ache, sharp and throbbing  Aggravating factors: lifting and overhead activity  Progression: worsening (slight improvement for wrist, worse for shoulder)    Patient Goals  Patient goals for therapy: decreased pain, increased motion, return to sport/leisure activities, independence with ADLs/IADLs and increased strength          Objective     Postural Observations  Seated posture: fair  Standing posture: fair  Correction of posture: has no consistent effect        Palpation   Left   Tenderness of the biceps, middle deltoid, pectoralis major, pectoralis minor and subscapularis  Cervical/Thoracic Screen   Cervical range of motion within normal limits  Thoracic range of motion within normal limits with the following exceptions: Limited t/s rot    Neurological Testing     Reflexes   Left   Biceps (C5/C6): normal (2+)  Brachioradialis (C6): normal (2+)  Triceps (C7): normal (2+)    Right   Biceps (C5/C6): normal (2+)  Brachioradialis (C6): normal (2+)  Triceps (C7): normal (2+)    Active Range of Motion   Left Shoulder   Flexion: 155 degrees   External rotation 45°: 80 degrees   Internal rotation 45°: 50 degrees with pain    Right Shoulder   Flexion: 155 degrees   External rotation 45°: 80 degrees   Internal rotation 45°: 60 degrees     Left Wrist   Wrist flexion: 55 degrees with pain  Wrist extension: 65 degrees with pain  Radial deviation: WFL  Ulnar deviation: WFL      Left Thumb   Opposition: Thumb rom wnl but painful with weber abduction and flexion    Joint Play   Left Shoulder  Hypermobile in the posterior capsule       Strength/Myotome Testing     Left Shoulder     Planes of Motion   Flexion: 4+   Extension: 4+   Abduction: 4+ (reproduces cc)   Adduction: 5   External rotation at 0°: 4 (reproduces cc)   Internal rotation at 0°: 5     Right Shoulder     Planes of Motion   Flexion: 4+   Extension: 4+   Abduction: 4+   Adduction: 4+   External rotation at 0°: 4   Internal rotation at 0°: 4+     Left Wrist/Hand      (2nd hand position)     Trial 1: 55    Thumb Strength  Key/Lateral Pinch     Trail 1: 15    Right Wrist/Hand      (2nd hand position)     Trial 1: 85    Thumb Strength   Key/Lateral Pinch     Trial 1: 19    Tests     Left Shoulder   Negative empty can, Hawkin's, Neer's, painful arc and passive horizontal adduction             Precautions: recent left Carpal tunnel release and ganglion cyst excision  Manuals 7/5 7/10 7/17 7/19 7/24 7/26       Shoulder mobs grade 3 post and inf mobs             Shoulder prom all planes 5' 5' 5' 5' 4' 4'       Left shldr pec infraspinatus and subscap tpr 8' 8' 8' 8' 8' 8'       Passive pec  stretch                                   Exercise Diary                       UBE                                            Supine H abd  Blue  3x10 Blue  3x10 Black  2x10 Black 2x10 Black  2x10 Black 2x10       Supine pnf #blue  3x10 Blue  3x10 Black  2x10  black 2x10 Black  2x10 Black 2x10       S/l ER 1#  2x10 1#  2x10 1#  2x10 1# 2x10 1#  2x10 1# 2x10       S/l abd np            Prone flex 3#  2x10 3#  2x10 3#  2x10 5# 2x10 5#  2x10 5# 2x10       Prone ext 2x10*   2x10 2#  2x10 2# 2x10 5#  2x10 5# 2x10       Prone Habd 2x10 2x10 2x10 2# 2x10 2#  2x10 2# 2x10       Prone row 3#  3x10 3#  2x10 5#  2x10 5# 2x10 5#  2x10 5# 2x10       Supine flexion np            shld row and ext Blue  2x10 ea Blue  2x10 Blue  3x10 Blue 3x10 Black  2x10 Black 2x10            theraband IR         Dbl green  2x10  dbl green 2x10          Theraband ER     singl green  2x10 sngl green 2x10       Lift off standing          2x10  2x10

## 2018-07-31 ENCOUNTER — OFFICE VISIT (OUTPATIENT)
Dept: PHYSICAL THERAPY | Facility: CLINIC | Age: 68
End: 2018-07-31
Payer: MEDICARE

## 2018-07-31 DIAGNOSIS — M75.82 ROTATOR CUFF TENDONITIS, LEFT: Primary | ICD-10-CM

## 2018-07-31 DIAGNOSIS — M75.82 TENDINITIS OF LEFT ROTATOR CUFF: ICD-10-CM

## 2018-07-31 DIAGNOSIS — M75.22 BICEPS TENDONITIS ON LEFT: ICD-10-CM

## 2018-07-31 PROCEDURE — 97110 THERAPEUTIC EXERCISES: CPT

## 2018-07-31 PROCEDURE — 97140 MANUAL THERAPY 1/> REGIONS: CPT

## 2018-07-31 NOTE — PROGRESS NOTES
Daily Note     Today's date: 2018  Patient name: Nico Schreiber  : 1950  MRN: [id number]  Referring provider: Demetra Villalobos MD  Dx:   Encounter Diagnosis     ICD-10-CM    1  Rotator cuff tendonitis, left M75 82    2  Tendinitis of left rotator cuff M75 82    3  Biceps tendonitis on left M75 22                   Subjective: no new c/o      Objective: See treatment diary below  Precautions: recent left Carpal tunnel release and ganglion cyst excision  Manuals 7/5 7/10 7/17 7/19 7/24 7/26 7/31      Shoulder mobs grade 3 post and inf mobs             Shoulder prom all planes 5' 5' 5' 5' 4' 4' 4'      Left shldr pec infraspinatus and subscap tpr 8' 8' 8' 8' 8' 8' 8'      Passive pec  stretch                                   Exercise Diary                       UBE                                            Supine H abd  Blue  3x10 Blue  3x10 Black  2x10 Black 2x10 Black  2x10 Black 2x10 Black  2x10      Supine pnf #blue  3x10 Blue  3x10 Black  2x10  black 2x10 Black  2x10 Black 2x10 Black  2x10      S/l ER 1#  2x10 1#  2x10 1#  2x10 1# 2x10 1#  2x10 1# 2x10 1#  2x10      S/l abd np            Prone flex 3#  2x10 3#  2x10 3#  2x10 5# 2x10 5#  2x10 5# 2x10 5#  2x10      Prone ext 2x10*   2x10 2#  2x10 2# 2x10 5#  2x10 5# 2x10 5#  2x10      Prone Habd 2x10 2x10 2x10 2# 2x10 2#  2x10 2# 2x10 2#  2x10      Prone row 3#  3x10 3#  2x10 5#  2x10 5# 2x10 5#  2x10 5# 2x10 5#  2x10      Supine flexion np            shld row and ext Blue  2x10 ea Blue  2x10 Blue  3x10 Blue 3x10 Black  2x10 Black 2x10 Black  2x10           theraband IR         Dbl green  2x10  dbl green 2x10  dbl  Green  2x10        Theraband ER     singl green  2x10 sngl green 2x10 singl  Green  2x10      Lift off standing          2x10  2x10  2x10            Assessment: Tolerated treatment with soreness with IR   Patient demonstrated fatigue post treatment and exhibited good technique with therapeutic exercises      Plan: Potential discharge next visit

## 2018-08-02 ENCOUNTER — OFFICE VISIT (OUTPATIENT)
Dept: PHYSICAL THERAPY | Facility: CLINIC | Age: 68
End: 2018-08-02
Payer: MEDICARE

## 2018-08-02 DIAGNOSIS — M75.22 BICEPS TENDONITIS ON LEFT: ICD-10-CM

## 2018-08-02 DIAGNOSIS — M75.82 TENDINITIS OF LEFT ROTATOR CUFF: ICD-10-CM

## 2018-08-02 DIAGNOSIS — M75.82 ROTATOR CUFF TENDONITIS, LEFT: Primary | ICD-10-CM

## 2018-08-02 PROCEDURE — 97110 THERAPEUTIC EXERCISES: CPT

## 2018-08-02 PROCEDURE — 97140 MANUAL THERAPY 1/> REGIONS: CPT

## 2018-08-02 PROCEDURE — G8991 OTHER PT/OT GOAL STATUS: HCPCS

## 2018-08-02 PROCEDURE — G8992 OTHER PT/OT  D/C STATUS: HCPCS

## 2018-08-02 NOTE — PROGRESS NOTES
Daily Note     Today's date: 2018  Patient name: Toshia Edwards  : 1950  MRN: [id number]  Referring provider: Trini Kimbrough MD  Dx:   Encounter Diagnosis     ICD-10-CM    1  Rotator cuff tendonitis, left M75 82    2  Tendinitis of left rotator cuff M75 82    3  Biceps tendonitis on left M75 22                   Subjective: Patient noted that he feels stiffness pre treatment,  post treatment stiffness decreases  Objective: See treatment diary below      Assessment: Tolerated treatment well  STM helped to decrease restrictions in shoulder  Updated and reviewed HEP with patient  Plan: Patient D/C by primary therapist today  to HEP    Precautions: recent left Carpal tunnel release and ganglion cyst excision  Manuals 7/5 7/10 7/17 7/19 7/24 7/26 7/31  8       Shoulder mobs grade 3 post and inf mobs                       Shoulder prom all planes 5' 5' 5' 5' 4' 4' 4'  8       Left shldr pec infraspinatus and subscap tpr 8' 8' 8' 8' 8' 8' 8'  8       Passive pec  stretch                                               Exercise Diary                        UBE                                               Supine H abd  Blue  3x10 Blue  3x10 Black  2x10 Black 2x10 Black  2x10 Black 2x10 Black  2x10  Black  2x10       Supine pnf #blue  3x10 Blue  3x10 Black  2x10  black 2x10 Black  2x10 Black 2x10 Black  2x10  Black  2x10       S/l ER 1#  2x10 1#  2x10 1#  2x10 1# 2x10 1#  2x10 1# 2x10 1#  2x10  1#  2x10       S/l abd np                     Prone flex 3#  2x10 3#  2x10 3#  2x10 5# 2x10 5#  2x10 5# 2x10 5#  2x10  5#  2x10       Prone ext 2x10*    2x10 2#  2x10 2# 2x10 5#  2x10 5# 2x10 5#  2x10  5#  2x10       Prone Habd 2x10 2x10 2x10 2# 2x10 2#  2x10 2# 2x10 2#  2x10  2#  2x10       Prone row 3#  3x10 3#  2x10 5#  2x10 5# 2x10 5#  2x10 5# 2x10 5#  2x10  5#  2x10       Supine flexion np                     shld row and ext Blue  2x10 ea Blue  2x10 Blue  3x10 Blue 3x10 Black  2x10 Black 2x10 Black  2x10  Black  2x10           theraband IR         Dbl green  2x10  dbl green 2x10  dbl  Green  2x10  single GTB 2x10       Theraband ER         singl green  2x10 sngl green 2x10 singl  Green  2x10  singl  Green  2x10       Lift off standing          2x10  2x10  2x10  2x10

## 2018-08-16 ENCOUNTER — TRANSCRIBE ORDERS (OUTPATIENT)
Dept: ADMINISTRATIVE | Facility: HOSPITAL | Age: 68
End: 2018-08-16

## 2018-08-16 ENCOUNTER — APPOINTMENT (OUTPATIENT)
Dept: LAB | Facility: CLINIC | Age: 68
End: 2018-08-16
Payer: MEDICARE

## 2018-08-16 DIAGNOSIS — K21.9 GASTROESOPHAGEAL REFLUX DISEASE, ESOPHAGITIS PRESENCE NOT SPECIFIED: ICD-10-CM

## 2018-08-16 DIAGNOSIS — Z13.6 SCREENING FOR CARDIOVASCULAR CONDITION: ICD-10-CM

## 2018-08-16 DIAGNOSIS — Z11.59 NEED FOR HEPATITIS C SCREENING TEST: ICD-10-CM

## 2018-08-16 DIAGNOSIS — Z12.5 SCREENING PSA (PROSTATE SPECIFIC ANTIGEN): ICD-10-CM

## 2018-08-16 DIAGNOSIS — E78.5 HYPERLIPIDEMIA, UNSPECIFIED HYPERLIPIDEMIA TYPE: ICD-10-CM

## 2018-08-16 DIAGNOSIS — E78.5 HYPERLIPIDEMIA, UNSPECIFIED HYPERLIPIDEMIA TYPE: Primary | ICD-10-CM

## 2018-08-16 DIAGNOSIS — E78.5 DYSLIPIDEMIA: ICD-10-CM

## 2018-08-16 LAB
ALBUMIN SERPL BCP-MCNC: 3.7 G/DL (ref 3.5–5)
ALP SERPL-CCNC: 75 U/L (ref 46–116)
ALT SERPL W P-5'-P-CCNC: 71 U/L (ref 12–78)
ANION GAP SERPL CALCULATED.3IONS-SCNC: 8 MMOL/L (ref 4–13)
AST SERPL W P-5'-P-CCNC: 40 U/L (ref 5–45)
BILIRUB SERPL-MCNC: 0.65 MG/DL (ref 0.2–1)
BUN SERPL-MCNC: 12 MG/DL (ref 5–25)
CALCIUM SERPL-MCNC: 9.2 MG/DL (ref 8.3–10.1)
CHLORIDE SERPL-SCNC: 104 MMOL/L (ref 100–108)
CHOLEST SERPL-MCNC: 180 MG/DL (ref 50–200)
CO2 SERPL-SCNC: 26 MMOL/L (ref 21–32)
CREAT SERPL-MCNC: 0.95 MG/DL (ref 0.6–1.3)
GFR SERPL CREATININE-BSD FRML MDRD: 82 ML/MIN/1.73SQ M
GLUCOSE P FAST SERPL-MCNC: 93 MG/DL (ref 65–99)
HCV AB SER QL: NORMAL
HDLC SERPL-MCNC: 47 MG/DL (ref 40–60)
LDLC SERPL CALC-MCNC: 107 MG/DL (ref 0–100)
NONHDLC SERPL-MCNC: 133 MG/DL
POTASSIUM SERPL-SCNC: 4 MMOL/L (ref 3.5–5.3)
PROT SERPL-MCNC: 7.6 G/DL (ref 6.4–8.2)
SODIUM SERPL-SCNC: 138 MMOL/L (ref 136–145)
TRIGL SERPL-MCNC: 128 MG/DL

## 2018-08-16 PROCEDURE — G0103 PSA SCREENING: HCPCS

## 2018-08-16 PROCEDURE — 80061 LIPID PANEL: CPT

## 2018-08-16 PROCEDURE — 36415 COLL VENOUS BLD VENIPUNCTURE: CPT

## 2018-08-16 PROCEDURE — 86803 HEPATITIS C AB TEST: CPT

## 2018-08-16 PROCEDURE — 80053 COMPREHEN METABOLIC PANEL: CPT

## 2018-08-17 LAB — PSA SERPL DL<=0.01 NG/ML-MCNC: 0.45 NG/ML (ref 0–4)

## 2018-08-22 DIAGNOSIS — K21.9 GASTROESOPHAGEAL REFLUX DISEASE WITHOUT ESOPHAGITIS: ICD-10-CM

## 2018-08-22 RX ORDER — PANTOPRAZOLE SODIUM 40 MG/1
40 TABLET, DELAYED RELEASE ORAL DAILY
Qty: 90 TABLET | Refills: 1 | Status: SHIPPED | OUTPATIENT
Start: 2018-08-22 | End: 2019-03-21

## 2018-09-20 ENCOUNTER — OFFICE VISIT (OUTPATIENT)
Dept: FAMILY MEDICINE CLINIC | Facility: CLINIC | Age: 68
End: 2018-09-20
Payer: MEDICARE

## 2018-09-20 VITALS
HEIGHT: 69 IN | WEIGHT: 239.8 LBS | BODY MASS INDEX: 35.52 KG/M2 | HEART RATE: 66 BPM | DIASTOLIC BLOOD PRESSURE: 88 MMHG | SYSTOLIC BLOOD PRESSURE: 110 MMHG

## 2018-09-20 DIAGNOSIS — E78.5 HYPERLIPIDEMIA, UNSPECIFIED HYPERLIPIDEMIA TYPE: ICD-10-CM

## 2018-09-20 DIAGNOSIS — Z23 NEED FOR PNEUMOCOCCAL VACCINATION: ICD-10-CM

## 2018-09-20 DIAGNOSIS — J30.9 ALLERGIC RHINITIS, UNSPECIFIED SEASONALITY, UNSPECIFIED TRIGGER: ICD-10-CM

## 2018-09-20 DIAGNOSIS — K21.9 GASTROESOPHAGEAL REFLUX DISEASE WITHOUT ESOPHAGITIS: Primary | ICD-10-CM

## 2018-09-20 PROBLEM — K63.5 BENIGN COLON POLYP: Status: ACTIVE | Noted: 2017-01-19

## 2018-09-20 PROCEDURE — G0009 ADMIN PNEUMOCOCCAL VACCINE: HCPCS

## 2018-09-20 PROCEDURE — 99214 OFFICE O/P EST MOD 30 MIN: CPT | Performed by: FAMILY MEDICINE

## 2018-09-20 PROCEDURE — 90670 PCV13 VACCINE IM: CPT

## 2018-09-20 RX ORDER — DIPHENHYDRAMINE HCL 50 MG
50 CAPSULE ORAL EVERY 6 HOURS PRN
COMMUNITY

## 2018-09-20 NOTE — PROGRESS NOTES
Assessment/Plan:     Diagnoses and all orders for this visit:    Gastroesophageal reflux disease without esophagitis    Allergic rhinitis, unspecified seasonality, unspecified trigger    Hyperlipidemia, unspecified hyperlipidemia type    Need for pneumococcal vaccination  -     PNEUMOCOCCAL CONJUGATE VACCINE 13-VALENT GREATER THAN 6 MONTHS    Other orders  -     diphenhydrAMINE (BENADRYL) 50 mg capsule; Take 50 mg by mouth every 6 (six) hours as needed for itching      would consider trial of Zantac to help with the pantoprazole as well as fexofenadine for allergy symptoms  Prevnar 13 given today  Patient follow-up in 6 months or sooner if needed      Subjective:     No chief complaint on file  Patient ID: Dorothyann Cowden is a 80 y o  male  Patient here for six-month checkup chronic conditions  Patient states that his pantoprazole is not holding him throughout the course of the day  He is also having some allergy issues as well  Other conditions are stable        The following portions of the patient's history were reviewed and updated as appropriate: allergies, current medications, past family history, past medical history, past social history, past surgical history and problem list     Review of Systems   Constitutional: Negative  HENT: Negative  Eyes: Negative  Respiratory: Negative  Cardiovascular: Negative  Gastrointestinal: Negative  Endocrine: Negative  Genitourinary: Negative  Musculoskeletal: Negative  Skin: Negative  Allergic/Immunologic: Negative  Neurological: Negative  Hematological: Negative  Psychiatric/Behavioral: Negative  All other systems reviewed and are negative          Objective:    Vitals:    09/20/18 0912   BP: 110/88   BP Location: Left arm   Patient Position: Sitting   Cuff Size: Standard   Pulse: 66   Weight: 109 kg (239 lb 12 8 oz)   Height: 5' 9" (1 753 m)          Physical Exam   Constitutional: He is oriented to person, place, and time  He appears well-developed and well-nourished  No distress  HENT:   Head: Normocephalic  Right Ear: External ear normal    Left Ear: External ear normal    Nose: Nose normal    Mouth/Throat: Oropharynx is clear and moist    Eyes: Conjunctivae and EOM are normal  Pupils are equal, round, and reactive to light  Right eye exhibits no discharge  Left eye exhibits no discharge  Neck: Normal range of motion  Cardiovascular: Normal rate, regular rhythm and normal heart sounds  Pulmonary/Chest: Effort normal and breath sounds normal    Abdominal: Soft  Bowel sounds are normal  He exhibits no distension  There is no tenderness  Musculoskeletal: Normal range of motion  Neurological: He is alert and oriented to person, place, and time  No cranial nerve deficit  Skin: Skin is warm and dry  No rash noted  Psychiatric: He has a normal mood and affect  His behavior is normal  Judgment and thought content normal    Nursing note and vitals reviewed

## 2018-10-25 DIAGNOSIS — E78.5 HYPERLIPIDEMIA, UNSPECIFIED HYPERLIPIDEMIA TYPE: ICD-10-CM

## 2018-10-25 RX ORDER — SIMVASTATIN 40 MG
40 TABLET ORAL
Qty: 90 TABLET | Refills: 1 | Status: SHIPPED | OUTPATIENT
Start: 2018-10-25 | End: 2019-04-22 | Stop reason: SDUPTHER

## 2018-11-30 ENCOUNTER — TELEPHONE (OUTPATIENT)
Dept: FAMILY MEDICINE CLINIC | Facility: CLINIC | Age: 68
End: 2018-11-30

## 2018-12-10 ENCOUNTER — TELEPHONE (OUTPATIENT)
Dept: FAMILY MEDICINE CLINIC | Facility: CLINIC | Age: 68
End: 2018-12-10

## 2018-12-10 DIAGNOSIS — K21.9 GASTROESOPHAGEAL REFLUX DISEASE WITHOUT ESOPHAGITIS: Primary | ICD-10-CM

## 2018-12-10 RX ORDER — ESOMEPRAZOLE MAGNESIUM 40 MG/1
40 CAPSULE, DELAYED RELEASE ORAL DAILY
Qty: 90 CAPSULE | Refills: 1 | Status: SHIPPED | OUTPATIENT
Start: 2018-12-10 | End: 2019-05-06 | Stop reason: SDUPTHER

## 2018-12-10 NOTE — TELEPHONE ENCOUNTER
Patient would like to switch his gerd medication from Protonix to Nexium (esopramzole)  Protonix is not working  He would like this to be sent to [facility] in [place] for a 90 day supply  Please advise

## 2019-03-21 ENCOUNTER — APPOINTMENT (OUTPATIENT)
Dept: RADIOLOGY | Facility: CLINIC | Age: 69
End: 2019-03-21
Payer: MEDICARE

## 2019-03-21 ENCOUNTER — OFFICE VISIT (OUTPATIENT)
Dept: FAMILY MEDICINE CLINIC | Facility: CLINIC | Age: 69
End: 2019-03-21
Payer: MEDICARE

## 2019-03-21 VITALS
BODY MASS INDEX: 34.66 KG/M2 | HEIGHT: 69 IN | DIASTOLIC BLOOD PRESSURE: 70 MMHG | WEIGHT: 234 LBS | OXYGEN SATURATION: 98 % | RESPIRATION RATE: 15 BRPM | SYSTOLIC BLOOD PRESSURE: 118 MMHG | TEMPERATURE: 97.9 F | HEART RATE: 73 BPM

## 2019-03-21 DIAGNOSIS — M17.0 BILATERAL PRIMARY OSTEOARTHRITIS OF KNEE: ICD-10-CM

## 2019-03-21 DIAGNOSIS — E78.5 HYPERLIPIDEMIA, UNSPECIFIED HYPERLIPIDEMIA TYPE: ICD-10-CM

## 2019-03-21 DIAGNOSIS — Z23 NEED FOR SHINGLES VACCINE: ICD-10-CM

## 2019-03-21 DIAGNOSIS — K21.9 GASTROESOPHAGEAL REFLUX DISEASE WITHOUT ESOPHAGITIS: ICD-10-CM

## 2019-03-21 DIAGNOSIS — Z00.00 MEDICARE ANNUAL WELLNESS VISIT, SUBSEQUENT: Primary | ICD-10-CM

## 2019-03-21 PROCEDURE — G0439 PPPS, SUBSEQ VISIT: HCPCS | Performed by: FAMILY MEDICINE

## 2019-03-21 PROCEDURE — 73562 X-RAY EXAM OF KNEE 3: CPT

## 2019-03-21 PROCEDURE — 99214 OFFICE O/P EST MOD 30 MIN: CPT | Performed by: FAMILY MEDICINE

## 2019-03-21 NOTE — PROGRESS NOTES
Assessment/Plan:     Diagnoses and all orders for this visit:    Bilateral primary osteoarthritis of knee  -     XR knee 3 vw left non injury; Future  -     XR knee 3 vw right non injury; Future  -     Ambulatory referral to Orthopedic Surgery; Future    Gastroesophageal reflux disease without esophagitis    Hyperlipidemia, unspecified hyperlipidemia type    Need for shingles vaccine  -     Zoster Vac Recomb Adjuvanted 50 MCG/0 5ML SUSR; Inject 1 Dose into a muscle once for 1 dose      will order x-rays of both knees as well as referred orthopedic as I feel he is getting arthritis  Will continue his Nexium now the patient has figured out a way to pay for it  He will continue his other medications  His cholesterol is under control from his recent lab work  He follows up in 6 months or sooner if needed      Subjective:     Chief Complaint   Patient presents with    Knee Pain     bilateral knee pain begining several months ago  states he struggles getting up after sitting  Patient ID: Veronica Souza is a 77 y o  male  Patient is here for checkup of chronic conditions  He states that the pantoprazole did not work for him  The Nexium works great for him but was not covered  He has figured out a way of pain for his Nexium and that is was continuing to take  He is also complaining of bilateral knee pain he states that kneeling stooping and trying to get up is becoming significantly much harder  He is having pain in his knees more on a chronic basis  We discussed his cholesterol which is stable and doing well      The following portions of the patient's history were reviewed and updated as appropriate: allergies, current medications, past family history, past medical history, past social history, past surgical history and problem list     Review of Systems   Constitutional: Negative  HENT: Negative  Eyes: Negative  Respiratory: Negative  Cardiovascular: Negative  Gastrointestinal: Negative  Endocrine: Negative  Genitourinary: Negative  Musculoskeletal: Negative  Skin: Negative  Allergic/Immunologic: Negative  Neurological: Negative  Hematological: Negative  Psychiatric/Behavioral: Negative  All other systems reviewed and are negative  Objective:    Vitals:    03/21/19 0945   BP: 118/70   BP Location: Left arm   Patient Position: Sitting   Cuff Size: Large   Pulse: 73   Resp: 15   Temp: 97 9 °F (36 6 °C)   TempSrc: Tympanic   SpO2: 98%   Weight: 106 kg (234 lb)   Height: 5' 9" (1 753 m)          Physical Exam   Constitutional: He is oriented to person, place, and time  He appears well-developed and well-nourished  No distress  HENT:   Head: Normocephalic  Right Ear: External ear normal    Left Ear: External ear normal    Nose: Nose normal    Mouth/Throat: Oropharynx is clear and moist    Eyes: Pupils are equal, round, and reactive to light  Conjunctivae and EOM are normal  Right eye exhibits no discharge  Left eye exhibits no discharge  Neck: Normal range of motion  Cardiovascular: Normal rate, regular rhythm and normal heart sounds  Pulmonary/Chest: Effort normal and breath sounds normal    Abdominal: Soft  Bowel sounds are normal  He exhibits no distension  There is no tenderness  Musculoskeletal: Normal range of motion  Arthritic changes to to knees   Neurological: He is alert and oriented to person, place, and time  No cranial nerve deficit  Skin: Skin is warm and dry  No rash noted  Psychiatric: He has a normal mood and affect  His behavior is normal  Judgment and thought content normal    Nursing note and vitals reviewed

## 2019-04-20 DIAGNOSIS — E78.5 HYPERLIPIDEMIA, UNSPECIFIED HYPERLIPIDEMIA TYPE: ICD-10-CM

## 2019-04-22 DIAGNOSIS — E78.5 HYPERLIPIDEMIA, UNSPECIFIED HYPERLIPIDEMIA TYPE: ICD-10-CM

## 2019-04-22 RX ORDER — SIMVASTATIN 40 MG
40 TABLET ORAL
Qty: 90 TABLET | Refills: 1 | Status: SHIPPED | OUTPATIENT
Start: 2019-04-22 | End: 2019-07-05 | Stop reason: SDUPTHER

## 2019-04-22 RX ORDER — SIMVASTATIN 40 MG
40 TABLET ORAL
Qty: 90 TABLET | Refills: 1 | OUTPATIENT
Start: 2019-04-22

## 2019-05-06 ENCOUNTER — OFFICE VISIT (OUTPATIENT)
Dept: FAMILY MEDICINE CLINIC | Facility: CLINIC | Age: 69
End: 2019-05-06
Payer: MEDICARE

## 2019-05-06 VITALS
WEIGHT: 223.6 LBS | DIASTOLIC BLOOD PRESSURE: 84 MMHG | TEMPERATURE: 97.1 F | SYSTOLIC BLOOD PRESSURE: 120 MMHG | BODY MASS INDEX: 33.12 KG/M2 | HEIGHT: 69 IN | HEART RATE: 76 BPM

## 2019-05-06 DIAGNOSIS — J40 BRONCHITIS: Primary | ICD-10-CM

## 2019-05-06 DIAGNOSIS — K21.9 GASTROESOPHAGEAL REFLUX DISEASE WITHOUT ESOPHAGITIS: ICD-10-CM

## 2019-05-06 PROCEDURE — 99213 OFFICE O/P EST LOW 20 MIN: CPT | Performed by: FAMILY MEDICINE

## 2019-05-06 RX ORDER — AZITHROMYCIN 250 MG/1
TABLET, FILM COATED ORAL
Qty: 6 TABLET | Refills: 0 | Status: SHIPPED | OUTPATIENT
Start: 2019-05-06 | End: 2019-05-10

## 2019-05-06 RX ORDER — ESOMEPRAZOLE MAGNESIUM 40 MG/1
40 CAPSULE, DELAYED RELEASE ORAL DAILY
Qty: 90 CAPSULE | Refills: 1 | Status: SHIPPED | OUTPATIENT
Start: 2019-05-06 | End: 2019-11-25 | Stop reason: SDUPTHER

## 2019-05-06 RX ORDER — METHYLPREDNISOLONE 4 MG/1
TABLET ORAL
Qty: 21 TABLET | Refills: 0 | Status: SHIPPED | OUTPATIENT
Start: 2019-05-06 | End: 2019-07-05 | Stop reason: ALTCHOICE

## 2019-05-06 RX ORDER — DEXTROMETHORPHAN HYDROBROMIDE AND PROMETHAZINE HYDROCHLORIDE 15; 6.25 MG/5ML; MG/5ML
5 SYRUP ORAL 4 TIMES DAILY PRN
Qty: 118 ML | Refills: 0 | Status: SHIPPED | OUTPATIENT
Start: 2019-05-06 | End: 2019-07-05 | Stop reason: ALTCHOICE

## 2019-06-27 ENCOUNTER — APPOINTMENT (OUTPATIENT)
Dept: LAB | Facility: CLINIC | Age: 69
End: 2019-06-27
Payer: MEDICARE

## 2019-06-27 ENCOUNTER — TRANSCRIBE ORDERS (OUTPATIENT)
Dept: ADMINISTRATIVE | Facility: HOSPITAL | Age: 69
End: 2019-06-27

## 2019-06-27 DIAGNOSIS — Z01.818 PREOP TESTING: Primary | ICD-10-CM

## 2019-06-27 DIAGNOSIS — N48.6 PEYRONIE'S DISEASE: ICD-10-CM

## 2019-06-27 DIAGNOSIS — Z01.818 PREOP TESTING: ICD-10-CM

## 2019-06-27 LAB
ANION GAP SERPL CALCULATED.3IONS-SCNC: 6 MMOL/L (ref 4–13)
BASOPHILS # BLD AUTO: 0.06 THOUSANDS/ΜL (ref 0–0.1)
BASOPHILS NFR BLD AUTO: 1 % (ref 0–1)
BUN SERPL-MCNC: 13 MG/DL (ref 5–25)
CALCIUM SERPL-MCNC: 9.5 MG/DL (ref 8.3–10.1)
CHLORIDE SERPL-SCNC: 104 MMOL/L (ref 100–108)
CO2 SERPL-SCNC: 28 MMOL/L (ref 21–32)
CREAT SERPL-MCNC: 0.9 MG/DL (ref 0.6–1.3)
EOSINOPHIL # BLD AUTO: 0.12 THOUSAND/ΜL (ref 0–0.61)
EOSINOPHIL NFR BLD AUTO: 2 % (ref 0–6)
ERYTHROCYTE [DISTWIDTH] IN BLOOD BY AUTOMATED COUNT: 14 % (ref 11.6–15.1)
GFR SERPL CREATININE-BSD FRML MDRD: 87 ML/MIN/1.73SQ M
GLUCOSE P FAST SERPL-MCNC: 105 MG/DL (ref 65–99)
HCT VFR BLD AUTO: 47.1 % (ref 36.5–49.3)
HGB BLD-MCNC: 15.7 G/DL (ref 12–17)
IMM GRANULOCYTES # BLD AUTO: 0.04 THOUSAND/UL (ref 0–0.2)
IMM GRANULOCYTES NFR BLD AUTO: 1 % (ref 0–2)
LYMPHOCYTES # BLD AUTO: 2.65 THOUSANDS/ΜL (ref 0.6–4.47)
LYMPHOCYTES NFR BLD AUTO: 35 % (ref 14–44)
MCH RBC QN AUTO: 31.6 PG (ref 26.8–34.3)
MCHC RBC AUTO-ENTMCNC: 33.3 G/DL (ref 31.4–37.4)
MCV RBC AUTO: 95 FL (ref 82–98)
MONOCYTES # BLD AUTO: 0.63 THOUSAND/ΜL (ref 0.17–1.22)
MONOCYTES NFR BLD AUTO: 8 % (ref 4–12)
NEUTROPHILS # BLD AUTO: 4.04 THOUSANDS/ΜL (ref 1.85–7.62)
NEUTS SEG NFR BLD AUTO: 53 % (ref 43–75)
NRBC BLD AUTO-RTO: 0 /100 WBCS
PLATELET # BLD AUTO: 260 THOUSANDS/UL (ref 149–390)
PMV BLD AUTO: 10 FL (ref 8.9–12.7)
POTASSIUM SERPL-SCNC: 4.1 MMOL/L (ref 3.5–5.3)
RBC # BLD AUTO: 4.97 MILLION/UL (ref 3.88–5.62)
SODIUM SERPL-SCNC: 138 MMOL/L (ref 136–145)
WBC # BLD AUTO: 7.54 THOUSAND/UL (ref 4.31–10.16)

## 2019-06-27 PROCEDURE — 85025 COMPLETE CBC W/AUTO DIFF WBC: CPT

## 2019-06-27 PROCEDURE — 87086 URINE CULTURE/COLONY COUNT: CPT

## 2019-06-27 PROCEDURE — 36415 COLL VENOUS BLD VENIPUNCTURE: CPT

## 2019-06-27 PROCEDURE — 80048 BASIC METABOLIC PNL TOTAL CA: CPT

## 2019-06-28 LAB — BACTERIA UR CULT: NORMAL

## 2019-07-05 ENCOUNTER — TELEPHONE (OUTPATIENT)
Dept: FAMILY MEDICINE CLINIC | Facility: CLINIC | Age: 69
End: 2019-07-05

## 2019-07-05 ENCOUNTER — OFFICE VISIT (OUTPATIENT)
Dept: FAMILY MEDICINE CLINIC | Facility: CLINIC | Age: 69
End: 2019-07-05
Payer: MEDICARE

## 2019-07-05 VITALS
DIASTOLIC BLOOD PRESSURE: 76 MMHG | HEART RATE: 93 BPM | BODY MASS INDEX: 33.21 KG/M2 | WEIGHT: 224.2 LBS | SYSTOLIC BLOOD PRESSURE: 130 MMHG | OXYGEN SATURATION: 98 % | HEIGHT: 69 IN | TEMPERATURE: 97.9 F

## 2019-07-05 DIAGNOSIS — Z87.891 FORMER SMOKER: ICD-10-CM

## 2019-07-05 DIAGNOSIS — E78.5 HYPERLIPIDEMIA, UNSPECIFIED HYPERLIPIDEMIA TYPE: ICD-10-CM

## 2019-07-05 DIAGNOSIS — K21.9 GERD WITHOUT ESOPHAGITIS: ICD-10-CM

## 2019-07-05 DIAGNOSIS — N48.6 PEYRONIE'S DISEASE: ICD-10-CM

## 2019-07-05 DIAGNOSIS — Z01.818 PREOP EXAMINATION: Primary | ICD-10-CM

## 2019-07-05 PROCEDURE — 99214 OFFICE O/P EST MOD 30 MIN: CPT | Performed by: FAMILY MEDICINE

## 2019-07-05 RX ORDER — SIMVASTATIN 40 MG
40 TABLET ORAL
Qty: 90 TABLET | Refills: 0 | Status: SHIPPED | OUTPATIENT
Start: 2019-07-05 | End: 2019-10-29 | Stop reason: SDUPTHER

## 2019-07-05 NOTE — PROGRESS NOTES
FAMILY PRACTICE PRE-OPERATIVE EVALUATION  [facility] - [facility] FAMILY PRACTICE    NAME: Toshia Edwards  AGE: 77 y o  SEX: male  : 1950     DATE: 2019    Family Practice Pre-Operative Evaluation      Chief Complaint: Pre-operative Evaluation     Surgery: Excision Penile Plaque  Anticipated Date of Surgery: 19  Referring Provider: Dr Brianne Keene      History of Present Illness:     Toshia Edwards is a 77 y o  male who presents to the office today for a preoperative consultation at the request of surgeon, Dr Brianne Keene, who plans on performing excision of penile plaque on 19  Planned anesthesia is general  Patient has a bleeding risk of: no recent abnormal bleeding  Patient does not have objections to receiving blood products if needed  Current anti-platelet/anti-coagulation medications that the patient is prescribed includes: none  Assessment of Chronic Conditions:   - GERD: well controlled on PPI   HLD: on statin therapy      Assessment of Cardiac Risk:  · Denies unstable or severe angina or MI in the last 6 weeks or history of stent placement in the last year   · Denies decompensated heart failure (e g  New onset heart failure, NYHA functional class IV heart failure, or worsening existing heart failure)  · Denies significant arrhythmias such as high grade AV block, symptomatic ventricular arrhythmia, newly recognized ventricular tachycardia, supraventricular tachycardia with resting heart rate >100, or symptomatic bradycardia  · Denies severe heart valve disease including aortic stenosis or symptomatic mitral stenosis     Exercise Capacity:  · Able to walk 4 blocks without symptoms?: Yes  · Able to walk 2 flights without symptoms?: Yes    Prior Anesthesia Reactions: No     Personal history of venous thromboembolic disease? No    History of steroid use for >2 weeks within last year?  No         Review of Systems:     Review of Systems   Constitutional: Negative for chills and fever  HENT: Negative for congestion, postnasal drip, rhinorrhea and sinus pain  Eyes: Negative for photophobia and visual disturbance  Respiratory: Negative for cough and shortness of breath  Cardiovascular: Negative for chest pain, palpitations and leg swelling  Gastrointestinal: Negative for abdominal pain, constipation, diarrhea, nausea and vomiting  Genitourinary: Negative for difficulty urinating and dysuria  Musculoskeletal: Negative for arthralgias and myalgias  Skin: Negative for rash  Neurological: Negative for dizziness and syncope         Current Problem List:     Patient Active Problem List   Diagnosis    Carpal tunnel syndrome of left wrist    Tendinitis of left rotator cuff    Biceps tendonitis on left    Benign colon polyp    Hyperlipidemia    Bronchitis    Preop examination    Peyronie's disease    GERD without esophagitis    Former smoker       Allergies:     No Known Allergies    Current Medications:       Current Outpatient Medications:     Cholecalciferol (VITAMIN D PO), Take by mouth, Disp: , Rfl:     Coenzyme Q10 (CO Q 10 PO), Take 200 mg by mouth, Disp: , Rfl:     diphenhydrAMINE (BENADRYL) 50 mg capsule, Take 50 mg by mouth every 6 (six) hours as needed for itching, Disp: , Rfl:     esomeprazole (NexIUM) 40 MG capsule, Take 1 capsule (40 mg total) by mouth daily, Disp: 90 capsule, Rfl: 1    simvastatin (ZOCOR) 40 mg tablet, Take 1 tablet (40 mg total) by mouth daily at bedtime, Disp: 90 tablet, Rfl: 1    Past Medical History:       Past Medical History:   Diagnosis Date    AC joint arthropathy     BPH (benign prostatic hyperplasia)     Carpal tunnel syndrome     bilateral    Chronic prostatitis     last assessed: 3/1/2014    Ganglion     last assessed: 10/7/2013    GERD without esophagitis     Hyperlipidemia     Impingement syndrome of right shoulder     last assessed: 3/22/2016    Left inguinal hernia     description: repaired by Dr Kyle Leal Peyronie's disease     Pneumonia of left upper lobe due to infectious organism [facility])     last assessed: 2016    Sexual dysfunction     Stomach disorder         Past Surgical History:   Procedure Laterality Date    COLONOSCOPY      COLONOSCOPY      CYSTOSCOPY      Diagnostic last assessed: 2014    CYSTOSTOMY      urethral stricture    HERNIA REPAIR      NEUROPLASTY / TRANSPOSITION MEDIAN NERVE AT CARPAL TUNNEL      LA ANKLE SCOPE,PLANTAR FASCIOTOMY Left 2016    Procedure: PLANTAR FASCIOTOMY;  Surgeon: Markus Jefferson DPM;  [facility];  Service: Podiatry    LA EXCIS PRIMARY GANGLION WRIST Left 2018    Procedure: WRIST RESECTION VOLAR GANGLION CYST;  Surgeon: Chaya Medrano MD;  [facility];  Service: Orthopedics    LA REVISE MEDIAN N/CARPAL TUNNEL SURG Right 2016    Procedure: CARPAL TUNNEL RELEASE ;  Surgeon: Chaya Medrano MD;  [facility];  Service: Orthopedics    LA REVISE MEDIAN N/CARPAL TUNNEL SURG Left 2018    Procedure: RELEASE CARPAL TUNNEL;  Surgeon: Chaya Medrano MD;  [facility];  Service: Orthopedics        Family History   Problem Relation Age of Onset    Cancer Father         bladder        Social History     Socioeconomic History    Marital status: /Civil Union     Spouse name: Not on file    Number of children: Not on file    Years of education: Not on file    Highest education level: Not on file   Occupational History    Occupation: Working full time    Social Needs    Financial resource strain: Not on file    Food insecurity:     Worry: Not on file     Inability: Not on file   [employer] needs:     Medical: Not on file     Non-medical: Not on file   Tobacco Use    Smoking status: Former Smoker     Last attempt to quit:      Years since quittin 5    Smokeless tobacco: Never Used   Substance and Sexual Activity    Alcohol use: Yes     Comment: social and denies alcohol use causing problems     Drug use: No    Sexual activity: Not on file   Lifestyle    Physical activity:     Days per week: Not on file     Minutes per session: Not on file    Stress: Not on file   Relationships    Social connections:     Talks on phone: Not on file     Gets together: Not on file     Attends [employer] service: Not on file     Active member of club or organization: Not on file     Attends meetings of clubs or organizations: Not on file     Relationship status: Not on file    Intimate partner violence:     Fear of current or ex partner: Not on file     Emotionally abused: Not on file     Physically abused: Not on file     Forced sexual activity: Not on file   Other Topics Concern    Not on file   Social History Narrative    Not on file        Physical Exam:     /76 (BP Location: Left arm, Patient Position: Sitting, Cuff Size: Large)   Pulse 93   Temp 97 9 °F (36 6 °C) (Tympanic)   Ht 5' 9" (1 753 m)   Wt 102 kg (224 lb 3 2 oz)   SpO2 98%   BMI 33 11 kg/m²     Physical Exam   Constitutional: He is oriented to person, place, and time  He appears well-developed and well-nourished  HENT:   Head: Normocephalic and atraumatic  Right Ear: External ear normal    Left Ear: External ear normal    Mouth/Throat: Oropharynx is clear and moist    Eyes: Pupils are equal, round, and reactive to light  Conjunctivae and EOM are normal    Neck: Normal range of motion  Neck supple  Cardiovascular: Normal rate, regular rhythm and normal heart sounds  No murmur heard  Pulmonary/Chest: Effort normal and breath sounds normal  No respiratory distress  He has no wheezes  Abdominal: Soft  Bowel sounds are normal  He exhibits no distension  Musculoskeletal: Normal range of motion  He exhibits no edema  Neurological: He is alert and oriented to person, place, and time  Skin: Skin is warm and dry  Psychiatric: He has a normal mood and affect   His behavior is normal         Data:     Pre-operative work-up    Laboratory Results: I have personally reviewed the pertinent laboratory results/reports   Lab Results   Component Value Date    WBC 7 54 06/27/2019    HGB 15 7 06/27/2019    HCT 47 1 06/27/2019    MCV 95 06/27/2019     06/27/2019       Lab Results   Component Value Date    GLUCOSE 105 03/11/2015    CALCIUM 9 5 06/27/2019     03/11/2015    SODIUM 138 06/27/2019    K 4 1 06/27/2019    CO2 28 06/27/2019     06/27/2019    BUN 13 06/27/2019    CREATININE 0 90 06/27/2019           Assessment & Recommendations:     1  Preop examination     2  Peyronie's disease     3  Hyperlipidemia, unspecified hyperlipidemia type     4  GERD without esophagitis     5  Former smoker  CT lung screening program       Pre-Op Evaluation Assessment  77 y o  male with planned surgery: penile plaque removal  Known risk factors for perioperative complications: None  Pre-Op Evaluation Plan  1  Further preoperative workup as follows:   - None; no further preoperative work-up is required    2  Medication Management/Recommendations:   - None, continue medication regimen including morning of surgery, with sip of water    3  Prophylaxis for cardiac events with perioperative beta-blockers: not indicated  4  Patient requires further consultation with: None    Clearance  Patient is CLEARED for surgery without any additional cardiac testing       [facility], [address] [place] [address]  [address] [address]  Phone#  [phone]  Fax#  [phone]

## 2019-08-16 ENCOUNTER — OFFICE VISIT (OUTPATIENT)
Dept: FAMILY MEDICINE CLINIC | Facility: CLINIC | Age: 69
End: 2019-08-16
Payer: MEDICARE

## 2019-08-16 VITALS
HEIGHT: 69 IN | BODY MASS INDEX: 32.32 KG/M2 | WEIGHT: 218.2 LBS | DIASTOLIC BLOOD PRESSURE: 78 MMHG | SYSTOLIC BLOOD PRESSURE: 116 MMHG | TEMPERATURE: 97.1 F | OXYGEN SATURATION: 98 % | HEART RATE: 87 BPM

## 2019-08-16 DIAGNOSIS — N30.00 ACUTE CYSTITIS WITHOUT HEMATURIA: Primary | ICD-10-CM

## 2019-08-16 DIAGNOSIS — R35.0 FREQUENT URINATION: ICD-10-CM

## 2019-08-16 PROBLEM — N39.0 UTI (URINARY TRACT INFECTION): Status: ACTIVE | Noted: 2019-08-16

## 2019-08-16 LAB
SL AMB  POCT GLUCOSE, UA: ABNORMAL
SL AMB LEUKOCYTE ESTERASE,UA: ABNORMAL
SL AMB POCT BILIRUBIN,UA: ABNORMAL
SL AMB POCT BLOOD,UA: ABNORMAL
SL AMB POCT CLARITY,UA: ABNORMAL
SL AMB POCT COLOR,UA: YELLOW
SL AMB POCT KETONES,UA: ABNORMAL
SL AMB POCT NITRITE,UA: POSITIVE
SL AMB POCT PH,UA: 7
SL AMB POCT SPECIFIC GRAVITY,UA: 1.02
SL AMB POCT URINE PROTEIN: ABNORMAL
SL AMB POCT UROBILINOGEN: NORMAL

## 2019-08-16 PROCEDURE — 99213 OFFICE O/P EST LOW 20 MIN: CPT | Performed by: FAMILY MEDICINE

## 2019-08-16 PROCEDURE — 81002 URINALYSIS NONAUTO W/O SCOPE: CPT | Performed by: FAMILY MEDICINE

## 2019-08-16 RX ORDER — CIPROFLOXACIN 500 MG/1
500 TABLET, FILM COATED ORAL EVERY 12 HOURS SCHEDULED
Qty: 14 TABLET | Refills: 0 | Status: SHIPPED | OUTPATIENT
Start: 2019-08-16 | End: 2019-08-23

## 2019-08-16 NOTE — PROGRESS NOTES
Jocelyne Judd 1950 male MRN: [id number]    Family Medicine Acute Visit    ASSESSMENT/PLAN  Problem List Items Addressed This Visit        Genitourinary    UTI (urinary tract infection) - Primary    Relevant Medications    ciprofloxacin (CIPRO) 500 mg tablet       Other    Frequent urination    Relevant Orders    POCT urine dip (Completed)    Urine culture          Patient UA is consistent with infection  He states last time he had this he did well with cipro  Will start treatment, however given his recent urologic surgery and numerous urology concerns he was advised to see urology for follow up-thankfully he has an apt already scheduled  He was counseled on signs of worsening infection and ER precautions  We will call with results of urine culture once available  Future Appointments   Date Time Provider Alexandro Dial   8/19/2019  9:15 AM VITOR Arnold BRAEDEN [facility]   9/27/2019 10:00 AM DO NEIL Mejia  [facility]          SUBJECTIVE  CC: Urinary Tract Infection (frequent urination withoutt completly emptying bladder, foul smelling urine  )      HPI:  Jocelyne Judd is a 77 y o  male who presents for acute visit,  Thinks he has a UTI  Complains of frequent urination and odor with urination  Some chills  Review of Systems   Constitutional: Negative for chills and fever  HENT: Negative for congestion, postnasal drip, rhinorrhea and sinus pain  Eyes: Negative for photophobia and visual disturbance  Respiratory: Negative for cough and shortness of breath  Cardiovascular: Negative for chest pain, palpitations and leg swelling  Gastrointestinal: Negative for abdominal pain, constipation, diarrhea, nausea and vomiting  Genitourinary: Positive for dysuria  Negative for difficulty urinating  Musculoskeletal: Negative for arthralgias and myalgias  Skin: Negative for rash  Neurological: Negative for dizziness and syncope         Historical Information   The patient history was reviewed as follows:  Past Medical History:   Diagnosis Date    AC joint arthropathy     BPH (benign prostatic hyperplasia)     Carpal tunnel syndrome     bilateral    Chronic prostatitis     last assessed: 3/1/2014    Ganglion     last assessed: 10/7/2013    GERD without esophagitis     Hyperlipidemia     Impingement syndrome of right shoulder     last assessed: 3/22/2016    Left inguinal hernia     description: repaired by Dr Abraham Shannon Peyronie's disease     Pneumonia of left upper lobe due to infectious organism [facility])     last assessed: 2/8/2016    Sexual dysfunction     Stomach disorder          Past Surgical History:   Procedure Laterality Date    COLONOSCOPY      COLONOSCOPY      CYSTOSCOPY      Diagnostic last assessed: 6/12/2014    CYSTOSTOMY      urethral stricture    HERNIA REPAIR      NEUROPLASTY / TRANSPOSITION MEDIAN NERVE AT CARPAL TUNNEL      NE ANKLE SCOPE,PLANTAR FASCIOTOMY Left 11/18/2016    Procedure: PLANTAR FASCIOTOMY;  Surgeon: Sindhu Quijano DPM;  [facility];  Service: Podiatry    NE EXCIS PRIMARY GANGLION WRIST Left 1/24/2018    Procedure: WRIST RESECTION VOLAR GANGLION CYST;  Surgeon: Tapan Gtz MD;  [facility];  Service: Orthopedics    NE REVISE MEDIAN N/CARPAL TUNNEL SURG Right 8/24/2016    Procedure: CARPAL TUNNEL RELEASE ;  Surgeon: Tapan Gtz MD;  [facility];  Service: Orthopedics    NE REVISE MEDIAN N/CARPAL TUNNEL SURG Left 1/24/2018    Procedure: RELEASE CARPAL TUNNEL;  Surgeon: Tapan Gtz MD;  [facility];  Service: Orthopedics     Family History   Problem Relation Age of Onset    Cancer Father         bladder      Social History   Social History     Substance and Sexual Activity   Alcohol Use Yes    Comment: social and denies alcohol use causing problems      Social History     Substance and Sexual Activity   Drug Use No     Social History     Tobacco Use   Smoking Status Former Smoker    Last attempt to quit: 2014    Years since quittin 6   Smokeless Tobacco Never Used       Medications:     Current Outpatient Medications:     Cholecalciferol (VITAMIN D PO), Take by mouth, Disp: , Rfl:     Coenzyme Q10 (CO Q 10 PO), Take 200 mg by mouth, Disp: , Rfl:     diphenhydrAMINE (BENADRYL) 50 mg capsule, Take 50 mg by mouth every 6 (six) hours as needed for itching, Disp: , Rfl:     esomeprazole (NexIUM) 40 MG capsule, Take 1 capsule (40 mg total) by mouth daily, Disp: 90 capsule, Rfl: 1    simvastatin (ZOCOR) 40 mg tablet, Take 1 tablet (40 mg total) by mouth daily at bedtime, Disp: 90 tablet, Rfl: 0    ciprofloxacin (CIPRO) 500 mg tablet, Take 1 tablet (500 mg total) by mouth every 12 (twelve) hours for 7 days, Disp: 14 tablet, Rfl: 0    No Known Allergies    OBJECTIVE  Vitals:   Vitals:    19 0958   BP: 116/78   BP Location: Right arm   Patient Position: Sitting   Cuff Size: Large   Pulse: 87   Temp: (!) 97 1 °F (36 2 °C)   TempSrc: Tympanic   SpO2: 98%   Weight: 99 kg (218 lb 3 2 oz)   Height: 5' 9" (1 753 m)         Physical Exam   Constitutional: He is oriented to person, place, and time  He appears well-developed and well-nourished  HENT:   Head: Normocephalic and atraumatic  Right Ear: External ear normal    Left Ear: External ear normal    Mouth/Throat: Oropharynx is clear and moist    Eyes: Pupils are equal, round, and reactive to light  Conjunctivae and EOM are normal    Neck: Normal range of motion  Neck supple  Cardiovascular: Normal rate, regular rhythm and normal heart sounds  No murmur heard  Pulmonary/Chest: Effort normal and breath sounds normal  No respiratory distress  He has no wheezes  Abdominal: Soft  Bowel sounds are normal  He exhibits no distension  There is no tenderness  There is no rigidity, no rebound, no guarding and no CVA tenderness  Musculoskeletal: Normal range of motion  He exhibits no edema     Neurological: He is alert and oriented to person, place, and time    Skin: Skin is warm and dry  Psychiatric: He has a normal mood and affect   His behavior is normal                   Clara Bocanegra DO    8/16/2019

## 2019-08-20 DIAGNOSIS — N30.00 ACUTE CYSTITIS WITHOUT HEMATURIA: Primary | ICD-10-CM

## 2019-08-20 RX ORDER — NITROFURANTOIN 25; 75 MG/1; MG/1
100 CAPSULE ORAL 2 TIMES DAILY
Qty: 14 CAPSULE | Refills: 0 | Status: SHIPPED | OUTPATIENT
Start: 2019-08-20 | End: 2019-08-27

## 2019-09-04 ENCOUNTER — TELEPHONE (OUTPATIENT)
Dept: UROLOGY | Facility: AMBULATORY SURGERY CENTER | Age: 69
End: 2019-09-04

## 2019-09-04 ENCOUNTER — OFFICE VISIT (OUTPATIENT)
Dept: UROLOGY | Facility: AMBULATORY SURGERY CENTER | Age: 69
End: 2019-09-04
Payer: MEDICARE

## 2019-09-04 VITALS
WEIGHT: 216 LBS | SYSTOLIC BLOOD PRESSURE: 132 MMHG | BODY MASS INDEX: 31.99 KG/M2 | HEART RATE: 80 BPM | HEIGHT: 69 IN | DIASTOLIC BLOOD PRESSURE: 88 MMHG

## 2019-09-04 DIAGNOSIS — N30.00 ACUTE CYSTITIS WITHOUT HEMATURIA: Primary | ICD-10-CM

## 2019-09-04 DIAGNOSIS — R35.0 URINARY FREQUENCY: ICD-10-CM

## 2019-09-04 DIAGNOSIS — Z87.448 H/O URETHRAL STRICTURE: ICD-10-CM

## 2019-09-04 PROCEDURE — 99202 OFFICE O/P NEW SF 15 MIN: CPT | Performed by: NURSE PRACTITIONER

## 2019-09-04 NOTE — TELEPHONE ENCOUNTER
LMOM for patient that there is an opening in [facility] for cysto

## 2019-09-04 NOTE — PROGRESS NOTES
9/4/2019      Chief Complaint   Patient presents with    Urinary Tract Infection     Assessment and Plan    77 y o  male managed by NEW PATIENT     1  Urethral Stricture  · Macrobid - started on 8-16-19  · Unable to provide urine testing at today's visit  · Recommend cystoscopy  · Urinalysis and Urine culture ordered  · Will prescribe antibiotics based off of results of urine culture      History of Present Illness  Rafat Jim is a 77 y o  male here for follow up evaluation of  Urinary symptoms related to urethral stricture  Pt reports currently taking Macrobid due to a urinary tract infection  He was initially started on Ciprofloxacin but transitioned to [address] upon receipt of sensitivities of urine culture  Culture positive for E  Coli  Pt with c/o urinary frequency, urgency and incompletel bladder emptying, He reports urinary stream to come out in a spraying  He denies dysuria and gross hematuria  Review of Systems   Constitutional: Negative for chills and fever  Respiratory: Negative for cough and shortness of breath  Cardiovascular: Negative for chest pain  Gastrointestinal: Negative for abdominal distention, abdominal pain, blood in stool, nausea and vomiting  Genitourinary: Positive for frequency and urgency  Negative for difficulty urinating, dysuria, enuresis, flank pain and hematuria  Spraying urinary stream   Musculoskeletal: Negative for back pain  Skin: Negative for rash  Neurological: Negative for dizziness  Urinary Incontinence Screening      Most Recent Value   Urinary Incontinence   Urinary Incontinence? No   Incomplete emptying? No   Urinary frequency? Yes   Urinary urgency? No   Urinary hesitancy? No   Dysuria (painful difficult urination)? No   Nocturia (waking up to use the bathroom)? No   Straining (having to push to go)? No   Weak stream?  No   Intermittent stream?  No   Post void dribbling?   No        Past Medical History  Past Medical History: Diagnosis Date    AC joint arthropathy     BPH (benign prostatic hyperplasia)     Carpal tunnel syndrome     bilateral    Chronic prostatitis     last assessed: 3/1/2014    Ganglion     last assessed: 10/7/2013    GERD without esophagitis     Hyperlipidemia     Impingement syndrome of right shoulder     last assessed: 3/22/2016    Left inguinal hernia     description: repaired by Dr Bobby Dylon Peyronie's disease     Pneumonia of left upper lobe due to infectious organism [facility])     last assessed: 2016    Sexual dysfunction     Stomach disorder        Past Social History  Past Surgical History:   Procedure Laterality Date    COLONOSCOPY      COLONOSCOPY      CYSTOSCOPY      Diagnostic last assessed: 2014    CYSTOSTOMY      urethral stricture    HERNIA REPAIR      NEUROPLASTY / TRANSPOSITION MEDIAN NERVE AT CARPAL TUNNEL      HI ANKLE SCOPE,PLANTAR FASCIOTOMY Left 2016    Procedure: PLANTAR FASCIOTOMY;  Surgeon: Brian Yadav DPM;  [facility];  Service: Podiatry    HI EXCIS PRIMARY GANGLION WRIST Left 2018    Procedure: WRIST RESECTION VOLAR GANGLION CYST;  Surgeon: Cyn Alston MD;  [facility];  Service: Orthopedics    HI REVISE MEDIAN N/CARPAL TUNNEL SURG Right 2016    Procedure: CARPAL TUNNEL RELEASE ;  Surgeon: Cyn Alston MD;  [facility];  Service: Orthopedics    HI REVISE MEDIAN N/CARPAL TUNNEL SURG Left 2018    Procedure: RELEASE CARPAL TUNNEL;  Surgeon: Cyn Alston MD;  [facility];  Service: Orthopedics     Social History     Tobacco Use   Smoking Status Former Smoker    Last attempt to quit:     Years since quittin 7   Smokeless Tobacco Never Used       Past Family History  Family History   Problem Relation Age of Onset    Cancer Father         bladder       Past Social history  Social History     Socioeconomic History    Marital status: /Civil Union     Spouse name: Not on file    Number of children: Not on file    Years of education: Not on file    Highest education level: Not on file   Occupational History    Occupation: Working full time    Social Needs    Financial resource strain: Not on file    Food insecurity:     Worry: Not on file     Inability: Not on file   [employer] needs:     Medical: Not on file     Non-medical: Not on file   Tobacco Use    Smoking status: Former Smoker     Last attempt to quit:      Years since quittin 7    Smokeless tobacco: Never Used   Substance and Sexual Activity    Alcohol use: Yes     Comment: social and denies alcohol use causing problems     Drug use: No    Sexual activity: Not on file   Lifestyle    Physical activity:     Days per week: Not on file     Minutes per session: Not on file    Stress: Not on file   Relationships    Social connections:     Talks on phone: Not on file     Gets together: Not on file     Attends [employer] service: Not on file     Active member of club or organization: Not on file     Attends meetings of clubs or organizations: Not on file     Relationship status: Not on file    Intimate partner violence:     Fear of current or ex partner: Not on file     Emotionally abused: Not on file     Physically abused: Not on file     Forced sexual activity: Not on file   Other Topics Concern    Not on file   Social History Narrative    Not on file       Current Medications  Current Outpatient Medications   Medication Sig Dispense Refill    Cholecalciferol (VITAMIN D PO) Take by mouth      Coenzyme Q10 (CO Q 10 PO) Take 200 mg by mouth      diphenhydrAMINE (BENADRYL) 50 mg capsule Take 50 mg by mouth every 6 (six) hours as needed for itching      esomeprazole (NexIUM) 40 MG capsule Take 1 capsule (40 mg total) by mouth daily 90 capsule 1    simvastatin (ZOCOR) 40 mg tablet Take 1 tablet (40 mg total) by mouth daily at bedtime 90 tablet 0     No current facility-administered medications for this visit  Allergies  No Known Allergies      The following portions of the patient's history were reviewed and updated as appropriate: allergies, current medications, past medical history, past social history, past surgical history and problem list       Vitals  Vitals:    09/04/19 1412   BP: 132/88   BP Location: Left arm   Patient Position: Sitting   Cuff Size: Adult   Pulse: 80   Weight: 98 kg (216 lb)   Height: 5' 9" (1 753 m)     Physical Exam  Physical Exam   Constitutional: He is oriented to person, place, and time  He appears well-nourished  HENT:   Head: Atraumatic  Eyes: EOM are normal    Neck: Neck supple  Pulmonary/Chest: Effort normal    Musculoskeletal: Normal range of motion  Neurological: He is alert and oriented to person, place, and time  Skin: Skin is warm and dry  Psychiatric: He has a normal mood and affect  Vitals reviewed      Results  No results found for this or any previous visit (from the past 1 hour(s)) ]  Lab Results   Component Value Date    PSA 0 4 01/16/2017     Lab Results   Component Value Date    GLUCOSE 105 03/11/2015    CALCIUM 9 5 06/27/2019     03/11/2015    K 4 1 06/27/2019    CO2 28 06/27/2019     06/27/2019    BUN 13 06/27/2019    CREATININE 0 90 06/27/2019     Lab Results   Component Value Date    WBC 7 54 06/27/2019    HGB 15 7 06/27/2019    HCT 47 1 06/27/2019    MCV 95 06/27/2019     06/27/2019     Orders  Orders Placed This Encounter   Procedures    Urine culture     Standing Status:   Future     Number of Occurrences:   1     Standing Expiration Date:   9/4/2020    Urinalysis with microscopic     Standing Status:   Future     Number of Occurrences:   1     Standing Expiration Date:   9/4/2020    Cystoscopy     Standing Status:   Future     Standing Expiration Date:   9/4/2020       VITOR Zuniga

## 2019-09-04 NOTE — PATIENT INSTRUCTIONS
Schedule Cystoscopy  Urine culture and Urinalysis ordered  Will base antibiotics off of the new urine culture

## 2019-09-05 NOTE — TELEPHONE ENCOUNTER
I had spoken with patient and he stated that he will keep the ov that he has scheduled with Dr Lukasz Bee

## 2019-09-09 ENCOUNTER — APPOINTMENT (OUTPATIENT)
Dept: LAB | Facility: CLINIC | Age: 69
End: 2019-09-09
Payer: MEDICARE

## 2019-09-09 DIAGNOSIS — N30.00 ACUTE CYSTITIS WITHOUT HEMATURIA: ICD-10-CM

## 2019-09-09 LAB
BACTERIA UR QL AUTO: ABNORMAL /HPF
BILIRUB UR QL STRIP: NEGATIVE
CLARITY UR: ABNORMAL
COLOR UR: YELLOW
GLUCOSE UR STRIP-MCNC: NEGATIVE MG/DL
HGB UR QL STRIP.AUTO: NEGATIVE
HYALINE CASTS #/AREA URNS LPF: ABNORMAL /LPF
KETONES UR STRIP-MCNC: NEGATIVE MG/DL
LEUKOCYTE ESTERASE UR QL STRIP: ABNORMAL
NITRITE UR QL STRIP: POSITIVE
NON-SQ EPI CELLS URNS QL MICRO: ABNORMAL /HPF
PH UR STRIP.AUTO: 6.5 [PH]
PROT UR STRIP-MCNC: NEGATIVE MG/DL
RBC #/AREA URNS AUTO: ABNORMAL /HPF
SP GR UR STRIP.AUTO: 1.01 (ref 1–1.03)
UROBILINOGEN UR QL STRIP.AUTO: 1 E.U./DL
WBC #/AREA URNS AUTO: ABNORMAL /HPF

## 2019-09-09 PROCEDURE — 87086 URINE CULTURE/COLONY COUNT: CPT

## 2019-09-09 PROCEDURE — 81001 URINALYSIS AUTO W/SCOPE: CPT

## 2019-09-09 PROCEDURE — 87077 CULTURE AEROBIC IDENTIFY: CPT

## 2019-09-09 PROCEDURE — 87181 SC STD AGAR DILUTION PER AGT: CPT

## 2019-09-09 PROCEDURE — 87186 SC STD MICRODIL/AGAR DIL: CPT

## 2019-09-11 ENCOUNTER — TELEPHONE (OUTPATIENT)
Dept: UROLOGY | Facility: AMBULATORY SURGERY CENTER | Age: 69
End: 2019-09-11

## 2019-09-11 DIAGNOSIS — B95.2 UTI (URINARY TRACT INFECTION) DUE TO ENTEROCOCCUS: Primary | ICD-10-CM

## 2019-09-11 DIAGNOSIS — N39.0 UTI (URINARY TRACT INFECTION) DUE TO ENTEROCOCCUS: Primary | ICD-10-CM

## 2019-09-11 RX ORDER — AMOXICILLIN AND CLAVULANATE POTASSIUM 875; 125 MG/1; MG/1
1 TABLET, FILM COATED ORAL EVERY 12 HOURS SCHEDULED
Qty: 14 TABLET | Refills: 0 | Status: SHIPPED | OUTPATIENT
Start: 2019-09-11 | End: 2019-09-18

## 2019-09-11 NOTE — TELEPHONE ENCOUNTER
augmentin sent to the pharmacy on file  Unfortunately patient with significant resistance antibiotics and has recently completed a course of antibiotic therapy with Macrobid

## 2019-09-12 LAB — BACTERIA UR CULT: ABNORMAL

## 2019-09-13 ENCOUNTER — PROCEDURE VISIT (OUTPATIENT)
Dept: UROLOGY | Facility: AMBULATORY SURGERY CENTER | Age: 69
End: 2019-09-13
Payer: MEDICARE

## 2019-09-13 VITALS
WEIGHT: 218 LBS | SYSTOLIC BLOOD PRESSURE: 150 MMHG | HEART RATE: 58 BPM | BODY MASS INDEX: 32.29 KG/M2 | HEIGHT: 69 IN | DIASTOLIC BLOOD PRESSURE: 82 MMHG

## 2019-09-13 DIAGNOSIS — Z87.448 H/O URETHRAL STRICTURE: Primary | ICD-10-CM

## 2019-09-13 DIAGNOSIS — N30.00 ACUTE CYSTITIS WITHOUT HEMATURIA: ICD-10-CM

## 2019-09-13 LAB
POST-VOID RESIDUAL VOLUME, ML POC: 98 ML
SL AMB  POCT GLUCOSE, UA: NORMAL
SL AMB LEUKOCYTE ESTERASE,UA: NORMAL
SL AMB POCT BILIRUBIN,UA: NORMAL
SL AMB POCT BLOOD,UA: NORMAL
SL AMB POCT CLARITY,UA: CLEAR
SL AMB POCT COLOR,UA: YELLOW
SL AMB POCT KETONES,UA: NORMAL
SL AMB POCT NITRITE,UA: NORMAL
SL AMB POCT PH,UA: 7
SL AMB POCT SPECIFIC GRAVITY,UA: 1.01
SL AMB POCT URINE PROTEIN: NORMAL
SL AMB POCT UROBILINOGEN: 0.2

## 2019-09-13 PROCEDURE — 51798 US URINE CAPACITY MEASURE: CPT | Performed by: UROLOGY

## 2019-09-13 PROCEDURE — 52281 CYSTOSCOPY AND TREATMENT: CPT | Performed by: UROLOGY

## 2019-09-13 PROCEDURE — 81002 URINALYSIS NONAUTO W/O SCOPE: CPT | Performed by: UROLOGY

## 2019-09-13 NOTE — PROGRESS NOTES
Patient of Dr Froylan Gonzáles with history of bulbar urethral stricture, status post DVIU and 2014 and subsequent dilations  Recently he underwent surgery at [facility] for Peyronie's plaque  He presents today with complaint of urinary tract infection type symptoms  He is concerned about his urethral stricture recurring  Cystoscopy  Date/Time: 9/13/2019 11:51 AM  Performed by: Mitch Huynh MD  Authorized by: Mitch Huynh MD     Additional Procedure Details: Flexible cystoscopy note     The patient was prepped and draped in sterile fashion  2% lidocaine jelly was instilled per urethra   The 16 [place] flexible cystoscope was placed per urethra  Recurrent bulbar urethral stricture was identified  This was relatively easily passed with the cystoscope and dilated with mild discomfort  Prostate was not obstructing  Evaluation of the bladder reveals bilateral normal ureteral orifices   There is no evidence of urothelial carcinoma or suspicious lesion   The scope was removed and the procedure was terminated  The patient tolerated the procedure well  Bladder scan postvoid residual was performed subsequently, revealing 98 mL  Plan  Discussed findings with the patient  He has noticed some improvement in voiding after the procedure  He will observe symptoms for now  He understands that it is likely his stricture will recur  If he notices symptoms, he will notify us and can be re-evaluated  May require additional internal urethrotomy  Otherwise he will follow up in 6 months for re-evaluation with cystoscopy, as he is concerned about waiting an entire year

## 2019-09-27 ENCOUNTER — OFFICE VISIT (OUTPATIENT)
Dept: FAMILY MEDICINE CLINIC | Facility: CLINIC | Age: 69
End: 2019-09-27
Payer: MEDICARE

## 2019-09-27 VITALS
OXYGEN SATURATION: 97 % | WEIGHT: 217 LBS | SYSTOLIC BLOOD PRESSURE: 132 MMHG | TEMPERATURE: 97.7 F | DIASTOLIC BLOOD PRESSURE: 86 MMHG | HEIGHT: 69 IN | HEART RATE: 71 BPM | BODY MASS INDEX: 32.14 KG/M2

## 2019-09-27 DIAGNOSIS — Z13.6 SCREENING FOR CARDIOVASCULAR CONDITION: ICD-10-CM

## 2019-09-27 DIAGNOSIS — E78.5 HYPERLIPIDEMIA, UNSPECIFIED HYPERLIPIDEMIA TYPE: Primary | ICD-10-CM

## 2019-09-27 DIAGNOSIS — Z12.5 SCREENING PSA (PROSTATE SPECIFIC ANTIGEN): ICD-10-CM

## 2019-09-27 DIAGNOSIS — J43.9 PULMONARY EMPHYSEMA, UNSPECIFIED EMPHYSEMA TYPE (HCC): ICD-10-CM

## 2019-09-27 DIAGNOSIS — N35.819 OTHER URETHRAL STRICTURE, MALE, UNSPECIFIED SITE: ICD-10-CM

## 2019-09-27 DIAGNOSIS — Z79.899 HIGH RISK MEDICATION USE: ICD-10-CM

## 2019-09-27 PROBLEM — N48.6 PEYRONIE'S DISEASE: Status: ACTIVE | Noted: 2019-05-14

## 2019-09-27 PROBLEM — N99.114 POSTPROCEDURAL ANTERIOR URETHRAL STRICTURE: Status: ACTIVE | Noted: 2019-05-14

## 2019-09-27 PROCEDURE — 99214 OFFICE O/P EST MOD 30 MIN: CPT | Performed by: FAMILY MEDICINE

## 2019-09-27 NOTE — PROGRESS NOTES
Assessment/Plan:     Diagnoses and all orders for this visit:    Hyperlipidemia, unspecified hyperlipidemia type  -     Lipid panel; Future    Pulmonary emphysema, unspecified emphysema type ([facility] [address] )    Other urethral stricture, male, unspecified site    Screening PSA (prostate specific antigen)  -     Urinalysis with reflex to microscopic  -     PSA, Total Screen; Future    Screening for cardiovascular condition  -     CBC and differential; Future  -     Comprehensive metabolic panel; Future    High risk medication use  -     CBC and differential; Future      labs ordered   Patient to followup with urology  Reminded to get ct lung cancer screening  Otherwise is doing well  Will follow up in 6 months or sooner if needed      Subjective:     Chief Complaint   Patient presents with    Follow-up     6 month f/u         Patient ID: Ellis Linares is a 77 y o  male  Patient is here for six-month checkup chronic conditions  Reports no acute physical complaints today  He has not got a CT lung cancer screening done yet  He does need blood work  He is up-to-date on his medications  He declined flu shot today      The following portions of the patient's history were reviewed and updated as appropriate: allergies, current medications, past family history, past medical history, past social history, past surgical history and problem list     Review of Systems   Constitutional: Negative  HENT: Negative  Eyes: Negative  Respiratory: Negative  Cardiovascular: Negative  Gastrointestinal: Negative  Endocrine: Negative  Genitourinary: Negative  Musculoskeletal: Negative  Skin: Negative  Allergic/Immunologic: Negative  Neurological: Negative  Hematological: Negative  Psychiatric/Behavioral: Negative  All other systems reviewed and are negative          Objective:    Vitals:    09/27/19 1002   BP: 132/86   BP Location: Left arm   Patient Position: Sitting   Cuff Size: Large   Pulse: 71 Temp: 97 7 °F (36 5 °C)   TempSrc: Tympanic   SpO2: 97%   Weight: 98 4 kg (217 lb)   Height: 5' 9" (1 753 m)          Physical Exam   Constitutional: He is oriented to person, place, and time  He appears well-developed and well-nourished  No distress  HENT:   Head: Normocephalic  Right Ear: External ear normal    Left Ear: External ear normal    Nose: Nose normal    Mouth/Throat: Oropharynx is clear and moist    Eyes: Pupils are equal, round, and reactive to light  Conjunctivae and EOM are normal  Right eye exhibits no discharge  Left eye exhibits no discharge  Neck: Normal range of motion  Cardiovascular: Normal rate, regular rhythm and normal heart sounds  Pulmonary/Chest: Effort normal and breath sounds normal    Abdominal: Soft  Bowel sounds are normal  He exhibits no distension  There is no tenderness  Musculoskeletal: Normal range of motion  Neurological: He is alert and oriented to person, place, and time  No cranial nerve deficit  Skin: Skin is warm and dry  No rash noted  Psychiatric: He has a normal mood and affect  His behavior is normal  Judgment and thought content normal    Nursing note and vitals reviewed

## 2019-10-03 ENCOUNTER — TELEPHONE (OUTPATIENT)
Dept: UROLOGY | Facility: CLINIC | Age: 69
End: 2019-10-03

## 2019-10-03 ENCOUNTER — APPOINTMENT (OUTPATIENT)
Dept: LAB | Facility: CLINIC | Age: 69
End: 2019-10-03
Payer: MEDICARE

## 2019-10-03 DIAGNOSIS — Z12.5 SCREENING PSA (PROSTATE SPECIFIC ANTIGEN): ICD-10-CM

## 2019-10-03 DIAGNOSIS — Z79.899 HIGH RISK MEDICATION USE: ICD-10-CM

## 2019-10-03 DIAGNOSIS — R82.90 CLOUDY URINE: Primary | ICD-10-CM

## 2019-10-03 DIAGNOSIS — Z13.6 SCREENING FOR CARDIOVASCULAR CONDITION: ICD-10-CM

## 2019-10-03 DIAGNOSIS — E78.5 HYPERLIPIDEMIA, UNSPECIFIED HYPERLIPIDEMIA TYPE: ICD-10-CM

## 2019-10-03 LAB
ALBUMIN SERPL BCP-MCNC: 4 G/DL (ref 3.5–5)
ALP SERPL-CCNC: 67 U/L (ref 46–116)
ALT SERPL W P-5'-P-CCNC: 54 U/L (ref 12–78)
ANION GAP SERPL CALCULATED.3IONS-SCNC: 6 MMOL/L (ref 4–13)
AST SERPL W P-5'-P-CCNC: 41 U/L (ref 5–45)
BACTERIA UR QL AUTO: ABNORMAL /HPF
BASOPHILS # BLD AUTO: 0.08 THOUSANDS/ΜL (ref 0–0.1)
BASOPHILS NFR BLD AUTO: 1 % (ref 0–1)
BILIRUB SERPL-MCNC: 0.7 MG/DL (ref 0.2–1)
BILIRUB UR QL STRIP: NEGATIVE
BUN SERPL-MCNC: 17 MG/DL (ref 5–25)
CALCIUM SERPL-MCNC: 9.6 MG/DL (ref 8.3–10.1)
CHLORIDE SERPL-SCNC: 103 MMOL/L (ref 100–108)
CHOLEST SERPL-MCNC: 214 MG/DL (ref 50–200)
CLARITY UR: ABNORMAL
CO2 SERPL-SCNC: 28 MMOL/L (ref 21–32)
COLOR UR: ABNORMAL
CREAT SERPL-MCNC: 0.89 MG/DL (ref 0.6–1.3)
EOSINOPHIL # BLD AUTO: 0.05 THOUSAND/ΜL (ref 0–0.61)
EOSINOPHIL NFR BLD AUTO: 1 % (ref 0–6)
ERYTHROCYTE [DISTWIDTH] IN BLOOD BY AUTOMATED COUNT: 13.6 % (ref 11.6–15.1)
GFR SERPL CREATININE-BSD FRML MDRD: 88 ML/MIN/1.73SQ M
GLUCOSE P FAST SERPL-MCNC: 100 MG/DL (ref 65–99)
GLUCOSE UR STRIP-MCNC: NEGATIVE MG/DL
HCT VFR BLD AUTO: 50.4 % (ref 36.5–49.3)
HDLC SERPL-MCNC: 72 MG/DL (ref 40–60)
HGB BLD-MCNC: 16.1 G/DL (ref 12–17)
HGB UR QL STRIP.AUTO: NEGATIVE
HYALINE CASTS #/AREA URNS LPF: ABNORMAL /LPF
IMM GRANULOCYTES # BLD AUTO: 0.07 THOUSAND/UL (ref 0–0.2)
IMM GRANULOCYTES NFR BLD AUTO: 1 % (ref 0–2)
KETONES UR STRIP-MCNC: NEGATIVE MG/DL
LDLC SERPL CALC-MCNC: 126 MG/DL (ref 0–100)
LEUKOCYTE ESTERASE UR QL STRIP: ABNORMAL
LYMPHOCYTES # BLD AUTO: 2.74 THOUSANDS/ΜL (ref 0.6–4.47)
LYMPHOCYTES NFR BLD AUTO: 30 % (ref 14–44)
MCH RBC QN AUTO: 31.5 PG (ref 26.8–34.3)
MCHC RBC AUTO-ENTMCNC: 31.9 G/DL (ref 31.4–37.4)
MCV RBC AUTO: 99 FL (ref 82–98)
MONOCYTES # BLD AUTO: 0.66 THOUSAND/ΜL (ref 0.17–1.22)
MONOCYTES NFR BLD AUTO: 7 % (ref 4–12)
NEUTROPHILS # BLD AUTO: 5.43 THOUSANDS/ΜL (ref 1.85–7.62)
NEUTS SEG NFR BLD AUTO: 60 % (ref 43–75)
NITRITE UR QL STRIP: POSITIVE
NON-SQ EPI CELLS URNS QL MICRO: ABNORMAL /HPF
NONHDLC SERPL-MCNC: 142 MG/DL
NRBC BLD AUTO-RTO: 0 /100 WBCS
PH UR STRIP.AUTO: 6.5 [PH]
PLATELET # BLD AUTO: 234 THOUSANDS/UL (ref 149–390)
PMV BLD AUTO: 10.2 FL (ref 8.9–12.7)
POTASSIUM SERPL-SCNC: 4.4 MMOL/L (ref 3.5–5.3)
PROT SERPL-MCNC: 7.9 G/DL (ref 6.4–8.2)
PROT UR STRIP-MCNC: ABNORMAL MG/DL
PSA SERPL-MCNC: 1.9 NG/ML (ref 0–4)
RBC # BLD AUTO: 5.11 MILLION/UL (ref 3.88–5.62)
RBC #/AREA URNS AUTO: ABNORMAL /HPF
SODIUM SERPL-SCNC: 137 MMOL/L (ref 136–145)
SP GR UR STRIP.AUTO: 1.02 (ref 1–1.03)
TRIGL SERPL-MCNC: 78 MG/DL
UROBILINOGEN UR QL STRIP.AUTO: 1 E.U./DL
WBC # BLD AUTO: 9.03 THOUSAND/UL (ref 4.31–10.16)
WBC #/AREA URNS AUTO: ABNORMAL /HPF

## 2019-10-03 PROCEDURE — 80061 LIPID PANEL: CPT

## 2019-10-03 PROCEDURE — 85025 COMPLETE CBC W/AUTO DIFF WBC: CPT

## 2019-10-03 PROCEDURE — 81001 URINALYSIS AUTO W/SCOPE: CPT | Performed by: FAMILY MEDICINE

## 2019-10-03 PROCEDURE — 80053 COMPREHEN METABOLIC PANEL: CPT

## 2019-10-03 PROCEDURE — G0103 PSA SCREENING: HCPCS

## 2019-10-03 PROCEDURE — 36415 COLL VENOUS BLD VENIPUNCTURE: CPT

## 2019-10-03 NOTE — TELEPHONE ENCOUNTER
Call placed to patient, advised of above plan  Will contact patient with final results of urine culture/plan  Patient verbalized understanding

## 2019-10-03 NOTE — TELEPHONE ENCOUNTER
Patient should await urine culture results and we will treat accordingly  Follow-up with Dr Pema Stein can be altered based on results  ----- Message from Anika Aaron sent at 10/3/2019  3:01 PM EDT -----  Regarding: FW:Your Recent Visit  Contact: [phone]      ----- Message -----  From: Manuel Batres  Sent: 10/3/2019   2:32 PM EDT  To: [facility]  Subject: RE:Your Recent Visit                             Dr Papi Bryant, I feel that the problem with this bladder infection has come back  Dr Denise wrote a scrip for blood work and urine sample  This was done 10/3  My urine is cloudy and foul smelling  I also can't seem to empty out  We have the next visit with Dr Warren Milan  In March  Please review the urine test results and advise next step  Perhaps in the short term I get back on antibiotics  Thank you   ----- Message -----  From: Amita Hernandez MD  Sent: 9/13/2019 11:53 AM EDT  To: Manuel Batres  Subject: Your Recent Visit  Manuel Batres, you have a new After Visit Summary in [address] [place] [address]  Please click on visits and then your most recent appointment, to view your After Visit Summary  If you are experiencing any technical issues please call our patient service desk at [phone]) option [phone]

## 2019-10-08 ENCOUNTER — TELEPHONE (OUTPATIENT)
Dept: UROLOGY | Facility: MEDICAL CENTER | Age: 69
End: 2019-10-08

## 2019-10-08 DIAGNOSIS — N30.00 ACUTE CYSTITIS WITHOUT HEMATURIA: Primary | ICD-10-CM

## 2019-10-08 DIAGNOSIS — R39.9 UTI SYMPTOMS: Primary | ICD-10-CM

## 2019-10-08 RX ORDER — NITROFURANTOIN 25; 75 MG/1; MG/1
100 CAPSULE ORAL 2 TIMES DAILY
Qty: 20 CAPSULE | Refills: 0 | Status: SHIPPED | OUTPATIENT
Start: 2019-10-08 | End: 2019-10-18

## 2019-10-08 NOTE — TELEPHONE ENCOUNTER
Patient calling to ensure his patient message has been received by the office      He can be reached at [phone]

## 2019-10-08 NOTE — TELEPHONE ENCOUNTER
Call placed to patient, advised to call office to discuss  Urinalysis was completed but no urine culture done yet  Will route to provider to review UA

## 2019-10-08 NOTE — TELEPHONE ENCOUNTER
Call placed to patient, advised of antibiotics  Also increased increased hydration  Patient verbalized understanding

## 2019-10-08 NOTE — TELEPHONE ENCOUNTER
I have sent a 10 day prescription nitrofurantoin to the patient's preferred pharmacy  His urinalysis is the same as it was approximately 1 month ago  This choice was based off of his urine culture that was performed at that time  I will continue to monitor for culture results and change the antibiotic if needed

## 2019-10-08 NOTE — TELEPHONE ENCOUNTER
Gulshan Doshi To  [facility] Sent  10/7/2019  4:16 PM   My test results data seems to indicate that my bladder infection has returned  I will need to get on some anti-biotic that will settle things down  Secondly, we launched a follow-up appointment for March 2020   I recently read with sone enthusiasm that there is a new procedure that is minimally invasive from [employer] that touts long term relief for urethral strictures  Are you aware of this or can you refer me to a place that does this procedure? [email or web address] brown [email or web address]  com

## 2019-10-29 DIAGNOSIS — E78.5 HYPERLIPIDEMIA, UNSPECIFIED HYPERLIPIDEMIA TYPE: ICD-10-CM

## 2019-10-29 RX ORDER — SIMVASTATIN 40 MG
40 TABLET ORAL
Qty: 90 TABLET | Refills: 1 | Status: SHIPPED | OUTPATIENT
Start: 2019-10-29 | End: 2020-05-01 | Stop reason: SDUPTHER

## 2019-10-29 NOTE — TELEPHONE ENCOUNTER
Pt left message with answering service requesting appointment as soon as possible to discuss treatment options,I was unsure if the procedure the pt messaged earlier was something the physicians do and who should the pt be scheduled with to discuss MD or AP?

## 2019-10-29 NOTE — TELEPHONE ENCOUNTER
Call placed to patient left detailed message per communication consent form for patient that [employer] is not something that we offer in our offices at this time  Advised that patient would have to research who in the area might offer it and also call insurance company to advise if this would be something that would be covered  Advised patient to call office for appointment with AP if he would like to discuss other options for treatment in our office

## 2019-11-06 ENCOUNTER — HOSPITAL ENCOUNTER (OUTPATIENT)
Dept: CT IMAGING | Facility: CLINIC | Age: 69
Discharge: HOME | End: 2019-11-06
Payer: COMMERCIAL

## 2019-11-06 DIAGNOSIS — Z87.891 FORMER SMOKER: ICD-10-CM

## 2019-11-06 PROCEDURE — G0297 LDCT FOR LUNG CA SCREEN: HCPCS

## 2019-11-06 NOTE — TELEPHONE ENCOUNTER
Patient called answering service  Call to patient revealed another Bladder infection  Antibiotics ran their course    Frequency, foul odor evident  He can be reached at [phone]  Thank you

## 2019-11-06 NOTE — TELEPHONE ENCOUNTER
I would recommend getting set up for cystoscopy for evaluation for stricture  If his urine test comes back positive, cystoscopy could be postponed or canceled if his symptoms improve with antibiotics for UTI

## 2019-11-06 NOTE — TELEPHONE ENCOUNTER
Patient of Dr Eric Schwab in the [place] office with history of bulbar urethral stricture, status post DVIU and 2014 and subsequent dilations  Recently he underwent surgery at [facility] for Peyronie's plaque  Had a cystoscopy done on 9/13/19 by Dr Delma Iyer  Per his follow up plan he was to notify office if symptoms of stricture are recurring and he can be re-evaluated  Called and spoke with patient at this time  He reports that for about a week and a half he has had urinary frequency, urgency and burning with urination  He reports he thinks his stricture may be returning  Instructed patient to have urine testing done to rule out infection  Orders placed for urinalysis and urine culture, instructed patient to go to any [facility] to have these done  Patient verbalized understanding and reports he can go tomorrow  Patient also wanted to set up visit  RN will route conversation to providers for recommendations on what type of visit would be most appropriate and then call him back  Patient verbalized understanding of our conversation

## 2019-11-07 ENCOUNTER — APPOINTMENT (OUTPATIENT)
Dept: LAB | Facility: CLINIC | Age: 69
End: 2019-11-07
Payer: MEDICARE

## 2019-11-07 DIAGNOSIS — R39.9 UTI SYMPTOMS: ICD-10-CM

## 2019-11-07 DIAGNOSIS — J43.8 OTHER EMPHYSEMA (HCC): ICD-10-CM

## 2019-11-07 DIAGNOSIS — Z87.891 FORMER SMOKER: Primary | ICD-10-CM

## 2019-11-07 LAB
BACTERIA UR QL AUTO: ABNORMAL /HPF
BILIRUB UR QL STRIP: NEGATIVE
CLARITY UR: ABNORMAL
COLOR UR: YELLOW
GLUCOSE UR STRIP-MCNC: NEGATIVE MG/DL
HGB UR QL STRIP.AUTO: ABNORMAL
HYALINE CASTS #/AREA URNS LPF: ABNORMAL /LPF
KETONES UR STRIP-MCNC: NEGATIVE MG/DL
LEUKOCYTE ESTERASE UR QL STRIP: ABNORMAL
NITRITE UR QL STRIP: POSITIVE
NON-SQ EPI CELLS URNS QL MICRO: ABNORMAL /HPF
PH UR STRIP.AUTO: 6 [PH]
PROT UR STRIP-MCNC: ABNORMAL MG/DL
RBC #/AREA URNS AUTO: ABNORMAL /HPF
SP GR UR STRIP.AUTO: 1.02 (ref 1–1.03)
UROBILINOGEN UR QL STRIP.AUTO: 0.2 E.U./DL
WBC #/AREA URNS AUTO: ABNORMAL /HPF

## 2019-11-07 PROCEDURE — 87181 SC STD AGAR DILUTION PER AGT: CPT

## 2019-11-07 PROCEDURE — 87086 URINE CULTURE/COLONY COUNT: CPT

## 2019-11-07 PROCEDURE — 81001 URINALYSIS AUTO W/SCOPE: CPT

## 2019-11-07 PROCEDURE — 87077 CULTURE AEROBIC IDENTIFY: CPT

## 2019-11-07 PROCEDURE — 87186 SC STD MICRODIL/AGAR DIL: CPT

## 2019-11-08 RX ORDER — NITROFURANTOIN 25; 75 MG/1; MG/1
100 CAPSULE ORAL 2 TIMES DAILY
Qty: 14 CAPSULE | Refills: 0 | Status: SHIPPED | OUTPATIENT
Start: 2019-11-08 | End: 2019-11-15

## 2019-11-08 NOTE — TELEPHONE ENCOUNTER
Call to patient's home phone and left a detailed message of the instructions given below on his voicemail

## 2019-11-08 NOTE — TELEPHONE ENCOUNTER
Call to patient and informed him of the instructions give by Jolynn CARUSO  Patient states that either way he wants to move forward with a cystoscopy because he gets "these UTI's I take 10 days of ABX and before you know it, its back all over again"  First available in [place] location with either JR or FT was 12/16/19, scheduled patient for JR at 9:00AM and placed him on wait list for both providers  Patient was not willing to go to another office location for sooner appt  Patient then states "im going to need something ASAP for this 'discomfort' "  Urine C&S still in process, so far UA with Micro finalized  Will forward to AP for further review and instructions

## 2019-11-10 LAB — BACTERIA UR CULT: ABNORMAL

## 2019-11-13 ENCOUNTER — TELEPHONE (OUTPATIENT)
Dept: UROLOGY | Facility: AMBULATORY SURGERY CENTER | Age: 69
End: 2019-11-13

## 2019-11-13 NOTE — TELEPHONE ENCOUNTER
----- Message from Azra Ritchie sent at 11/12/2019  5:48 PM EST -----  Regarding: Test Results Question  Contact: [phone]  Given the urine culture latest test results  Is the current medication for 7 days going to carry me until Dec 16th? Also it appears that what I am currently on is not effective with my current symptoms and condition  I need to entirely void my bladder without obstruction  What are your thoughts?

## 2019-11-13 NOTE — TELEPHONE ENCOUNTER
Patient of Raoul/Juan Daniel  Recent urine culture positive for infection  Macrobid prescribed, which was appropriate per sensitivity  Call placed to patient, left detailed message per communication consent form advising that medication was appropriate based on culture results  Patient to call office if symptoms do not resolve after full course of medication  Office number provided on voicemail for any questions/concerns

## 2019-11-24 ENCOUNTER — TELEPHONE (OUTPATIENT)
Dept: OTHER | Facility: OTHER | Age: 69
End: 2019-11-24

## 2019-11-25 DIAGNOSIS — K21.9 GASTROESOPHAGEAL REFLUX DISEASE WITHOUT ESOPHAGITIS: ICD-10-CM

## 2019-11-25 RX ORDER — ESOMEPRAZOLE MAGNESIUM 40 MG/1
40 CAPSULE, DELAYED RELEASE ORAL DAILY
Qty: 90 CAPSULE | Refills: 1 | Status: SHIPPED | OUTPATIENT
Start: 2019-11-25 | End: 2020-06-08 | Stop reason: SDUPTHER

## 2019-12-03 ENCOUNTER — TELEPHONE (OUTPATIENT)
Dept: UROLOGY | Facility: MEDICAL CENTER | Age: 69
End: 2019-12-03

## 2019-12-03 DIAGNOSIS — R30.0 BURNING WITH URINATION: Primary | ICD-10-CM

## 2019-12-03 NOTE — TELEPHONE ENCOUNTER
Patient of Dr Andrew Martin seen in [place]  Patient is again experiencing burning when urinating and believes he may have an infection  Please call him at his work #[phone], ext  [phone]  If unable to reach him, please call his cell at [phone]

## 2019-12-03 NOTE — TELEPHONE ENCOUNTER
Patient of [facility]/[place] office  History of bulbar urethral stricture, status post DVIU and 2014 and subsequent dilations  Seen for cysto 9/2019 and order for 6 month f/u cysto  Patient called with complaints of burning with urination  Denies fever, chills, hematuria  Orders placed for UA C&S to rule out urinary tract infection  Office will follow up as results become available usually within 48-72 hours  Patient encouraged to hydrate well with water and avoid bladder irritants  Patient instructed to call back with worsening symptoms, fever, chills, blood in the urine or difficulty urinating

## 2019-12-04 ENCOUNTER — APPOINTMENT (OUTPATIENT)
Dept: LAB | Facility: CLINIC | Age: 69
End: 2019-12-04
Payer: MEDICARE

## 2019-12-04 DIAGNOSIS — R30.0 BURNING WITH URINATION: ICD-10-CM

## 2019-12-04 LAB
BACTERIA UR QL AUTO: ABNORMAL /HPF
BILIRUB UR QL STRIP: NEGATIVE
CLARITY UR: ABNORMAL
COLOR UR: YELLOW
GLUCOSE UR STRIP-MCNC: NEGATIVE MG/DL
HGB UR QL STRIP.AUTO: NEGATIVE
HYALINE CASTS #/AREA URNS LPF: ABNORMAL /LPF
KETONES UR STRIP-MCNC: NEGATIVE MG/DL
LEUKOCYTE ESTERASE UR QL STRIP: ABNORMAL
NITRITE UR QL STRIP: POSITIVE
NON-SQ EPI CELLS URNS QL MICRO: ABNORMAL /HPF
PH UR STRIP.AUTO: 6 [PH]
PROT UR STRIP-MCNC: NEGATIVE MG/DL
RBC #/AREA URNS AUTO: ABNORMAL /HPF
SP GR UR STRIP.AUTO: 1.02 (ref 1–1.03)
UROBILINOGEN UR QL STRIP.AUTO: 0.2 E.U./DL
WBC #/AREA URNS AUTO: ABNORMAL /HPF

## 2019-12-04 PROCEDURE — 87186 SC STD MICRODIL/AGAR DIL: CPT

## 2019-12-04 PROCEDURE — 81001 URINALYSIS AUTO W/SCOPE: CPT

## 2019-12-04 PROCEDURE — 87086 URINE CULTURE/COLONY COUNT: CPT

## 2019-12-04 PROCEDURE — 87181 SC STD AGAR DILUTION PER AGT: CPT

## 2019-12-04 PROCEDURE — 87077 CULTURE AEROBIC IDENTIFY: CPT

## 2019-12-05 ENCOUNTER — TELEPHONE (OUTPATIENT)
Dept: UROLOGY | Facility: AMBULATORY SURGERY CENTER | Age: 69
End: 2019-12-05

## 2019-12-05 DIAGNOSIS — N30.00 ACUTE CYSTITIS WITHOUT HEMATURIA: Primary | ICD-10-CM

## 2019-12-05 RX ORDER — AMOXICILLIN AND CLAVULANATE POTASSIUM 500; 125 MG/1; MG/1
1 TABLET, FILM COATED ORAL EVERY 12 HOURS SCHEDULED
Qty: 10 TABLET | Refills: 0 | Status: SHIPPED | OUTPATIENT
Start: 2019-12-05 | End: 2019-12-05 | Stop reason: SDUPTHER

## 2019-12-05 RX ORDER — AMOXICILLIN AND CLAVULANATE POTASSIUM 500; 125 MG/1; MG/1
1 TABLET, FILM COATED ORAL EVERY 12 HOURS SCHEDULED
Qty: 10 TABLET | Refills: 0 | Status: SHIPPED | OUTPATIENT
Start: 2019-12-05 | End: 2019-12-10

## 2019-12-05 RX ORDER — AMOXICILLIN AND CLAVULANATE POTASSIUM 500; 125 MG/1; MG/1
1 TABLET, FILM COATED ORAL EVERY 12 HOURS SCHEDULED
Qty: 10 TABLET | Refills: 0 | Status: CANCELLED | OUTPATIENT
Start: 2019-12-05 | End: 2019-12-10

## 2019-12-05 NOTE — TELEPHONE ENCOUNTER
Call placed to patient, left detailed message per communication consent form as to preliminary culture results  Advised office to call back with final results, if medication needs to be changed  Office number provided on voicemail

## 2019-12-05 NOTE — TELEPHONE ENCOUNTER
Call received from patient, is traveling and would like medication sent to a different pharmacy  Will route to provider

## 2019-12-05 NOTE — TELEPHONE ENCOUNTER
----- Message from Varun Cortez PA-C sent at 12/5/2019  9:48 AM EST -----  Preliminary culture positive - augmentin sent to pharmacy  Will need to monitor for final sensitivities

## 2019-12-07 LAB — BACTERIA UR CULT: ABNORMAL

## 2019-12-16 ENCOUNTER — PROCEDURE VISIT (OUTPATIENT)
Dept: UROLOGY | Facility: AMBULATORY SURGERY CENTER | Age: 69
End: 2019-12-16
Payer: MEDICARE

## 2019-12-16 VITALS
WEIGHT: 213 LBS | SYSTOLIC BLOOD PRESSURE: 124 MMHG | BODY MASS INDEX: 31.55 KG/M2 | HEIGHT: 69 IN | HEART RATE: 76 BPM | DIASTOLIC BLOOD PRESSURE: 80 MMHG

## 2019-12-16 DIAGNOSIS — R30.0 BURNING WITH URINATION: Primary | ICD-10-CM

## 2019-12-16 LAB
POST-VOID RESIDUAL VOLUME, ML POC: 0 ML
SL AMB  POCT GLUCOSE, UA: NORMAL
SL AMB LEUKOCYTE ESTERASE,UA: NORMAL
SL AMB POCT BILIRUBIN,UA: NORMAL
SL AMB POCT BLOOD,UA: NORMAL
SL AMB POCT CLARITY,UA: NORMAL
SL AMB POCT COLOR,UA: NORMAL
SL AMB POCT KETONES,UA: NORMAL
SL AMB POCT NITRITE,UA: NORMAL
SL AMB POCT PH,UA: 5
SL AMB POCT SPECIFIC GRAVITY,UA: 1.03
SL AMB POCT URINE PROTEIN: NORMAL
SL AMB POCT UROBILINOGEN: 0.2

## 2019-12-16 PROCEDURE — 87181 SC STD AGAR DILUTION PER AGT: CPT | Performed by: UROLOGY

## 2019-12-16 PROCEDURE — 81002 URINALYSIS NONAUTO W/O SCOPE: CPT | Performed by: UROLOGY

## 2019-12-16 PROCEDURE — 52000 CYSTOURETHROSCOPY: CPT | Performed by: UROLOGY

## 2019-12-16 PROCEDURE — 87077 CULTURE AEROBIC IDENTIFY: CPT | Performed by: UROLOGY

## 2019-12-16 PROCEDURE — 51798 US URINE CAPACITY MEASURE: CPT | Performed by: UROLOGY

## 2019-12-16 PROCEDURE — 87186 SC STD MICRODIL/AGAR DIL: CPT | Performed by: UROLOGY

## 2019-12-16 PROCEDURE — 87086 URINE CULTURE/COLONY COUNT: CPT | Performed by: UROLOGY

## 2019-12-16 PROCEDURE — 99214 OFFICE O/P EST MOD 30 MIN: CPT | Performed by: UROLOGY

## 2019-12-16 RX ORDER — TAMSULOSIN HYDROCHLORIDE 0.4 MG/1
0.4 CAPSULE ORAL
Qty: 30 CAPSULE | Refills: 11 | Status: SHIPPED | OUTPATIENT
Start: 2019-12-16 | End: 2020-01-23 | Stop reason: SINTOL

## 2019-12-16 RX ORDER — AMOXICILLIN AND CLAVULANATE POTASSIUM 875; 125 MG/1; MG/1
1 TABLET, FILM COATED ORAL EVERY 12 HOURS SCHEDULED
Qty: 20 TABLET | Refills: 0 | Status: SHIPPED | OUTPATIENT
Start: 2019-12-16 | End: 2019-12-26

## 2019-12-16 NOTE — LETTER
December 16, 2019     Booker Moody DO  [address]  [address]  [place] [address]    Patient: Zena Latham   YOB: 1950   Date of Visit: 12/16/2019       Dear Dr Ambika Erwin: Thank you for referring Zena Latham to me for evaluation  Below are my notes for this consultation  If you have questions, please do not hesitate to call me  I look forward to following your patient along with you  Sincerely,        Enzo Camarillo MD        CC: No Recipients  Enzo Camarillo MD  12/16/2019 10:03 AM  Sign at close encounter  12/16/2019    Zena Latham  1950  [id number]        Assessment  History of urethral stricture  Recurrent UTI  Peyronie's plaque status post plication    Plan  We discussed today's findings and his recent urologic history  He is quite frustrated by symptoms and believes that there must be a cause  I explained that he is experiencing frequent urinary tract infections, which are likely related to colonization at this point  It seems that this initially developed after his procedure in July  He has been struggling with symptoms since  For now I have prescribed Augmentin once again and will culture the urine  We also decided to start tamsulosin to improve his urinary symptoms  Risks and benefits of this medication discussed  He understands potential lightheadedness, retrograde ejaculation, other less common potential side effects  He will follow up in the next 4 to 6 weeks to re-evaluate  Will check uroflow and postvoid residual again at that time  I have also recommended Cystex over-the-counter urinary health formula for long-term infection prevention  Total visit time was 25 minutes of which over 50% was spent on counseling  History of Present Illness  Mandy Marshall is a 77 y o  male with history of urethral stricture status post DVIU in 2014 with Dr Kenna Peña  He reports that he had done well since then until this July 2019   At that time, he underwent plication surgery for Peyronie's plaque at [address]  Cystoscopy was performed at the time to evaluate his urethral stricture, and red rubber catheter was placed during his surgery  Subsequently developed numerous urinary tract infections, approximately every 3 weeks and he has been on multiple antibiotics  He has been found to have ESBL E coli  He is very frustrated by this  He was recently treated about 10 days ago with Augmentin, and reports recurrent symptoms of frequency, urgency, and cloudy urine now  On 9/13/2019 I performed cystoscopy in the office which revealed a slight recurrent bulbar stricture which was easily passable with the flexible cystoscope  At the time postvoid residual was 98 mL  He wished to be seen and evaluated once again for stricture due to complaint of worsening symptoms as mentioned above  He is concerned that his stricture has recurred  Cystoscopy  Date/Time: 12/16/2019 9:50 AM  Performed by: Fannie Edgar MD  Authorized by: Fannie Edgar MD     Additional Procedure Details: Flexible cystoscopy note     The patient was prepped and draped in sterile fashion  2% lidocaine jelly was instilled per urethra   The 16 [place] flexible cystoscope was placed per urethra  Location of stricture was identified at the bulbar urethra  Once again, this is easily passable with flexible cystoscope  There is some prostate enlargement with partial occlusion of the bladder outlet  Evaluation of the bladder reveals bilateral normal ureteral orifices   There is no evidence of urothelial carcinoma or suspicious lesion   The scope was removed and the procedure was terminated  The patient tolerated the procedure well  He emptied his bladder afterward and bladder scan postvoid residual revealed 0 mL  Review of Systems  Review of Systems   Constitutional: Negative  HENT: Negative  Respiratory: Negative  Cardiovascular: Negative      Gastrointestinal: Negative  Genitourinary:        As per HPI   Musculoskeletal: Negative  Skin: Negative  Neurological: Negative  Hematological: Negative            Past Medical History  Past Medical History:   Diagnosis Date    AC joint arthropathy     BPH (benign prostatic hyperplasia)     Carpal tunnel syndrome     bilateral    Chronic prostatitis     last assessed: 3/1/2014    Ganglion     last assessed: 10/7/2013    GERD without esophagitis     Hyperlipidemia     Impingement syndrome of right shoulder     last assessed: 3/22/2016    Left inguinal hernia     description: repaired by Dr Cinda Concepcion Pedavienie's disease     Pneumonia of left upper lobe due to infectious organism [facility])     last assessed: 2/8/2016    Sexual dysfunction     Stomach disorder        Past Social History  Past Surgical History:   Procedure Laterality Date    COLONOSCOPY      COLONOSCOPY      CYSTOSCOPY      Diagnostic last assessed: 6/12/2014    CYSTOSTOMY      urethral stricture    HERNIA REPAIR      NEUROPLASTY / TRANSPOSITION MEDIAN NERVE AT CARPAL TUNNEL      ND ANKLE SCOPE,PLANTAR FASCIOTOMY Left 11/18/2016    Procedure: PLANTAR FASCIOTOMY;  Surgeon: Jalyn Cordova DPM;  Location: [facility];  Service: Podiatry    ND EXCIS PRIMARY GANGLION WRIST Left 1/24/2018    Procedure: WRIST RESECTION VOLAR GANGLION CYST;  Surgeon: Wendy Huber MD;  Location: [facility];  Service: Orthopedics    ND REVISE MEDIAN N/CARPAL TUNNEL SURG Right 8/24/2016    Procedure: CARPAL TUNNEL RELEASE ;  Surgeon: Wendy Huber MD;  [facility];  Service: Orthopedics    ND REVISE MEDIAN N/CARPAL TUNNEL SURG Left 1/24/2018    Procedure: RELEASE CARPAL TUNNEL;  Surgeon: Wendy Huber MD;  [facility];  Service: Orthopedics       Past Family History  Family History   Problem Relation Age of Onset    Cancer Father         bladder       Past Social history  Social History     Socioeconomic History    Marital status: /Civil Union Spouse name: Not on file    Number of children: Not on file    Years of education: Not on file    Highest education level: Not on file   Occupational History    Occupation: Working full time    Social Needs    Financial resource strain: Not on file    Food insecurity:     Worry: Not on file     Inability: Not on file   Snipd needs:     Medical: Not on file     Non-medical: Not on file   Tobacco Use    Smoking status: Former Smoker     Last attempt to quit:      Years since quittin 9    Smokeless tobacco: Never Used   Substance and Sexual Activity    Alcohol use: Yes     Comment: social and denies alcohol use causing problems     Drug use: No    Sexual activity: Not on file   Lifestyle    Physical activity:     Days per week: Not on file     Minutes per session: Not on file    Stress: Not on file   Relationships    Social connections:     Talks on phone: Not on file     Gets together: Not on file     Attends Taoism service: Not on file     Active member of club or organization: Not on file     Attends meetings of clubs or organizations: Not on file     Relationship status: Not on file    Intimate partner violence:     Fear of current or ex partner: Not on file     Emotionally abused: Not on file     Physically abused: Not on file     Forced sexual activity: Not on file   Other Topics Concern    Not on file   Social History Narrative    Not on file     Social History     Tobacco Use   Smoking Status Former Smoker    Last attempt to quit:     Years since quittin 9   Smokeless Tobacco Never Used       Current Medications  Current Outpatient Medications   Medication Sig Dispense Refill    Cholecalciferol (VITAMIN D PO) Take by mouth      Coenzyme Q10 (CO Q 10 PO) Take 200 mg by mouth      diphenhydrAMINE (BENADRYL) 50 mg capsule Take 50 mg by mouth every 6 (six) hours as needed for itching      esomeprazole (NexIUM) 40 MG capsule Take 1 capsule (40 mg total) by mouth daily 90 capsule 1    simvastatin (ZOCOR) 40 mg tablet Take 1 tablet (40 mg total) by mouth daily at bedtime 90 tablet 1    amoxicillin-clavulanate (AUGMENTIN) 875-125 mg per tablet Take 1 tablet by mouth every 12 (twelve) hours for 10 days 20 tablet 0    tamsulosin (FLOMAX) 0 4 mg Take 1 capsule (0 4 mg total) by mouth daily with dinner 30 capsule 11     No current facility-administered medications for this visit  Allergies  No Known Allergies    Past Medical History, Social History, Family History, medications and allergies were reviewed  Vitals  Vitals:    12/16/19 0855   BP: 124/80   Pulse: 76   Weight: 96 6 kg (213 lb)   Height: 5' 9" (1 753 m)       Physical Exam  Physical Exam   Constitutional: He is oriented to person, place, and time  He appears well-developed and well-nourished  Cardiovascular: Normal rate  Pulmonary/Chest: Effort normal    Abdominal: Soft  Genitourinary: Penis normal    Musculoskeletal: Normal range of motion  Neurological: He is alert and oriented to person, place, and time  Skin: Skin is warm, dry and intact  Psychiatric: He has a normal mood and affect  Vitals reviewed          Results  Lab Results   Component Value Date    PSA 1 9 10/03/2019    PSA 0 4 01/16/2017     Lab Results   Component Value Date    GLUCOSE 105 03/11/2015    CALCIUM 9 6 10/03/2019     03/11/2015    K 4 4 10/03/2019    CO2 28 10/03/2019     10/03/2019    BUN 17 10/03/2019    CREATININE 0 89 10/03/2019     Lab Results   Component Value Date    WBC 9 03 10/03/2019    HGB 16 1 10/03/2019    HCT 50 4 (H) 10/03/2019    MCV 99 (H) 10/03/2019     10/03/2019

## 2019-12-16 NOTE — PROGRESS NOTES
12/16/2019    Giuliano Gonzalez  1950  [id number]        Assessment  History of urethral stricture  Recurrent UTI  Peyronie's plaque status post plication    Plan  We discussed today's findings and his recent urologic history  He is quite frustrated by symptoms and believes that there must be a cause  I explained that he is experiencing frequent urinary tract infections, which are likely related to colonization at this point  It seems that this initially developed after his procedure in July  He has been struggling with symptoms since  For now I have prescribed Augmentin once again and will culture the urine  We also decided to start tamsulosin to improve his urinary symptoms  Risks and benefits of this medication discussed  He understands potential lightheadedness, retrograde ejaculation, other less common potential side effects  He will follow up in the next 4 to 6 weeks to re-evaluate  Will check uroflow and postvoid residual again at that time  I have also recommended Cystex over-the-counter urinary health formula for long-term infection prevention  Total visit time was 25 minutes of which over 50% was spent on counseling  History of Present Illness  Jimmy Chou is a 77 y o  male with history of urethral stricture status post DVIU in 2014 with Dr Quijano Mercer  He reports that he had done well since then until this July 2019  At that time, he underwent plication surgery for Peyronie's plaque at [facility]  Cystoscopy was performed at the time to evaluate his urethral stricture, and red rubber catheter was placed during his surgery  Subsequently developed numerous urinary tract infections, approximately every 3 weeks and he has been on multiple antibiotics  He has been found to have ESBL E coli  He is very frustrated by this  He was recently treated about 10 days ago with Augmentin, and reports recurrent symptoms of frequency, urgency, and cloudy urine now    On 9/13/2019 I performed cystoscopy in the office which revealed a slight recurrent bulbar stricture which was easily passable with the flexible cystoscope  At the time postvoid residual was 98 mL  He wished to be seen and evaluated once again for stricture due to complaint of worsening symptoms as mentioned above  He is concerned that his stricture has recurred  Cystoscopy  Date/Time: 12/16/2019 9:50 AM  Performed by: Andria Martinez MD  Authorized by: Andria Martinez MD     Additional Procedure Details: Flexible cystoscopy note     The patient was prepped and draped in sterile fashion  2% lidocaine jelly was instilled per urethra   The 16 [place] flexible cystoscope was placed per urethra  Location of stricture was identified at the bulbar urethra  Once again, this is easily passable with flexible cystoscope  There is some prostate enlargement with partial occlusion of the bladder outlet  Evaluation of the bladder reveals bilateral normal ureteral orifices   There is no evidence of urothelial carcinoma or suspicious lesion   The scope was removed and the procedure was terminated  The patient tolerated the procedure well  He emptied his bladder afterward and bladder scan postvoid residual revealed 0 mL  Review of Systems  Review of Systems   Constitutional: Negative  HENT: Negative  Respiratory: Negative  Cardiovascular: Negative  Gastrointestinal: Negative  Genitourinary:        As per HPI   Musculoskeletal: Negative  Skin: Negative  Neurological: Negative  Hematological: Negative            Past Medical History  Past Medical History:   Diagnosis Date    AC joint arthropathy     BPH (benign prostatic hyperplasia)     Carpal tunnel syndrome     bilateral    Chronic prostatitis     last assessed: 3/1/2014    Ganglion     last assessed: 10/7/2013    GERD without esophagitis     Hyperlipidemia     Impingement syndrome of right shoulder     last assessed: 3/22/2016    Left inguinal hernia     description: repaired by Dr Malini Fox Peyronie's disease     Pneumonia of left upper lobe due to infectious organism [facility])     last assessed: 2016    Sexual dysfunction     Stomach disorder        Past Social History  Past Surgical History:   Procedure Laterality Date    COLONOSCOPY      COLONOSCOPY      CYSTOSCOPY      Diagnostic last assessed: 2014    CYSTOSTOMY      urethral stricture    HERNIA REPAIR      NEUROPLASTY / TRANSPOSITION MEDIAN NERVE AT CARPAL TUNNEL      TX ANKLE SCOPE,PLANTAR FASCIOTOMY Left 2016    Procedure: PLANTAR FASCIOTOMY;  Surgeon: Ewa Mccartney DPM;  [facility];  Service: Podiatry    TX EXCIS PRIMARY GANGLION WRIST Left 2018    Procedure: WRIST RESECTION VOLAR GANGLION CYST;  Surgeon: Jordon Ryan MD;  [facility];  Service: Orthopedics    TX REVISE MEDIAN N/CARPAL TUNNEL SURG Right 2016    Procedure: CARPAL TUNNEL RELEASE ;  Surgeon: Jordon Ryan MD;  [facility];  Service: Orthopedics    TX REVISE MEDIAN N/CARPAL TUNNEL SURG Left 2018    Procedure: RELEASE CARPAL TUNNEL;  Surgeon: Jordon Ryan MD;  [facility];  Service: Orthopedics       Past Family History  Family History   Problem Relation Age of Onset    Cancer Father         bladder       Past Social history  Social History     Socioeconomic History    Marital status: /Civil Union     Spouse name: Not on file    Number of children: Not on file    Years of education: Not on file    Highest education level: Not on file   Occupational History    Occupation: Working full time    Social Needs    Financial resource strain: Not on file    Food insecurity:     Worry: Not on file     Inability: Not on file   [employer] needs:     Medical: Not on file     Non-medical: Not on file   Tobacco Use    Smoking status: Former Smoker     Last attempt to quit:      Years since quittin 9    Smokeless tobacco: Never Used Substance and Sexual Activity    Alcohol use: Yes     Comment: social and denies alcohol use causing problems     Drug use: No    Sexual activity: Not on file   Lifestyle    Physical activity:     Days per week: Not on file     Minutes per session: Not on file    Stress: Not on file   Relationships    Social connections:     Talks on phone: Not on file     Gets together: Not on file     Attends Anabaptism service: Not on file     Active member of club or organization: Not on file     Attends meetings of clubs or organizations: Not on file     Relationship status: Not on file    Intimate partner violence:     Fear of current or ex partner: Not on file     Emotionally abused: Not on file     Physically abused: Not on file     Forced sexual activity: Not on file   Other Topics Concern    Not on file   Social History Narrative    Not on file     Social History     Tobacco Use   Smoking Status Former Smoker    Last attempt to quit:     Years since quittin 9   Smokeless Tobacco Never Used       Current Medications  Current Outpatient Medications   Medication Sig Dispense Refill    Cholecalciferol (VITAMIN D PO) Take by mouth      Coenzyme Q10 (CO Q 10 PO) Take 200 mg by mouth      diphenhydrAMINE (BENADRYL) 50 mg capsule Take 50 mg by mouth every 6 (six) hours as needed for itching      esomeprazole (NexIUM) 40 MG capsule Take 1 capsule (40 mg total) by mouth daily 90 capsule 1    simvastatin (ZOCOR) 40 mg tablet Take 1 tablet (40 mg total) by mouth daily at bedtime 90 tablet 1    amoxicillin-clavulanate (AUGMENTIN) 875-125 mg per tablet Take 1 tablet by mouth every 12 (twelve) hours for 10 days 20 tablet 0    tamsulosin (FLOMAX) 0 4 mg Take 1 capsule (0 4 mg total) by mouth daily with dinner 30 capsule 11     No current facility-administered medications for this visit          Allergies  No Known Allergies    Past Medical History, Social History, Family History, medications and allergies were reviewed  Vitals  Vitals:    12/16/19 0855   BP: 124/80   Pulse: 76   Weight: 96 6 kg (213 lb)   Height: 5' 9" (1 753 m)       Physical Exam  Physical Exam   Constitutional: He is oriented to person, place, and time  He appears well-developed and well-nourished  Cardiovascular: Normal rate  Pulmonary/Chest: Effort normal    Abdominal: Soft  Genitourinary: Penis normal    Musculoskeletal: Normal range of motion  Neurological: He is alert and oriented to person, place, and time  Skin: Skin is warm, dry and intact  Psychiatric: He has a normal mood and affect  Vitals reviewed          Results  Lab Results   Component Value Date    PSA 1 9 10/03/2019    PSA 0 4 01/16/2017     Lab Results   Component Value Date    GLUCOSE 105 03/11/2015    CALCIUM 9 6 10/03/2019     03/11/2015    K 4 4 10/03/2019    CO2 28 10/03/2019     10/03/2019    BUN 17 10/03/2019    CREATININE 0 89 10/03/2019     Lab Results   Component Value Date    WBC 9 03 10/03/2019    HGB 16 1 10/03/2019    HCT 50 4 (H) 10/03/2019    MCV 99 (H) 10/03/2019     10/03/2019

## 2019-12-19 LAB — BACTERIA UR CULT: ABNORMAL

## 2020-01-22 NOTE — PROGRESS NOTES
1/23/2020    Simon Nones  1950  [id number]      Assessment  -Urethral stricture s/p DVIU (2014)  -Recurrent urinary tract infections  -Peyronie's disease s/p plication surgery (2/6820)    Discussion/Plan  Beto Kelley is a 77 y o  male being managed by Dr Uri Lofton        -PVR is 131 mL  -Urine dip in office today identifies positive nitrate, trace blood, and leukocytes  We did discuss possibility that patient is likely colonized with bacteria given his recurrent urinary tract infections  However, patient is requesting specimen be sent for urine culture  Advised patient that someone from office will call with results  We will hold off on prescribing an antibiotic at this time  Recurrent urinary tract infections likely secondary to incomplete bladder emptying  Recommend continuing alpha-blocker, however patient wishes to try an alternative medication as he reports side effect of retrograde ejaculation from tamsulosin  Prescription for alfuzosin was electronically sent to his pharmacy  We also briefly discussed underlying performing intermittent catheterization to ensure complete bladder emptying and maintain patency with history of urethral stricture  He is not interested in this option at this time  Recommend obtaining a renal ultrasound for assessment given his recurrent UTIs  -Patient will return in 4-6 weeks to review results of imaging, and reassess his urinary pattern with PVR  He was instructed to call with any issues  -All questions answered, patient agrees with plan      History of Present Illness  77 y o  male with a history of urethral stricture, recurrent UTI, and peyronie's disease presents today for follow up  Patient underwent cystoscopic evaluation on 12/16/19, which identified bulbar urethral stricture  He was prescribed Augmentin for urinary tract infection, and started on tamsulosin 0 4mg HS    Patient reports side effect of retrograde ejaculation, and would like to discuss an alternative medication  He continues to report foul-smelling, cloudy appearing urine  His last urine culture 12/16/2019 identified ESBL E coli organism, resistant to cephalosporins and sulfa  Patient denies any additional lower urinary tract symptoms, gross hematuria, or dysuria  He underwent plication surgery for Peyronie's at [address] in July 2019  Since that time, patient has experience recurrent urinary tract infections  Last PSA 10/03/2019 was 1 9  Review of Systems  Review of Systems   Constitutional: Negative  HENT: Negative  Respiratory: Negative  Cardiovascular: Negative  Gastrointestinal: Negative  Genitourinary: Negative for decreased urine volume, difficulty urinating, dysuria, flank pain, frequency, hematuria and urgency  Musculoskeletal: Negative  Skin: Negative  Neurological: Negative  Psychiatric/Behavioral: Negative          Past Medical History  Past Medical History:   Diagnosis Date    AC joint arthropathy     BPH (benign prostatic hyperplasia)     Carpal tunnel syndrome     bilateral    Chronic prostatitis     last assessed: 3/1/2014    Ganglion     last assessed: 10/7/2013    GERD without esophagitis     Hyperlipidemia     Impingement syndrome of right shoulder     last assessed: 3/22/2016    Left inguinal hernia     description: repaired by Dr Brennan Noguera Peyronie's disease     Pneumonia of left upper lobe due to infectious organism [facility])     last assessed: 2/8/2016    Sexual dysfunction     Stomach disorder        Past Social History  Past Surgical History:   Procedure Laterality Date    COLONOSCOPY      COLONOSCOPY      CYSTOSCOPY      Diagnostic last assessed: 6/12/2014    CYSTOSTOMY      urethral stricture    HERNIA REPAIR      NEUROPLASTY / TRANSPOSITION MEDIAN NERVE AT CARPAL TUNNEL      NV ANKLE SCOPE,PLANTAR FASCIOTOMY Left 11/18/2016    Procedure: PLANTAR FASCIOTOMY;  Surgeon: Hermila Camarillo DPM;  [facility];  Service: Podiatry    ME EXCIS PRIMARY GANGLION WRIST Left 2018    Procedure: WRIST RESECTION VOLAR GANGLION CYST;  Surgeon: Erik Luna MD;  [facility];  Service: Orthopedics    ME REVISE MEDIAN N/CARPAL TUNNEL SURG Right 2016    Procedure: CARPAL TUNNEL RELEASE ;  Surgeon: Erik Luna MD;  [facility];  Service: Orthopedics    ME REVISE MEDIAN N/CARPAL TUNNEL SURG Left 2018    Procedure: RELEASE CARPAL TUNNEL;  Surgeon: Erik Luna MD;  [facility];  Service: Orthopedics       Past Family History  Family History   Problem Relation Age of Onset    Cancer Father         bladder       Past Social history  Social History     Socioeconomic History    Marital status: /Civil Union     Spouse name: Not on file    Number of children: Not on file    Years of education: Not on file    Highest education level: Not on file   Occupational History    Occupation: Working full time    Social Needs    Financial resource strain: Not on file    Food insecurity:     Worry: Not on file     Inability: Not on file   [employer] needs:     Medical: Not on file     Non-medical: Not on file   Tobacco Use    Smoking status: Former Smoker     Last attempt to quit: 2014     Years since quittin 0    Smokeless tobacco: Never Used   Substance and Sexual Activity    Alcohol use: Yes     Comment: social and denies alcohol use causing problems     Drug use: No    Sexual activity: Not on file   Lifestyle    Physical activity:     Days per week: Not on file     Minutes per session: Not on file    Stress: Not on file   Relationships    Social connections:     Talks on phone: Not on file     Gets together: Not on file     Attends Buddhism service: Not on file     Active member of club or organization: Not on file     Attends meetings of clubs or organizations: Not on file     Relationship status: Not on file    Intimate partner violence:     Fear of current or ex partner: Not on file     Emotionally abused: Not on file     Physically abused: Not on file     Forced sexual activity: Not on file   Other Topics Concern    Not on file   Social History Narrative    Not on file       Current Medications  Current Outpatient Medications   Medication Sig Dispense Refill    Cholecalciferol (VITAMIN D PO) Take by mouth      Coenzyme Q10 (CO Q 10 PO) Take 200 mg by mouth      diphenhydrAMINE (BENADRYL) 50 mg capsule Take 50 mg by mouth every 6 (six) hours as needed for itching      esomeprazole (NexIUM) 40 MG capsule Take 1 capsule (40 mg total) by mouth daily 90 capsule 1    simvastatin (ZOCOR) 40 mg tablet Take 1 tablet (40 mg total) by mouth daily at bedtime 90 tablet 1    tamsulosin (FLOMAX) 0 4 mg Take 1 capsule (0 4 mg total) by mouth daily with dinner 30 capsule 11     No current facility-administered medications for this visit  Allergies  No Known Allergies    Past Medical History, Social History, Family History, medications and allergies were reviewed  Vitals  There were no vitals filed for this visit  Physical Exam  Physical Exam   Constitutional: He is oriented to person, place, and time  He appears well-developed and well-nourished  HENT:   Head: Normocephalic  Eyes: Pupils are equal, round, and reactive to light  Neck: Normal range of motion  Cardiovascular: Normal rate and regular rhythm  Pulmonary/Chest: Effort normal    Abdominal: Soft  Normal appearance  He exhibits no distension  There is no tenderness  There is no CVA tenderness  Musculoskeletal: Normal range of motion  Neurological: He is alert and oriented to person, place, and time  Skin: Skin is warm and dry  Psychiatric: He has a normal mood and affect   His behavior is normal  Judgment and thought content normal        Results    I have personally reviewed all pertinent lab results and reviewed with patient  Lab Results   Component Value Date    PSA 1 9 10/03/2019    PSA 0 4 01/16/2017     Lab Results   Component Value Date    GLUCOSE 105 03/11/2015    CALCIUM 9 6 10/03/2019     03/11/2015    K 4 4 10/03/2019    CO2 28 10/03/2019     10/03/2019    BUN 17 10/03/2019    CREATININE 0 89 10/03/2019     Lab Results   Component Value Date    WBC 9 03 10/03/2019    HGB 16 1 10/03/2019    HCT 50 4 (H) 10/03/2019    MCV 99 (H) 10/03/2019     10/03/2019     No results found for this or any previous visit (from the past 1 hour(s))

## 2020-01-23 ENCOUNTER — OFFICE VISIT (OUTPATIENT)
Dept: UROLOGY | Facility: AMBULATORY SURGERY CENTER | Age: 70
End: 2020-01-23
Payer: MEDICARE

## 2020-01-23 VITALS
SYSTOLIC BLOOD PRESSURE: 122 MMHG | DIASTOLIC BLOOD PRESSURE: 82 MMHG | WEIGHT: 213 LBS | BODY MASS INDEX: 31.55 KG/M2 | HEART RATE: 82 BPM | HEIGHT: 69 IN

## 2020-01-23 DIAGNOSIS — N40.1 BPH WITH OBSTRUCTION/LOWER URINARY TRACT SYMPTOMS: Primary | ICD-10-CM

## 2020-01-23 DIAGNOSIS — N39.0 RECURRENT UTI: ICD-10-CM

## 2020-01-23 DIAGNOSIS — N13.8 BPH WITH OBSTRUCTION/LOWER URINARY TRACT SYMPTOMS: Primary | ICD-10-CM

## 2020-01-23 DIAGNOSIS — Z87.448 H/O URETHRAL STRICTURE: ICD-10-CM

## 2020-01-23 LAB
POST-VOID RESIDUAL VOLUME, ML POC: 131 ML
SL AMB  POCT GLUCOSE, UA: NORMAL
SL AMB LEUKOCYTE ESTERASE,UA: NORMAL
SL AMB POCT BILIRUBIN,UA: NORMAL
SL AMB POCT BLOOD,UA: NORMAL
SL AMB POCT CLARITY,UA: NORMAL
SL AMB POCT COLOR,UA: YELLOW
SL AMB POCT KETONES,UA: NORMAL
SL AMB POCT NITRITE,UA: POSITIVE
SL AMB POCT PH,UA: 6.5
SL AMB POCT SPECIFIC GRAVITY,UA: 1.02
SL AMB POCT URINE PROTEIN: NORMAL
SL AMB POCT UROBILINOGEN: 0.2

## 2020-01-23 PROCEDURE — 81001 URINALYSIS AUTO W/SCOPE: CPT | Performed by: NURSE PRACTITIONER

## 2020-01-23 PROCEDURE — 81002 URINALYSIS NONAUTO W/O SCOPE: CPT | Performed by: NURSE PRACTITIONER

## 2020-01-23 PROCEDURE — 87186 SC STD MICRODIL/AGAR DIL: CPT | Performed by: NURSE PRACTITIONER

## 2020-01-23 PROCEDURE — 99214 OFFICE O/P EST MOD 30 MIN: CPT | Performed by: NURSE PRACTITIONER

## 2020-01-23 PROCEDURE — 87077 CULTURE AEROBIC IDENTIFY: CPT | Performed by: NURSE PRACTITIONER

## 2020-01-23 PROCEDURE — 51798 US URINE CAPACITY MEASURE: CPT | Performed by: NURSE PRACTITIONER

## 2020-01-23 PROCEDURE — 87181 SC STD AGAR DILUTION PER AGT: CPT | Performed by: NURSE PRACTITIONER

## 2020-01-23 PROCEDURE — 87086 URINE CULTURE/COLONY COUNT: CPT | Performed by: NURSE PRACTITIONER

## 2020-01-23 RX ORDER — ALFUZOSIN HYDROCHLORIDE 10 MG/1
10 TABLET, EXTENDED RELEASE ORAL DAILY
Qty: 30 TABLET | Refills: 3 | Status: SHIPPED | OUTPATIENT
Start: 2020-01-23 | End: 2020-05-18 | Stop reason: SDUPTHER

## 2020-01-24 LAB
BACTERIA UR QL AUTO: ABNORMAL /HPF
BILIRUB UR QL STRIP: NEGATIVE
CLARITY UR: ABNORMAL
COLOR UR: YELLOW
GLUCOSE UR STRIP-MCNC: NEGATIVE MG/DL
HGB UR QL STRIP.AUTO: NEGATIVE
KETONES UR STRIP-MCNC: NEGATIVE MG/DL
LEUKOCYTE ESTERASE UR QL STRIP: ABNORMAL
NITRITE UR QL STRIP: POSITIVE
NON-SQ EPI CELLS URNS QL MICRO: ABNORMAL /HPF
PH UR STRIP.AUTO: 6.5 [PH]
PROT UR STRIP-MCNC: NEGATIVE MG/DL
RBC #/AREA URNS AUTO: ABNORMAL /HPF
SP GR UR STRIP.AUTO: 1.02 (ref 1–1.03)
UROBILINOGEN UR QL STRIP.AUTO: 0.2 E.U./DL
WBC #/AREA URNS AUTO: ABNORMAL /HPF

## 2020-01-26 LAB — BACTERIA UR CULT: ABNORMAL

## 2020-01-27 ENCOUNTER — TELEPHONE (OUTPATIENT)
Dept: UROLOGY | Facility: AMBULATORY SURGERY CENTER | Age: 70
End: 2020-01-27

## 2020-01-27 DIAGNOSIS — N39.0 URINARY TRACT INFECTION WITHOUT HEMATURIA, SITE UNSPECIFIED: Primary | ICD-10-CM

## 2020-01-27 RX ORDER — AMOXICILLIN AND CLAVULANATE POTASSIUM 875; 125 MG/1; MG/1
1 TABLET, FILM COATED ORAL EVERY 12 HOURS SCHEDULED
Qty: 14 TABLET | Refills: 0 | Status: SHIPPED | OUTPATIENT
Start: 2020-01-27 | End: 2020-02-03

## 2020-01-27 NOTE — TELEPHONE ENCOUNTER
----- Message from Noe Ledezma sent at 1/26/2020 11:01 AM EST -----  Regarding: Test Results Question  Contact: [phone]  Ms Bill Gandara  I am set up for the ultrasound on my kidneys and bladder on Tuesday at 1:00  I just read the urine culture report and as I suspected I have another bladder infection  Should antibiotics be prescribed in addition to the ultrasound ? If so, send script to the [facility] on [address] [facility] in [address] [place]  Please advise course of action  Thank you

## 2020-01-27 NOTE — TELEPHONE ENCOUNTER
Just received results of final sensitivity  [facility]  Will review results of renal ultrasound and discuss surgical intervention for BPH at upcoming office visit

## 2020-01-27 NOTE — TELEPHONE ENCOUNTER
Patient managed by Dr Urvashi Quiroz seen in [place]  History of BPH with obstruction/lower urinary tract symptoms, recurrent UTIs, urethral stricture s/p DVIU (2014), and Peyronie's Disease s/p plication surgery  (7/2019)  Provider did discuss possible colonization due to recurrent UTIs as last appointment on 1/23/2020 and that recurrent UTIs likely due to incomplete bladder emptying  Patient was prescribed Alfuzosin  Patient has US of kidneys and bladder on 01/28/2020  Patient contacted office to report that he read the urine culture results and suspect he has another bladder infection  He is questioning if he should be prescribed antibiotics in addiction to the [place]? If so, he would like the prescription to be sent to [facility] on [facility] in [place]  Routing to Provider to review and advise

## 2020-01-27 NOTE — TELEPHONE ENCOUNTER
Pt called in and relayed message on the medication being called in and that we will go over Salvatore Montana at next appt  Pt understood

## 2020-01-28 ENCOUNTER — HOSPITAL ENCOUNTER (OUTPATIENT)
Dept: ULTRASOUND IMAGING | Facility: CLINIC | Age: 70
Discharge: HOME | End: 2020-01-28
Payer: MEDICARE

## 2020-01-28 DIAGNOSIS — N39.0 RECURRENT UTI: ICD-10-CM

## 2020-01-28 PROCEDURE — 51798 US URINE CAPACITY MEASURE: CPT

## 2020-02-03 NOTE — TELEPHONE ENCOUNTER
Patient to be scheduled for appointment with [facility] to discuss renal ultrasound results and to discuss surgical intervention such as Urolift vs  TURP  LMOM to call office at [phone] at earliest convenience

## 2020-02-03 NOTE — TELEPHONE ENCOUNTER
Received results of recent renal ultrasound  It appears patient had cancelled his upcoming appointment  Please assist patient with rescheduling  Dr Paola Celis had recommend discussing surgical intervention such as Urolift vs  TURP

## 2020-02-07 ENCOUNTER — OFFICE VISIT (OUTPATIENT)
Dept: UROLOGY | Facility: AMBULATORY SURGERY CENTER | Age: 70
End: 2020-02-07
Payer: MEDICARE

## 2020-02-07 VITALS
HEART RATE: 82 BPM | HEIGHT: 69 IN | DIASTOLIC BLOOD PRESSURE: 82 MMHG | SYSTOLIC BLOOD PRESSURE: 122 MMHG | WEIGHT: 213 LBS | BODY MASS INDEX: 31.55 KG/M2

## 2020-02-07 DIAGNOSIS — N40.1 BPH WITH OBSTRUCTION/LOWER URINARY TRACT SYMPTOMS: Primary | ICD-10-CM

## 2020-02-07 DIAGNOSIS — N39.0 RECURRENT UTI: ICD-10-CM

## 2020-02-07 DIAGNOSIS — N35.819 OTHER URETHRAL STRICTURE, MALE, UNSPECIFIED SITE: ICD-10-CM

## 2020-02-07 DIAGNOSIS — N13.8 BPH WITH OBSTRUCTION/LOWER URINARY TRACT SYMPTOMS: Primary | ICD-10-CM

## 2020-02-07 DIAGNOSIS — N48.6 PEYRONIE'S DISEASE: ICD-10-CM

## 2020-02-07 LAB — POST-VOID RESIDUAL VOLUME, ML POC: 134 ML

## 2020-02-07 PROCEDURE — 99214 OFFICE O/P EST MOD 30 MIN: CPT | Performed by: NURSE PRACTITIONER

## 2020-02-07 PROCEDURE — 3008F BODY MASS INDEX DOCD: CPT | Performed by: NURSE PRACTITIONER

## 2020-02-07 PROCEDURE — 51798 US URINE CAPACITY MEASURE: CPT | Performed by: NURSE PRACTITIONER

## 2020-02-07 PROCEDURE — 51741 ELECTRO-UROFLOWMETRY FIRST: CPT | Performed by: NURSE PRACTITIONER

## 2020-02-07 PROCEDURE — 1036F TOBACCO NON-USER: CPT | Performed by: NURSE PRACTITIONER

## 2020-02-07 PROCEDURE — 1160F RVW MEDS BY RX/DR IN RCRD: CPT | Performed by: NURSE PRACTITIONER

## 2020-02-07 PROCEDURE — 4040F PNEUMOC VAC/ADMIN/RCVD: CPT | Performed by: NURSE PRACTITIONER

## 2020-02-07 NOTE — PROGRESS NOTES
2/7/2020    Judie Loredo  1950  [id number]      Assessment  -Urethral stricture s/p DVIU (2014)  -Recurrent urinary tract infections  -BPH with lower urinary tract symptoms  -Peyronie's disease s/p plication surgery (8/7827)    Discussion/Plan  Alyssa Chaney is a 77 y o  male being managed by Dr Hortensia Gray        -PVR is 134 mL  Total uroflow volume voided 28 9 mL, average flow 4 7 mL/s  We had a long discussion regarding surgical intervention for BPH given his history of recurrent urinary tract infections  TURP verses UroLift was recommended by Dr Hortensia Gray   We reviewed both surgical interventions including risks and benefits  Provided patient with educational brochure  At this time, he wishes to continue alfuzosin and consider both options  He will return in 4 months to re-evaluate his urinary pattern and assess PVR/uroflow  Patient was instructed to call with any issues  -All questions answered, patient agrees with plan      History of Present Illness  77 y o  male with a history of urethral stricture, recurrent UTI, BPH with LUTS, and peyronie's disease presents today for follow up  Patient underwent cystoscopic evaluation on 12/16/2019 which identified BPH with partial occlusion of bladder outlet and stricture at the bulbar urethra  His most recent urinary tract infection was on 01/23/2020  He has had numerous UTIs over the last few months  Patient was started on alfuzosin at last office visit as he did not like side effect of retrograde ejaculation with tamsulosin  He reports a slight improvement in his urinary pattern and ejaculation  Patient is currently asymptomatic  Review of Systems  Review of Systems   Constitutional: Negative  HENT: Negative  Respiratory: Negative  Cardiovascular: Negative  Gastrointestinal: Negative  Genitourinary: Negative for decreased urine volume, difficulty urinating, dysuria, flank pain, frequency, hematuria and urgency  Musculoskeletal: Negative  Skin: Negative  Neurological: Negative  Psychiatric/Behavioral: Negative          Past Medical History  Past Medical History:   Diagnosis Date    AC joint arthropathy     BPH (benign prostatic hyperplasia)     Carpal tunnel syndrome     bilateral    Chronic prostatitis     last assessed: 3/1/2014    Ganglion     last assessed: 10/7/2013    GERD without esophagitis     Hyperlipidemia     Impingement syndrome of right shoulder     last assessed: 3/22/2016    Left inguinal hernia     description: repaired by Dr Jazzy Posey Peyronie's disease     Pneumonia of left upper lobe due to infectious organism [facility])     last assessed: 2/8/2016    Sexual dysfunction     Stomach disorder        Past Social History  Past Surgical History:   Procedure Laterality Date    COLONOSCOPY      COLONOSCOPY      CYSTOSCOPY      Diagnostic last assessed: 6/12/2014    CYSTOSTOMY      urethral stricture    HERNIA REPAIR      NEUROPLASTY / TRANSPOSITION MEDIAN NERVE AT CARPAL TUNNEL      [place] ANKLE SCOPE,PLANTAR FASCIOTOMY Left 11/18/2016    Procedure: PLANTAR FASCIOTOMY;  Surgeon: Vargas Cyr DPM;  [facility];  Service: Podiatry    [place] EXCIS PRIMARY GANGLION WRIST Left 1/24/2018    Procedure: WRIST RESECTION VOLAR GANGLION CYST;  Surgeon: Raheem Beckham MD;  [facility];  Service: Orthopedics    [place] REVISE MEDIAN N/CARPAL TUNNEL SURG Right 8/24/2016    Procedure: CARPAL TUNNEL RELEASE ;  Surgeon: Raheem Beckham MD;  [facility];  Service: Orthopedics    [place] REVISE MEDIAN N/CARPAL TUNNEL SURG Left 1/24/2018    Procedure: RELEASE CARPAL TUNNEL;  Surgeon: Raheem Beckham MD;  [facility];  Service: Orthopedics       Past Family History  Family History   Problem Relation Age of Onset    Cancer Father         bladder       Past Social history  Social History     Socioeconomic History    Marital status: /Civil Union     Spouse name: Not on file    Number of children: Not on file    Years of education: Not on file    Highest education level: Not on file   Occupational History    Occupation: Working full time    Social Needs    Financial resource strain: Not on file    Food insecurity:     Worry: Not on file     Inability: Not on file   Wunsch-Brautkleid needs:     Medical: Not on file     Non-medical: Not on file   Tobacco Use    Smoking status: Former Smoker     Last attempt to quit:      Years since quittin 1    Smokeless tobacco: Never Used   Substance and Sexual Activity    Alcohol use: Yes     Comment: social and denies alcohol use causing problems     Drug use: No    Sexual activity: Not on file   Lifestyle    Physical activity:     Days per week: Not on file     Minutes per session: Not on file    Stress: Not on file   Relationships    Social connections:     Talks on phone: Not on file     Gets together: Not on file     Attends Adventism service: Not on file     Active member of club or organization: Not on file     Attends meetings of clubs or organizations: Not on file     Relationship status: Not on file    Intimate partner violence:     Fear of current or ex partner: Not on file     Emotionally abused: Not on file     Physically abused: Not on file     Forced sexual activity: Not on file   Other Topics Concern    Not on file   Social History Narrative    Not on file       Current Medications  Current Outpatient Medications   Medication Sig Dispense Refill    alfuzosin (UROXATRAL) 10 mg 24 hr tablet Take 1 tablet (10 mg total) by mouth daily 30 tablet 3    Cholecalciferol (VITAMIN D PO) Take by mouth      Coenzyme Q10 (CO Q 10 PO) Take 200 mg by mouth      diphenhydrAMINE (BENADRYL) 50 mg capsule Take 50 mg by mouth every 6 (six) hours as needed for itching      esomeprazole (NexIUM) 40 MG capsule Take 1 capsule (40 mg total) by mouth daily 90 capsule 1    simvastatin (ZOCOR) 40 mg tablet Take 1 tablet (40 mg total) by mouth daily at bedtime 90 tablet 1     No current facility-administered medications for this visit  Allergies  No Known Allergies    Past Medical History, Social History, Family History, medications and allergies were reviewed  Vitals  Vitals:    02/07/20 0904   BP: 122/82   BP Location: Left arm   Patient Position: Sitting   Cuff Size: Adult   Pulse: 82   Weight: 96 6 kg (213 lb)   Height: 5' 9" (1 753 m)       Physical Exam  Physical Exam   Constitutional: He is oriented to person, place, and time  He appears well-developed and well-nourished  HENT:   Head: Normocephalic  Eyes: Pupils are equal, round, and reactive to light  Neck: Normal range of motion  Cardiovascular: Normal rate and regular rhythm  Pulmonary/Chest: Effort normal    Abdominal: Soft  Normal appearance  He exhibits no distension  There is no tenderness  There is no CVA tenderness  Musculoskeletal: Normal range of motion  Neurological: He is alert and oriented to person, place, and time  Skin: Skin is warm and dry  Psychiatric: He has a normal mood and affect   His behavior is normal  Judgment and thought content normal        Results    I have personally reviewed all pertinent lab results and reviewed with patient  Lab Results   Component Value Date    PSA 1 9 10/03/2019    PSA 0 4 01/16/2017     Lab Results   Component Value Date    GLUCOSE 105 03/11/2015    CALCIUM 9 6 10/03/2019     03/11/2015    K 4 4 10/03/2019    CO2 28 10/03/2019     10/03/2019    BUN 17 10/03/2019    CREATININE 0 89 10/03/2019     Lab Results   Component Value Date    WBC 9 03 10/03/2019    HGB 16 1 10/03/2019    HCT 50 4 (H) 10/03/2019    MCV 99 (H) 10/03/2019     10/03/2019     Recent Results (from the past 1 hour(s))   POCT Measure PVR    Collection Time: 02/07/20  9:04 AM   Result Value Ref Range    POST-VOID RESIDUAL VOLUME, ML  mL

## 2020-05-01 ENCOUNTER — TELEPHONE (OUTPATIENT)
Dept: UROLOGY | Facility: AMBULATORY SURGERY CENTER | Age: 70
End: 2020-05-01

## 2020-05-01 DIAGNOSIS — R30.0 DYSURIA: Primary | ICD-10-CM

## 2020-05-01 DIAGNOSIS — E78.5 HYPERLIPIDEMIA, UNSPECIFIED HYPERLIPIDEMIA TYPE: ICD-10-CM

## 2020-05-01 RX ORDER — SIMVASTATIN 40 MG
40 TABLET ORAL
Qty: 90 TABLET | Refills: 1 | Status: SHIPPED | OUTPATIENT
Start: 2020-05-01 | End: 2020-10-19 | Stop reason: SDUPTHER

## 2020-05-12 ENCOUNTER — APPOINTMENT (OUTPATIENT)
Dept: LAB | Facility: CLINIC | Age: 70
End: 2020-05-12
Payer: MEDICARE

## 2020-05-12 DIAGNOSIS — R30.0 DYSURIA: ICD-10-CM

## 2020-05-12 PROCEDURE — 87186 SC STD MICRODIL/AGAR DIL: CPT

## 2020-05-12 PROCEDURE — 87086 URINE CULTURE/COLONY COUNT: CPT

## 2020-05-12 PROCEDURE — 87077 CULTURE AEROBIC IDENTIFY: CPT

## 2020-05-12 PROCEDURE — 87181 SC STD AGAR DILUTION PER AGT: CPT

## 2020-05-13 RX ORDER — NITROFURANTOIN 25; 75 MG/1; MG/1
100 CAPSULE ORAL 2 TIMES DAILY
Qty: 14 CAPSULE | Refills: 0 | Status: SHIPPED | OUTPATIENT
Start: 2020-05-13 | End: 2020-05-20

## 2020-05-15 LAB — BACTERIA UR CULT: ABNORMAL

## 2020-05-18 DIAGNOSIS — N13.8 BPH WITH OBSTRUCTION/LOWER URINARY TRACT SYMPTOMS: ICD-10-CM

## 2020-05-18 DIAGNOSIS — N40.1 BPH WITH OBSTRUCTION/LOWER URINARY TRACT SYMPTOMS: ICD-10-CM

## 2020-05-18 RX ORDER — ALFUZOSIN HYDROCHLORIDE 10 MG/1
10 TABLET, EXTENDED RELEASE ORAL DAILY
Qty: 30 TABLET | Refills: 0 | Status: SHIPPED | OUTPATIENT
Start: 2020-05-18 | End: 2020-06-10

## 2020-05-29 ENCOUNTER — TELEPHONE (OUTPATIENT)
Dept: UROLOGY | Facility: AMBULATORY SURGERY CENTER | Age: 70
End: 2020-05-29

## 2020-06-01 ENCOUNTER — OFFICE VISIT (OUTPATIENT)
Dept: UROLOGY | Facility: AMBULATORY SURGERY CENTER | Age: 70
End: 2020-06-01
Payer: MEDICARE

## 2020-06-01 VITALS
HEART RATE: 63 BPM | WEIGHT: 227 LBS | DIASTOLIC BLOOD PRESSURE: 68 MMHG | HEIGHT: 69 IN | TEMPERATURE: 95.8 F | SYSTOLIC BLOOD PRESSURE: 138 MMHG | BODY MASS INDEX: 33.62 KG/M2

## 2020-06-01 DIAGNOSIS — N39.0 RECURRENT UTI: ICD-10-CM

## 2020-06-01 DIAGNOSIS — N40.1 BPH WITH OBSTRUCTION/LOWER URINARY TRACT SYMPTOMS: Primary | ICD-10-CM

## 2020-06-01 DIAGNOSIS — R30.0 DYSURIA: ICD-10-CM

## 2020-06-01 DIAGNOSIS — N13.8 BPH WITH OBSTRUCTION/LOWER URINARY TRACT SYMPTOMS: Primary | ICD-10-CM

## 2020-06-01 LAB
POST-VOID RESIDUAL VOLUME, ML POC: 0 ML
SL AMB  POCT GLUCOSE, UA: NORMAL
SL AMB LEUKOCYTE ESTERASE,UA: NORMAL
SL AMB POCT BILIRUBIN,UA: NORMAL
SL AMB POCT BLOOD,UA: NORMAL
SL AMB POCT CLARITY,UA: NORMAL
SL AMB POCT COLOR,UA: NORMAL
SL AMB POCT KETONES,UA: NORMAL
SL AMB POCT NITRITE,UA: NORMAL
SL AMB POCT PH,UA: 6
SL AMB POCT SPECIFIC GRAVITY,UA: 1.02
SL AMB POCT URINE PROTEIN: NORMAL
SL AMB POCT UROBILINOGEN: 0.2

## 2020-06-01 PROCEDURE — 1036F TOBACCO NON-USER: CPT | Performed by: UROLOGY

## 2020-06-01 PROCEDURE — 3008F BODY MASS INDEX DOCD: CPT | Performed by: UROLOGY

## 2020-06-01 PROCEDURE — 51798 US URINE CAPACITY MEASURE: CPT | Performed by: UROLOGY

## 2020-06-01 PROCEDURE — 99214 OFFICE O/P EST MOD 30 MIN: CPT | Performed by: UROLOGY

## 2020-06-01 PROCEDURE — 4040F PNEUMOC VAC/ADMIN/RCVD: CPT | Performed by: UROLOGY

## 2020-06-01 PROCEDURE — 1160F RVW MEDS BY RX/DR IN RCRD: CPT | Performed by: UROLOGY

## 2020-06-01 PROCEDURE — 81002 URINALYSIS NONAUTO W/O SCOPE: CPT | Performed by: UROLOGY

## 2020-06-01 RX ORDER — GENTAMICIN SULFATE 100 MG/100ML
100 INJECTION, SOLUTION INTRAVENOUS ONCE
Status: CANCELLED | OUTPATIENT
Start: 2020-06-23

## 2020-06-05 ENCOUNTER — TELEPHONE (OUTPATIENT)
Dept: UROLOGY | Facility: AMBULATORY SURGERY CENTER | Age: 70
End: 2020-06-05

## 2020-06-08 ENCOUNTER — HOSPITAL ENCOUNTER (OUTPATIENT)
Dept: NON INVASIVE DIAGNOSTICS | Facility: CLINIC | Age: 70
Discharge: HOME | End: 2020-06-08
Payer: MEDICARE

## 2020-06-08 DIAGNOSIS — N13.8 BPH WITH OBSTRUCTION/LOWER URINARY TRACT SYMPTOMS: ICD-10-CM

## 2020-06-08 DIAGNOSIS — N39.0 RECURRENT UTI: ICD-10-CM

## 2020-06-08 DIAGNOSIS — K21.9 GASTROESOPHAGEAL REFLUX DISEASE WITHOUT ESOPHAGITIS: ICD-10-CM

## 2020-06-08 DIAGNOSIS — N40.1 BPH WITH OBSTRUCTION/LOWER URINARY TRACT SYMPTOMS: ICD-10-CM

## 2020-06-08 LAB
ATRIAL RATE: 75 BPM
P AXIS: 57 DEGREES
PR INTERVAL: 152 MS
QRS AXIS: 24 DEGREES
QRSD INTERVAL: 74 MS
QT INTERVAL: 396 MS
QTC INTERVAL: 442 MS
T WAVE AXIS: 26 DEGREES
VENTRICULAR RATE: 75 BPM

## 2020-06-08 PROCEDURE — 93005 ELECTROCARDIOGRAM TRACING: CPT

## 2020-06-08 PROCEDURE — 93010 ELECTROCARDIOGRAM REPORT: CPT | Performed by: INTERNAL MEDICINE

## 2020-06-08 RX ORDER — ESOMEPRAZOLE MAGNESIUM 40 MG/1
40 CAPSULE, DELAYED RELEASE ORAL DAILY
Qty: 90 CAPSULE | Refills: 1 | Status: SHIPPED | OUTPATIENT
Start: 2020-06-08 | End: 2020-12-14 | Stop reason: SDUPTHER

## 2020-06-10 DIAGNOSIS — N40.1 BPH WITH OBSTRUCTION/LOWER URINARY TRACT SYMPTOMS: ICD-10-CM

## 2020-06-10 DIAGNOSIS — N13.8 BPH WITH OBSTRUCTION/LOWER URINARY TRACT SYMPTOMS: ICD-10-CM

## 2020-06-10 RX ORDER — ALFUZOSIN HYDROCHLORIDE 10 MG/1
TABLET, EXTENDED RELEASE ORAL
Qty: 30 TABLET | Refills: 0 | Status: SHIPPED | OUTPATIENT
Start: 2020-06-10 | End: 2020-07-05 | Stop reason: SDUPTHER

## 2020-06-15 ENCOUNTER — TELEPHONE (OUTPATIENT)
Dept: GASTROENTEROLOGY | Facility: CLINIC | Age: 70
End: 2020-06-15

## 2020-06-18 ENCOUNTER — APPOINTMENT (OUTPATIENT)
Dept: LAB | Facility: CLINIC | Age: 70
End: 2020-06-18
Payer: MEDICARE

## 2020-06-18 DIAGNOSIS — N13.8 BPH WITH OBSTRUCTION/LOWER URINARY TRACT SYMPTOMS: ICD-10-CM

## 2020-06-18 DIAGNOSIS — N39.0 RECURRENT UTI: ICD-10-CM

## 2020-06-18 DIAGNOSIS — N40.1 BPH WITH OBSTRUCTION/LOWER URINARY TRACT SYMPTOMS: ICD-10-CM

## 2020-06-18 PROCEDURE — 87086 URINE CULTURE/COLONY COUNT: CPT

## 2020-06-18 PROCEDURE — 87186 SC STD MICRODIL/AGAR DIL: CPT

## 2020-06-18 PROCEDURE — 87077 CULTURE AEROBIC IDENTIFY: CPT

## 2020-06-18 PROCEDURE — U0003 INFECTIOUS AGENT DETECTION BY NUCLEIC ACID (DNA OR RNA); SEVERE ACUTE RESPIRATORY SYNDROME CORONAVIRUS 2 (SARS-COV-2) (CORONAVIRUS DISEASE [COVID-19]), AMPLIFIED PROBE TECHNIQUE, MAKING USE OF HIGH THROUGHPUT TECHNOLOGIES AS DESCRIBED BY CMS-2020-01-R: HCPCS

## 2020-06-18 PROCEDURE — 87181 SC STD AGAR DILUTION PER AGT: CPT

## 2020-06-19 ENCOUNTER — OFFICE VISIT (OUTPATIENT)
Dept: FAMILY MEDICINE CLINIC | Facility: CLINIC | Age: 70
End: 2020-06-19
Payer: MEDICARE

## 2020-06-19 VITALS
HEART RATE: 108 BPM | BODY MASS INDEX: 33.53 KG/M2 | WEIGHT: 226.4 LBS | DIASTOLIC BLOOD PRESSURE: 82 MMHG | HEIGHT: 69 IN | TEMPERATURE: 96.2 F | OXYGEN SATURATION: 95 % | SYSTOLIC BLOOD PRESSURE: 128 MMHG

## 2020-06-19 DIAGNOSIS — N39.0 RECURRENT UTI: ICD-10-CM

## 2020-06-19 DIAGNOSIS — Z01.818 PRE-OPERATIVE EXAM: Primary | ICD-10-CM

## 2020-06-19 LAB — SARS-COV-2 RNA SPEC QL NAA+PROBE: NOT DETECTED

## 2020-06-19 PROCEDURE — 4040F PNEUMOC VAC/ADMIN/RCVD: CPT | Performed by: NURSE PRACTITIONER

## 2020-06-19 PROCEDURE — 99214 OFFICE O/P EST MOD 30 MIN: CPT | Performed by: NURSE PRACTITIONER

## 2020-06-19 PROCEDURE — 3008F BODY MASS INDEX DOCD: CPT | Performed by: NURSE PRACTITIONER

## 2020-06-19 PROCEDURE — 1160F RVW MEDS BY RX/DR IN RCRD: CPT | Performed by: NURSE PRACTITIONER

## 2020-06-19 PROCEDURE — 1036F TOBACCO NON-USER: CPT | Performed by: NURSE PRACTITIONER

## 2020-06-22 ENCOUNTER — ANESTHESIA EVENT (OUTPATIENT)
Dept: PERIOP | Facility: HOSPITAL | Age: 70
End: 2020-06-22
Payer: MEDICARE

## 2020-06-23 ENCOUNTER — TELEPHONE (OUTPATIENT)
Dept: UROLOGY | Facility: AMBULATORY SURGERY CENTER | Age: 70
End: 2020-06-23

## 2020-06-23 ENCOUNTER — HOSPITAL ENCOUNTER (OUTPATIENT)
Facility: HOSPITAL | Age: 70
Setting detail: OUTPATIENT SURGERY
Discharge: HOME | End: 2020-06-23
Attending: UROLOGY | Admitting: UROLOGY
Payer: MEDICARE

## 2020-06-23 ENCOUNTER — ANESTHESIA (OUTPATIENT)
Dept: PERIOP | Facility: HOSPITAL | Age: 70
End: 2020-06-23
Payer: MEDICARE

## 2020-06-23 VITALS
BODY MASS INDEX: 33.47 KG/M2 | TEMPERATURE: 98 F | RESPIRATION RATE: 16 BRPM | HEART RATE: 86 BPM | WEIGHT: 226 LBS | DIASTOLIC BLOOD PRESSURE: 76 MMHG | SYSTOLIC BLOOD PRESSURE: 142 MMHG | OXYGEN SATURATION: 97 % | HEIGHT: 69 IN

## 2020-06-23 DIAGNOSIS — R35.0 BENIGN PROSTATIC HYPERPLASIA WITH URINARY FREQUENCY: Primary | ICD-10-CM

## 2020-06-23 DIAGNOSIS — N40.1 BENIGN PROSTATIC HYPERPLASIA WITH URINARY FREQUENCY: Primary | ICD-10-CM

## 2020-06-23 PROCEDURE — 52441 CYSTO INSJ TRNSPRSTC 1 IMPLT: CPT | Performed by: UROLOGY

## 2020-06-23 PROCEDURE — L8699 PROSTHETIC IMPLANT NOS: HCPCS | Performed by: UROLOGY

## 2020-06-23 PROCEDURE — 52442 CYSTO INS TRNSPRSTC IMPLT EA: CPT | Performed by: UROLOGY

## 2020-06-23 PROCEDURE — C1769 GUIDE WIRE: HCPCS | Performed by: UROLOGY

## 2020-06-23 DEVICE — IMPLANT URO PROSTATE UROLIFT: Type: IMPLANTABLE DEVICE | Site: URETHRA | Status: FUNCTIONAL

## 2020-06-23 RX ORDER — FENTANYL CITRATE/PF 50 MCG/ML
50 SYRINGE (ML) INJECTION
Status: DISCONTINUED | OUTPATIENT
Start: 2020-06-23 | End: 2020-06-23 | Stop reason: HOSPADM

## 2020-06-23 RX ORDER — DEXAMETHASONE SODIUM PHOSPHATE 4 MG/ML
INJECTION, SOLUTION INTRA-ARTICULAR; INTRALESIONAL; INTRAMUSCULAR; INTRAVENOUS; SOFT TISSUE AS NEEDED
Status: DISCONTINUED | OUTPATIENT
Start: 2020-06-23 | End: 2020-06-23 | Stop reason: SURG

## 2020-06-23 RX ORDER — SODIUM CHLORIDE 9 MG/ML
125 INJECTION, SOLUTION INTRAVENOUS CONTINUOUS
Status: DISCONTINUED | OUTPATIENT
Start: 2020-06-23 | End: 2020-06-23 | Stop reason: HOSPADM

## 2020-06-23 RX ORDER — GENTAMICIN SULFATE 100 MG/100ML
100 INJECTION, SOLUTION INTRAVENOUS ONCE
Status: COMPLETED | OUTPATIENT
Start: 2020-06-23 | End: 2020-06-23

## 2020-06-23 RX ORDER — ONDANSETRON 2 MG/ML
INJECTION INTRAMUSCULAR; INTRAVENOUS AS NEEDED
Status: DISCONTINUED | OUTPATIENT
Start: 2020-06-23 | End: 2020-06-23 | Stop reason: SURG

## 2020-06-23 RX ORDER — MIDAZOLAM HYDROCHLORIDE 2 MG/2ML
INJECTION, SOLUTION INTRAMUSCULAR; INTRAVENOUS AS NEEDED
Status: DISCONTINUED | OUTPATIENT
Start: 2020-06-23 | End: 2020-06-23 | Stop reason: SURG

## 2020-06-23 RX ORDER — FENTANYL CITRATE 50 UG/ML
INJECTION, SOLUTION INTRAMUSCULAR; INTRAVENOUS AS NEEDED
Status: DISCONTINUED | OUTPATIENT
Start: 2020-06-23 | End: 2020-06-23 | Stop reason: SURG

## 2020-06-23 RX ORDER — HYDROCODONE BITARTRATE AND ACETAMINOPHEN 5; 325 MG/1; MG/1
1 TABLET ORAL EVERY 4 HOURS PRN
Qty: 5 TABLET | Refills: 0 | Status: SHIPPED | OUTPATIENT
Start: 2020-06-23 | End: 2020-06-25

## 2020-06-23 RX ORDER — NITROFURANTOIN MACROCRYSTALS 100 MG/1
100 CAPSULE ORAL 2 TIMES DAILY
Qty: 6 CAPSULE | Refills: 0 | Status: SHIPPED | OUTPATIENT
Start: 2020-06-23 | End: 2020-06-26

## 2020-06-23 RX ORDER — MAGNESIUM HYDROXIDE 1200 MG/15ML
LIQUID ORAL AS NEEDED
Status: DISCONTINUED | OUTPATIENT
Start: 2020-06-23 | End: 2020-06-23 | Stop reason: HOSPADM

## 2020-06-23 RX ORDER — PROPOFOL 10 MG/ML
INJECTION, EMULSION INTRAVENOUS AS NEEDED
Status: DISCONTINUED | OUTPATIENT
Start: 2020-06-23 | End: 2020-06-23 | Stop reason: SURG

## 2020-06-23 RX ORDER — ALBUTEROL SULFATE 2.5 MG/3ML
2.5 SOLUTION RESPIRATORY (INHALATION) ONCE AS NEEDED
Status: DISCONTINUED | OUTPATIENT
Start: 2020-06-23 | End: 2020-06-23 | Stop reason: HOSPADM

## 2020-06-23 RX ORDER — HYDROCODONE BITARTRATE AND ACETAMINOPHEN 5; 325 MG/1; MG/1
1 TABLET ORAL EVERY 6 HOURS PRN
Status: DISCONTINUED | OUTPATIENT
Start: 2020-06-23 | End: 2020-06-23 | Stop reason: HOSPADM

## 2020-06-23 RX ADMIN — ONDANSETRON 4 MG: 2 INJECTION INTRAMUSCULAR; INTRAVENOUS at 12:28

## 2020-06-23 RX ADMIN — FENTANYL CITRATE 25 MCG: 50 INJECTION, SOLUTION INTRAMUSCULAR; INTRAVENOUS at 12:30

## 2020-06-23 RX ADMIN — PROPOFOL 50 MG: 10 INJECTION, EMULSION INTRAVENOUS at 12:45

## 2020-06-23 RX ADMIN — SODIUM CHLORIDE 125 ML/HR: 0.9 INJECTION, SOLUTION INTRAVENOUS at 09:51

## 2020-06-23 RX ADMIN — FENTANYL CITRATE 50 MCG: 50 INJECTION, SOLUTION INTRAMUSCULAR; INTRAVENOUS at 13:31

## 2020-06-23 RX ADMIN — PROPOFOL 50 MG: 10 INJECTION, EMULSION INTRAVENOUS at 12:44

## 2020-06-23 RX ADMIN — DEXAMETHASONE SODIUM PHOSPHATE 4 MG: 4 INJECTION, SOLUTION INTRAMUSCULAR; INTRAVENOUS at 12:30

## 2020-06-23 RX ADMIN — MIDAZOLAM 2 MG: 1 INJECTION INTRAMUSCULAR; INTRAVENOUS at 12:18

## 2020-06-23 RX ADMIN — FENTANYL CITRATE 50 MCG: 50 INJECTION, SOLUTION INTRAMUSCULAR; INTRAVENOUS at 13:26

## 2020-06-23 RX ADMIN — FENTANYL CITRATE 25 MCG: 50 INJECTION, SOLUTION INTRAMUSCULAR; INTRAVENOUS at 12:24

## 2020-06-23 RX ADMIN — FENTANYL CITRATE 25 MCG: 50 INJECTION, SOLUTION INTRAMUSCULAR; INTRAVENOUS at 12:37

## 2020-06-23 RX ADMIN — PROPOFOL 200 MG: 10 INJECTION, EMULSION INTRAVENOUS at 12:24

## 2020-06-23 RX ADMIN — FENTANYL CITRATE 25 MCG: 50 INJECTION, SOLUTION INTRAMUSCULAR; INTRAVENOUS at 12:27

## 2020-06-23 RX ADMIN — LIDOCAINE HYDROCHLORIDE 100 MG: 20 INJECTION, SOLUTION INTRAVENOUS at 12:24

## 2020-06-23 RX ADMIN — GENTAMICIN SULFATE 100 MG: 100 INJECTION, SOLUTION INTRAVENOUS at 12:03

## 2020-06-23 RX ADMIN — HYDROCODONE BITARTRATE AND ACETAMINOPHEN 1 TABLET: 5; 325 TABLET ORAL at 14:42

## 2020-06-23 RX ADMIN — FENTANYL CITRATE 50 MCG: 50 INJECTION, SOLUTION INTRAMUSCULAR; INTRAVENOUS at 14:00

## 2020-06-29 ENCOUNTER — OFFICE VISIT (OUTPATIENT)
Dept: FAMILY MEDICINE CLINIC | Facility: CLINIC | Age: 70
End: 2020-06-29
Payer: MEDICARE

## 2020-06-29 VITALS
SYSTOLIC BLOOD PRESSURE: 156 MMHG | HEIGHT: 69 IN | BODY MASS INDEX: 33.03 KG/M2 | WEIGHT: 223 LBS | HEART RATE: 94 BPM | TEMPERATURE: 97.2 F | DIASTOLIC BLOOD PRESSURE: 82 MMHG | OXYGEN SATURATION: 98 %

## 2020-06-29 DIAGNOSIS — J43.8 OTHER EMPHYSEMA (HCC): ICD-10-CM

## 2020-06-29 DIAGNOSIS — Z00.00 MEDICARE ANNUAL WELLNESS VISIT, SUBSEQUENT: Primary | ICD-10-CM

## 2020-06-29 DIAGNOSIS — Z23 NEED FOR SHINGLES VACCINE: ICD-10-CM

## 2020-06-29 PROCEDURE — 1125F AMNT PAIN NOTED PAIN PRSNT: CPT | Performed by: FAMILY MEDICINE

## 2020-06-29 PROCEDURE — 4040F PNEUMOC VAC/ADMIN/RCVD: CPT | Performed by: FAMILY MEDICINE

## 2020-06-29 PROCEDURE — G0438 PPPS, INITIAL VISIT: HCPCS | Performed by: FAMILY MEDICINE

## 2020-06-29 PROCEDURE — 1036F TOBACCO NON-USER: CPT | Performed by: FAMILY MEDICINE

## 2020-06-29 PROCEDURE — 1170F FXNL STATUS ASSESSED: CPT | Performed by: FAMILY MEDICINE

## 2020-06-29 PROCEDURE — 3008F BODY MASS INDEX DOCD: CPT | Performed by: FAMILY MEDICINE

## 2020-06-29 PROCEDURE — 1160F RVW MEDS BY RX/DR IN RCRD: CPT | Performed by: FAMILY MEDICINE

## 2020-06-29 PROCEDURE — 1123F ACP DISCUSS/DSCN MKR DOCD: CPT | Performed by: FAMILY MEDICINE

## 2020-06-30 ENCOUNTER — PROCEDURE VISIT (OUTPATIENT)
Dept: UROLOGY | Facility: AMBULATORY SURGERY CENTER | Age: 70
End: 2020-06-30
Payer: MEDICARE

## 2020-06-30 VITALS
TEMPERATURE: 98 F | HEART RATE: 80 BPM | DIASTOLIC BLOOD PRESSURE: 80 MMHG | HEIGHT: 69 IN | WEIGHT: 223 LBS | SYSTOLIC BLOOD PRESSURE: 130 MMHG | BODY MASS INDEX: 33.03 KG/M2

## 2020-06-30 DIAGNOSIS — N35.812 OTHER STRICTURE OF BULBOUS URETHRA IN MALE: ICD-10-CM

## 2020-06-30 DIAGNOSIS — N40.1 BENIGN PROSTATIC HYPERPLASIA WITH LOWER URINARY TRACT SYMPTOMS, SYMPTOM DETAILS UNSPECIFIED: Primary | ICD-10-CM

## 2020-06-30 PROCEDURE — 1170F FXNL STATUS ASSESSED: CPT

## 2020-06-30 PROCEDURE — 1160F RVW MEDS BY RX/DR IN RCRD: CPT

## 2020-06-30 PROCEDURE — 4040F PNEUMOC VAC/ADMIN/RCVD: CPT

## 2020-06-30 PROCEDURE — 99211 OFF/OP EST MAY X REQ PHY/QHP: CPT

## 2020-06-30 PROCEDURE — 1125F AMNT PAIN NOTED PAIN PRSNT: CPT

## 2020-07-03 DIAGNOSIS — N13.8 BPH WITH OBSTRUCTION/LOWER URINARY TRACT SYMPTOMS: ICD-10-CM

## 2020-07-03 DIAGNOSIS — N40.1 BPH WITH OBSTRUCTION/LOWER URINARY TRACT SYMPTOMS: ICD-10-CM

## 2020-07-05 RX ORDER — ALFUZOSIN HYDROCHLORIDE 10 MG/1
TABLET, EXTENDED RELEASE ORAL
Qty: 30 TABLET | Refills: 0 | Status: SHIPPED | OUTPATIENT
Start: 2020-07-05 | End: 2020-07-29

## 2020-07-29 DIAGNOSIS — N40.1 BPH WITH OBSTRUCTION/LOWER URINARY TRACT SYMPTOMS: ICD-10-CM

## 2020-07-29 DIAGNOSIS — N13.8 BPH WITH OBSTRUCTION/LOWER URINARY TRACT SYMPTOMS: ICD-10-CM

## 2020-07-29 RX ORDER — ALFUZOSIN HYDROCHLORIDE 10 MG/1
TABLET, EXTENDED RELEASE ORAL
Qty: 30 TABLET | Refills: 0 | Status: SHIPPED | OUTPATIENT
Start: 2020-07-29 | End: 2020-08-21

## 2020-07-31 ENCOUNTER — OFFICE VISIT (OUTPATIENT)
Dept: UROLOGY | Facility: AMBULATORY SURGERY CENTER | Age: 70
End: 2020-07-31
Payer: MEDICARE

## 2020-07-31 VITALS
BODY MASS INDEX: 32.73 KG/M2 | SYSTOLIC BLOOD PRESSURE: 128 MMHG | HEIGHT: 69 IN | HEART RATE: 70 BPM | TEMPERATURE: 97.1 F | WEIGHT: 221 LBS | DIASTOLIC BLOOD PRESSURE: 76 MMHG

## 2020-07-31 DIAGNOSIS — N13.8 BPH WITH OBSTRUCTION/LOWER URINARY TRACT SYMPTOMS: Primary | ICD-10-CM

## 2020-07-31 DIAGNOSIS — Z12.5 SCREENING FOR PROSTATE CANCER: ICD-10-CM

## 2020-07-31 DIAGNOSIS — N40.1 BPH WITH OBSTRUCTION/LOWER URINARY TRACT SYMPTOMS: Primary | ICD-10-CM

## 2020-07-31 LAB — POST-VOID RESIDUAL VOLUME, ML POC: 61 ML

## 2020-07-31 PROCEDURE — 99214 OFFICE O/P EST MOD 30 MIN: CPT | Performed by: NURSE PRACTITIONER

## 2020-07-31 PROCEDURE — 51798 US URINE CAPACITY MEASURE: CPT | Performed by: NURSE PRACTITIONER

## 2020-07-31 PROCEDURE — 1160F RVW MEDS BY RX/DR IN RCRD: CPT | Performed by: NURSE PRACTITIONER

## 2020-07-31 PROCEDURE — 3008F BODY MASS INDEX DOCD: CPT | Performed by: NURSE PRACTITIONER

## 2020-07-31 PROCEDURE — 1036F TOBACCO NON-USER: CPT | Performed by: NURSE PRACTITIONER

## 2020-07-31 PROCEDURE — 4040F PNEUMOC VAC/ADMIN/RCVD: CPT | Performed by: NURSE PRACTITIONER

## 2020-07-31 NOTE — PROGRESS NOTES
7/31/2020    Sarah Ross  1950  [id number]      Assessment  -BPH s/p Urolift, and dilation of urethral stricture (6/23/2020)  -Peyronie's plaque status post plication    Discussion/Plan  Angelika Lamb is a 72 y o  male being managed by Dr Sailaja Sheikh        1  BPH s/p Urolift, and dilation of urethral stricture (6/23/2020)- PVR in office today is 61 mL  He is pleased with the outcome of his urinary pattern post UroLift procedure  We did discuss discontinuing alfuzosin  He will stop medication after his current prescription is completed  2  Prostate cancer screening- patient annual PSA is due in October 2020    Follow-up in 3 months with PVR assessment, uroflow, PSA and PRABHAKAR  He was instructed to call with any issues    -All questions answered, patient agrees with plan      History of Present Illness  72 y o  male with a history of BPH, urethral stricture presents today for follow up  Patient status post UroLift procedure and urination on 06/23/2020  His Terry catheter was removed on 06/30/2020 in our office  He reports a significant improvement in his urinary flow  He remains on alfuzosin daily  Patient denies any gross hematuria or dysuria  Additional history includes Peyronie disease status post plication performed at the [address] [place] [address] in July 2019  His last PSA from October 2019 was 1 9  Review of Systems  Review of Systems   Constitutional: Negative  HENT: Negative  Respiratory: Negative  Cardiovascular: Negative  Gastrointestinal: Negative  Genitourinary: Negative for decreased urine volume, difficulty urinating, dysuria, flank pain, frequency, hematuria and urgency  Musculoskeletal: Negative  Skin: Negative  Neurological: Negative  Psychiatric/Behavioral: Negative          Past Medical History  Past Medical History:   Diagnosis Date    AC joint arthropathy     right    BPH (benign prostatic hyperplasia)     Carpal tunnel syndrome     bilateral    Chronic prostatitis     last assessed: 3/1/2014    Chronic UTI (urinary tract infection)     Colon polyp     Diverticulitis     twice    Ganglion     last assessed: 10/7/2013    GERD (gastroesophageal reflux disease)     GERD without esophagitis     Hyperlipidemia     Impingement syndrome of right shoulder     last assessed: 3/22/2016    Left inguinal hernia     description: repaired by Dr Faisal Villagran Peyronie's disease     had surgery    Pneumonia of left upper lobe due to infectious organism     last assessed: 2/8/2016    Sexual dysfunction     Stomach disorder     Wears glasses        Past Social History  Past Surgical History:   Procedure Laterality Date    COLONOSCOPY      COLONOSCOPY      CYSTOSCOPY      Diagnostic last assessed: 6/12/2014    CYSTOSTOMY      urethral stricture    HERNIA REPAIR      mesh left inguinal    NEUROPLASTY / TRANSPOSITION MEDIAN NERVE AT CARPAL TUNNEL      WI ANKLE SCOPE,PLANTAR FASCIOTOMY Left 11/18/2016    Procedure: PLANTAR FASCIOTOMY;  Surgeon: Edilia Cui DPM;  [facility];  Service: Podiatry    WI CYSTOURETHRO W/IMPLANT N/A 6/23/2020    Procedure: Negar Bare,  WITH INSERTION Marcelo Kid;  Surgeon: Hakan Eaton MD;  [facility];  Service: Urology    WI EXCIS PRIMARY GANGLION WRIST Left 1/24/2018    Procedure: WRIST RESECTION VOLAR GANGLION CYST;  Surgeon: Maria Dolores Haq MD;  [facility];  Service: Orthopedics    WI REVISE MEDIAN N/CARPAL TUNNEL SURG Right 8/24/2016    Procedure: CARPAL TUNNEL RELEASE ;  Surgeon: Maria Dolores Haq MD;  [facility];  Service: Orthopedics    WI REVISE MEDIAN N/CARPAL TUNNEL SURG Left 1/24/2018    Procedure: RELEASE CARPAL TUNNEL;  Surgeon: Maria Dolores Haq MD;  [facility];  Service: Orthopedics       Past Family History  Family History   Problem Relation Age of Onset    Cancer Father         bladder       Past Social history  Social History     Socioeconomic History    Marital status:      Spouse name: Not on file    Number of children: 1    Years of education: Not on file    Highest education level: Not on file   Occupational History    Occupation: Working full time    Social Needs    Financial resource strain: Not hard at all   Delevan-Qasim insecurity:     Worry: Never true     Inability: Never true   [employer] needs:     Medical: No     Non-medical: No   Tobacco Use    Smoking status: Former Smoker     Last attempt to quit: 2014     Years since quittin 5    Smokeless tobacco: Never Used   Substance and Sexual Activity    Alcohol use: Yes     Frequency: Monthly or less     Drinks per session: 1 or 2     Binge frequency: Never     Comment: social and denies alcohol use causing problems     Drug use: No    Sexual activity: Not Currently   Lifestyle    Physical activity:     Days per week: 0 days     Minutes per session: 0 min    Stress: Not at all   Relationships    Social connections:     Talks on phone: Twice a week     Gets together: Once a week     Attends Mandaeism service: Never     Active member of club or organization: No     Attends meetings of clubs or organizations: Never     Relationship status:     Intimate partner violence:     Fear of current or ex partner: No     Emotionally abused: No     Physically abused: No     Forced sexual activity: No   Other Topics Concern    Not on file   Social History Narrative    Not on file       Current Medications  Current Outpatient Medications   Medication Sig Dispense Refill    alfuzosin (UROXATRAL) 10 mg 24 hr tablet TAKE 1 TABLET BY MOUTH EVERY DAY 30 tablet 0    Cholecalciferol (VITAMIN D PO) Take by mouth      Coenzyme Q10 (CO Q 10 PO) Take 200 mg by mouth      diphenhydrAMINE (BENADRYL) 50 mg capsule Take 50 mg by mouth every 6 (six) hours as needed for itching      esomeprazole (NexIUM) 40 MG capsule Take 1 capsule (40 mg total) by mouth daily 90 capsule 1    simvastatin (ZOCOR) 40 mg tablet Take 1 tablet (40 mg total) by mouth daily at bedtime 90 tablet 1     No current facility-administered medications for this visit  Allergies  No Known Allergies    Past Medical History, Social History, Family History, medications and allergies were reviewed  Vitals  Vitals:    07/31/20 0901   BP: 128/76   BP Location: Left arm   Patient Position: Sitting   Cuff Size: Large   Pulse: 70   Temp: (!) 97 1 °F (36 2 °C)   TempSrc: Temporal   Weight: 100 kg (221 lb)   Height: 5' 9" (1 753 m)       Physical Exam  Physical Exam   Constitutional: He is oriented to person, place, and time  He appears well-developed and well-nourished  HENT:   Head: Normocephalic  Eyes: Pupils are equal, round, and reactive to light  Neck: Normal range of motion  Pulmonary/Chest: Effort normal    Abdominal: Soft  Normal appearance  There is no CVA tenderness  Genitourinary:   Genitourinary Comments: No suprapubic discomfort or distention   Musculoskeletal: Normal range of motion  Neurological: He is alert and oriented to person, place, and time  Skin: Skin is warm and dry  Psychiatric: He has a normal mood and affect  His behavior is normal  Judgment and thought content normal        Results    I have personally reviewed all pertinent lab results and reviewed with patient  Lab Results   Component Value Date    PSA 1 9 10/03/2019    PSA 0 4 01/16/2017     Lab Results   Component Value Date    GLUCOSE 105 03/11/2015    CALCIUM 9 6 10/03/2019     03/11/2015    K 4 4 10/03/2019    CO2 28 10/03/2019     10/03/2019    BUN 17 10/03/2019    CREATININE 0 89 10/03/2019     Lab Results   Component Value Date    WBC 9 03 10/03/2019    HGB 16 1 10/03/2019    HCT 50 4 (H) 10/03/2019    MCV 99 (H) 10/03/2019     10/03/2019     No results found for this or any previous visit (from the past 1 hour(s))

## 2020-08-21 ENCOUNTER — TELEPHONE (OUTPATIENT)
Dept: UROLOGY | Facility: AMBULATORY SURGERY CENTER | Age: 70
End: 2020-08-21

## 2020-08-21 DIAGNOSIS — N13.8 BPH WITH OBSTRUCTION/LOWER URINARY TRACT SYMPTOMS: ICD-10-CM

## 2020-08-21 DIAGNOSIS — N40.1 BPH WITH OBSTRUCTION/LOWER URINARY TRACT SYMPTOMS: ICD-10-CM

## 2020-08-21 RX ORDER — ALFUZOSIN HYDROCHLORIDE 10 MG/1
TABLET, EXTENDED RELEASE ORAL
Qty: 30 TABLET | Refills: 0 | Status: SHIPPED | OUTPATIENT
Start: 2020-08-21 | End: 2020-10-08

## 2020-08-24 NOTE — TELEPHONE ENCOUNTER
Called patient back and after reviewing last note it said that he could stop afluzosin since having URO lift procedure

## 2020-08-24 NOTE — TELEPHONE ENCOUNTER
Patient would like to know if he is supposed to continue alfuzosin      He can be reached at [phone]

## 2020-08-31 ENCOUNTER — TELEPHONE (OUTPATIENT)
Dept: FAMILY MEDICINE CLINIC | Facility: CLINIC | Age: 70
End: 2020-08-31

## 2020-08-31 DIAGNOSIS — J43.8 OTHER EMPHYSEMA (HCC): Primary | ICD-10-CM

## 2020-08-31 NOTE — TELEPHONE ENCOUNTER
ED HAS AN APPT W/PULMONOLOGY ON 10/8 BUT THEY WANT HIM TO HAVE AN X RAY DONE BEFORE THE VISIT IF YOU WOULD KINDLY PUT AN ORDER IN THE SYSTEM SO HE CAN GO DOWNSTAIRS TO GET THAT DONE PLEASE    [phone]  ANY QUESTIONS

## 2020-09-09 ENCOUNTER — APPOINTMENT (OUTPATIENT)
Dept: RADIOLOGY | Facility: CLINIC | Age: 70
End: 2020-09-09
Payer: MEDICARE

## 2020-09-09 DIAGNOSIS — J43.8 OTHER EMPHYSEMA (HCC): ICD-10-CM

## 2020-09-09 PROCEDURE — 71046 X-RAY EXAM CHEST 2 VIEWS: CPT

## 2020-10-08 ENCOUNTER — CONSULT (OUTPATIENT)
Dept: PULMONOLOGY | Facility: CLINIC | Age: 70
End: 2020-10-08
Payer: MEDICARE

## 2020-10-08 VITALS
SYSTOLIC BLOOD PRESSURE: 140 MMHG | TEMPERATURE: 97.9 F | HEIGHT: 69 IN | DIASTOLIC BLOOD PRESSURE: 96 MMHG | OXYGEN SATURATION: 97 % | WEIGHT: 228 LBS | HEART RATE: 81 BPM | BODY MASS INDEX: 33.77 KG/M2

## 2020-10-08 DIAGNOSIS — J84.9 ILD (INTERSTITIAL LUNG DISEASE) (HCC): Primary | ICD-10-CM

## 2020-10-08 DIAGNOSIS — Z87.891 FORMER SMOKER: ICD-10-CM

## 2020-10-08 PROBLEM — J43.8 OTHER EMPHYSEMA (HCC): Status: RESOLVED | Noted: 2019-11-07 | Resolved: 2020-10-08

## 2020-10-08 PROBLEM — J40 BRONCHITIS: Status: RESOLVED | Noted: 2019-05-06 | Resolved: 2020-10-08

## 2020-10-08 PROCEDURE — 99204 OFFICE O/P NEW MOD 45 MIN: CPT | Performed by: INTERNAL MEDICINE

## 2020-10-18 DIAGNOSIS — E78.5 HYPERLIPIDEMIA, UNSPECIFIED HYPERLIPIDEMIA TYPE: ICD-10-CM

## 2020-10-19 DIAGNOSIS — E78.5 HYPERLIPIDEMIA, UNSPECIFIED HYPERLIPIDEMIA TYPE: ICD-10-CM

## 2020-10-19 RX ORDER — SIMVASTATIN 40 MG
TABLET ORAL
Qty: 90 TABLET | Refills: 1 | OUTPATIENT
Start: 2020-10-19

## 2020-10-19 RX ORDER — SIMVASTATIN 40 MG
40 TABLET ORAL
Qty: 90 TABLET | Refills: 1 | Status: SHIPPED | OUTPATIENT
Start: 2020-10-19 | End: 2021-01-06 | Stop reason: SDUPTHER

## 2020-11-02 ENCOUNTER — OFFICE VISIT (OUTPATIENT)
Dept: UROLOGY | Facility: AMBULATORY SURGERY CENTER | Age: 70
End: 2020-11-02
Payer: MEDICARE

## 2020-11-02 VITALS
TEMPERATURE: 97.3 F | WEIGHT: 222 LBS | SYSTOLIC BLOOD PRESSURE: 158 MMHG | HEIGHT: 69 IN | HEART RATE: 91 BPM | BODY MASS INDEX: 32.88 KG/M2 | DIASTOLIC BLOOD PRESSURE: 88 MMHG

## 2020-11-02 DIAGNOSIS — R39.9 UTI SYMPTOMS: ICD-10-CM

## 2020-11-02 DIAGNOSIS — N35.812 OTHER STRICTURE OF BULBOUS URETHRA IN MALE: ICD-10-CM

## 2020-11-02 DIAGNOSIS — Z12.5 SCREENING FOR PROSTATE CANCER: Primary | ICD-10-CM

## 2020-11-02 LAB
BACTERIA UR QL AUTO: ABNORMAL /HPF
BILIRUB UR QL STRIP: NEGATIVE
CLARITY UR: ABNORMAL
COLOR UR: ABNORMAL
GLUCOSE UR STRIP-MCNC: NEGATIVE MG/DL
HGB UR QL STRIP.AUTO: NEGATIVE
HYALINE CASTS #/AREA URNS LPF: ABNORMAL /LPF
KETONES UR STRIP-MCNC: NEGATIVE MG/DL
LEUKOCYTE ESTERASE UR QL STRIP: ABNORMAL
NITRITE UR QL STRIP: POSITIVE
NON-SQ EPI CELLS URNS QL MICRO: ABNORMAL /HPF
PH UR STRIP.AUTO: 6 [PH]
POST-VOID RESIDUAL VOLUME, ML POC: 86 ML
PROT UR STRIP-MCNC: ABNORMAL MG/DL
RBC #/AREA URNS AUTO: ABNORMAL /HPF
SP GR UR STRIP.AUTO: 1.02 (ref 1–1.03)
UROBILINOGEN UR QL STRIP.AUTO: 0.2 E.U./DL
WBC #/AREA URNS AUTO: ABNORMAL /HPF

## 2020-11-02 PROCEDURE — 51798 US URINE CAPACITY MEASURE: CPT | Performed by: NURSE PRACTITIONER

## 2020-11-02 PROCEDURE — 51741 ELECTRO-UROFLOWMETRY FIRST: CPT | Performed by: NURSE PRACTITIONER

## 2020-11-02 PROCEDURE — 81001 URINALYSIS AUTO W/SCOPE: CPT | Performed by: NURSE PRACTITIONER

## 2020-11-02 PROCEDURE — 99214 OFFICE O/P EST MOD 30 MIN: CPT | Performed by: NURSE PRACTITIONER

## 2020-11-02 PROCEDURE — 87086 URINE CULTURE/COLONY COUNT: CPT | Performed by: NURSE PRACTITIONER

## 2020-11-02 PROCEDURE — 87181 SC STD AGAR DILUTION PER AGT: CPT | Performed by: NURSE PRACTITIONER

## 2020-11-02 PROCEDURE — 87186 SC STD MICRODIL/AGAR DIL: CPT | Performed by: NURSE PRACTITIONER

## 2020-11-02 PROCEDURE — 87077 CULTURE AEROBIC IDENTIFY: CPT | Performed by: NURSE PRACTITIONER

## 2020-11-04 ENCOUNTER — HOSPITAL ENCOUNTER (OUTPATIENT)
Dept: CT IMAGING | Facility: HOSPITAL | Age: 70
Discharge: HOME | End: 2020-11-04
Attending: INTERNAL MEDICINE
Payer: MEDICARE

## 2020-11-04 ENCOUNTER — HOSPITAL ENCOUNTER (OUTPATIENT)
Dept: PULMONOLOGY | Facility: HOSPITAL | Age: 70
Discharge: HOME | End: 2020-11-04
Attending: INTERNAL MEDICINE
Payer: MEDICARE

## 2020-11-04 DIAGNOSIS — J84.9 ILD (INTERSTITIAL LUNG DISEASE) (HCC): ICD-10-CM

## 2020-11-04 PROCEDURE — 71250 CT THORAX DX C-: CPT

## 2020-11-04 PROCEDURE — 94010 BREATHING CAPACITY TEST: CPT

## 2020-11-04 PROCEDURE — 94729 DIFFUSING CAPACITY: CPT

## 2020-11-04 PROCEDURE — 94726 PLETHYSMOGRAPHY LUNG VOLUMES: CPT | Performed by: INTERNAL MEDICINE

## 2020-11-04 PROCEDURE — 94726 PLETHYSMOGRAPHY LUNG VOLUMES: CPT

## 2020-11-04 PROCEDURE — 94729 DIFFUSING CAPACITY: CPT | Performed by: INTERNAL MEDICINE

## 2020-11-04 PROCEDURE — G1004 CDSM NDSC: HCPCS

## 2020-11-04 PROCEDURE — 94760 N-INVAS EAR/PLS OXIMETRY 1: CPT

## 2020-11-04 PROCEDURE — 94010 BREATHING CAPACITY TEST: CPT | Performed by: INTERNAL MEDICINE

## 2020-11-05 ENCOUNTER — TELEPHONE (OUTPATIENT)
Dept: OTHER | Facility: HOSPITAL | Age: 70
End: 2020-11-05

## 2020-11-06 ENCOUNTER — TELEPHONE (OUTPATIENT)
Dept: OTHER | Facility: HOSPITAL | Age: 70
End: 2020-11-06

## 2020-11-06 LAB — BACTERIA UR CULT: ABNORMAL

## 2020-11-09 ENCOUNTER — APPOINTMENT (OUTPATIENT)
Dept: LAB | Facility: CLINIC | Age: 70
End: 2020-11-09
Payer: MEDICARE

## 2020-11-09 DIAGNOSIS — Z12.5 SCREENING FOR PROSTATE CANCER: ICD-10-CM

## 2020-11-09 LAB — PSA SERPL-MCNC: 1 NG/ML (ref 0–4)

## 2020-11-09 PROCEDURE — G0103 PSA SCREENING: HCPCS

## 2020-11-09 PROCEDURE — 36415 COLL VENOUS BLD VENIPUNCTURE: CPT

## 2020-12-14 DIAGNOSIS — K21.9 GASTROESOPHAGEAL REFLUX DISEASE WITHOUT ESOPHAGITIS: ICD-10-CM

## 2020-12-14 RX ORDER — ESOMEPRAZOLE MAGNESIUM 40 MG/1
40 CAPSULE, DELAYED RELEASE ORAL DAILY
Qty: 90 CAPSULE | Refills: 1 | Status: SHIPPED | OUTPATIENT
Start: 2020-12-14 | End: 2021-06-07 | Stop reason: SDUPTHER

## 2021-01-06 ENCOUNTER — OFFICE VISIT (OUTPATIENT)
Dept: FAMILY MEDICINE CLINIC | Facility: CLINIC | Age: 71
End: 2021-01-06
Payer: MEDICARE

## 2021-01-06 ENCOUNTER — APPOINTMENT (OUTPATIENT)
Dept: RADIOLOGY | Facility: CLINIC | Age: 71
End: 2021-01-06
Payer: MEDICARE

## 2021-01-06 VITALS — HEART RATE: 78 BPM | SYSTOLIC BLOOD PRESSURE: 122 MMHG | DIASTOLIC BLOOD PRESSURE: 80 MMHG | TEMPERATURE: 98.9 F

## 2021-01-06 DIAGNOSIS — M25.50 POLYARTHRALGIA: ICD-10-CM

## 2021-01-06 DIAGNOSIS — E78.5 HYPERLIPIDEMIA, UNSPECIFIED HYPERLIPIDEMIA TYPE: ICD-10-CM

## 2021-01-06 DIAGNOSIS — M25.561 ACUTE PAIN OF RIGHT KNEE: Primary | ICD-10-CM

## 2021-01-06 DIAGNOSIS — Z13.6 SCREENING FOR CARDIOVASCULAR CONDITION: ICD-10-CM

## 2021-01-06 DIAGNOSIS — M54.41 ACUTE BILATERAL LOW BACK PAIN WITH RIGHT-SIDED SCIATICA: ICD-10-CM

## 2021-01-06 DIAGNOSIS — Z12.5 SCREENING FOR PROSTATE CANCER: ICD-10-CM

## 2021-01-06 DIAGNOSIS — M25.561 ACUTE PAIN OF RIGHT KNEE: ICD-10-CM

## 2021-01-06 DIAGNOSIS — J84.9 ILD (INTERSTITIAL LUNG DISEASE) (HCC): ICD-10-CM

## 2021-01-06 DIAGNOSIS — Z79.899 HIGH RISK MEDICATION USE: ICD-10-CM

## 2021-01-06 PROCEDURE — 73562 X-RAY EXAM OF KNEE 3: CPT

## 2021-01-06 PROCEDURE — 72110 X-RAY EXAM L-2 SPINE 4/>VWS: CPT

## 2021-01-06 PROCEDURE — 99214 OFFICE O/P EST MOD 30 MIN: CPT | Performed by: FAMILY MEDICINE

## 2021-01-06 RX ORDER — METHYLPREDNISOLONE 4 MG/1
TABLET ORAL
Qty: 1 EACH | Refills: 0 | Status: SHIPPED | OUTPATIENT
Start: 2021-01-06 | End: 2021-01-27

## 2021-01-06 RX ORDER — SIMVASTATIN 40 MG
40 TABLET ORAL
Qty: 90 TABLET | Refills: 1 | Status: SHIPPED | OUTPATIENT
Start: 2021-01-06 | End: 2021-05-03 | Stop reason: SDUPTHER

## 2021-01-06 NOTE — PROGRESS NOTES
Assessment/Plan:     Diagnoses and all orders for this visit:    Acute pain of right knee  -     XR knee 3 vw left non injury; Future  -     methylPREDNISolone 4 MG tablet therapy pack; Use as directed on package    Polyarthralgia  -     CBC and differential; Future  -     Comprehensive metabolic panel; Future  -     Sedimentation rate, automated; Future  -     Lyme Total Antibody Profile with reflex to WB; Future  -     Uric acid; Future    Hyperlipidemia, unspecified hyperlipidemia type  -     Lipid panel; Future  -     simvastatin (ZOCOR) 40 mg tablet; Take 1 tablet (40 mg total) by mouth daily at bedtime    ILD (interstitial lung disease) ([facility] [address] )    Acute bilateral low back pain with right-sided sciatica  -     XR spine lumbar minimum 4 views non injury; Future    Screening for prostate cancer  -     UA (URINE) with reflex to Scope  -     PSA, Total Screen; Future    High risk medication use  -     CBC and differential; Future  -     Comprehensive metabolic panel; Future    Screening for cardiovascular condition  -     CBC and differential; Future  -     Comprehensive metabolic panel; Future  -     UA (URINE) with reflex to Scope        Patient walks today with swollen left knee  Will order blood work to check for gout as well Lyme disease  X-ray ordered   Patient also has pinched nerve in his low back Will order a low back x-ray  Patient follows with pulmonary for his ILD   prednisone pack ordered   Will continue his other current medications   He will follow-up with me in 3-6 months or sooner if needed      Subjective:     Chief Complaint   Patient presents with    Follow-up     6 month checkup        Patient ID: Felisha Oconnell is a 72 y o  male  E ROSCOE  is a 72year old male presenting for a 6 month well check  Today he reports having left knee pain since this morning and a burning sensation on his right thigh for the past few weeks   He states the knee pain started this morning      The following portions of the patient's history were reviewed and updated as appropriate: allergies, current medications, past family history, past medical history, past social history, past surgical history and problem list     Review of Systems   Constitutional: Negative  HENT: Negative  Eyes: Negative  Respiratory: Negative  Cardiovascular: Negative  Gastrointestinal: Negative  Endocrine: Negative  Genitourinary: Negative  Musculoskeletal: Negative  Skin: Negative  Allergic/Immunologic: Negative  Neurological: Negative  Hematological: Negative  Psychiatric/Behavioral: Negative  All other systems reviewed and are negative  Objective:    Vitals:    01/06/21 0946   BP: 122/80   BP Location: Left arm   Patient Position: Sitting   Cuff Size: Standard   Pulse: 78   Temp: 98 9 °F (37 2 °C)   TempSrc: Tympanic          Physical Exam  Vitals signs and nursing note reviewed  Constitutional:       General: He is not in acute distress  Appearance: He is well-developed  HENT:      Head: Normocephalic  Right Ear: External ear normal       Left Ear: External ear normal       Nose: Nose normal    Eyes:      General:         Right eye: No discharge  Left eye: No discharge  Conjunctiva/sclera: Conjunctivae normal       Pupils: Pupils are equal, round, and reactive to light  Neck:      Musculoskeletal: Normal range of motion  Cardiovascular:      Rate and Rhythm: Normal rate and regular rhythm  Heart sounds: Normal heart sounds  Pulmonary:      Effort: Pulmonary effort is normal       Breath sounds: Normal breath sounds  Abdominal:      General: Bowel sounds are normal  There is no distension  Palpations: Abdomen is soft  Tenderness: There is no abdominal tenderness  Musculoskeletal: Normal range of motion  Comments:  Swollen left knee  Full range of motion   Skin:     General: Skin is warm and dry  Findings: No rash     Neurological:      Mental Status: He is alert and oriented to person, place, and time  Cranial Nerves: No cranial nerve deficit  Psychiatric:         Behavior: Behavior normal          Thought Content:  Thought content normal          Judgment: Judgment normal

## 2021-01-20 ENCOUNTER — TELEMEDICINE (OUTPATIENT)
Dept: GASTROENTEROLOGY | Facility: CLINIC | Age: 71
End: 2021-01-20

## 2021-01-20 VITALS — BODY MASS INDEX: 32.58 KG/M2 | HEIGHT: 69 IN | WEIGHT: 220 LBS

## 2021-01-20 DIAGNOSIS — Z86.010 HISTORY OF COLON POLYPS: Primary | ICD-10-CM

## 2021-01-20 RX ORDER — SODIUM, POTASSIUM,MAG SULFATES 17.5-3.13G
SOLUTION, RECONSTITUTED, ORAL ORAL
Qty: 2 BOTTLE | Refills: 0 | Status: SHIPPED | OUTPATIENT
Start: 2021-01-20 | End: 2021-01-27 | Stop reason: HOSPADM

## 2021-01-20 NOTE — PROGRESS NOTES
Why does your doctor want you to have this procedure? Hx of polyps    Do you have kidney disease?  no  If yes, are you on dialysis :     Have you had diverticulitis within the past 2 months? no    Are you diabetic?  no  If yes, insulin dependent: If yes, provide diabetic instructions sheet     Do take iron supplements?  no  If yes, instruct patient to hold iron supplement for 7 days prior    Are you on a blood thinner? no   Was the blood thinner sheet complete and faxed to cardiologist no  Plavix (clopidogrel), Coumadin (warfarin), Lovenox (enoxaparin), Xarelto (rivaroxaban), Pradaxa(dabigatran), Eliquis(apixaban) Savaysa/Lixiana (edoxapan)    Do you have an automatic implantable cardiac defibrillator (AICD)/pacemaker ([facility])? no  Was AICD/pacemaker sheet completed and faxed to cardiologist? no    Are you on home oxygen? no  If yes, continuous or nocturnal:     Have you been treated for MRSA, VRE or any communicable diseases? no    Heart attack, stroke, or stent within 3 months? no  Schedule at Hospital if within 3-6 months   Use nitroglycerin for chest pain in the last 6 months? no    History of organ  transplant?  no   If yes, notify Endo      History of neck/throat/tongue surgery or cancer? no  IF yes, notify Endo      Any problems with anesthesia in the past? no     Was stool C diff ordered?  no Stool specimen needs to be completed prior to procedure    Do have any facial or body piercings?no     Do you have a latex allergy? no     Do have an allergy to metals? (Bravo study only) no     If pediatric patient, was consent faxed to pediatrician no     Patient rights reviewed yes    Rx sent for Suprep to provider for signature  Instructions emailed to patient

## 2021-01-27 ENCOUNTER — ANESTHESIA (OUTPATIENT)
Dept: GASTROENTEROLOGY | Facility: AMBULATORY SURGERY CENTER | Age: 71
End: 2021-01-27

## 2021-01-27 ENCOUNTER — ANESTHESIA EVENT (OUTPATIENT)
Dept: GASTROENTEROLOGY | Facility: AMBULATORY SURGERY CENTER | Age: 71
End: 2021-01-27

## 2021-01-27 ENCOUNTER — HOSPITAL ENCOUNTER (OUTPATIENT)
Dept: GASTROENTEROLOGY | Facility: AMBULATORY SURGERY CENTER | Age: 71
Discharge: HOME | End: 2021-01-27
Payer: MEDICARE

## 2021-01-27 VITALS
OXYGEN SATURATION: 97 % | RESPIRATION RATE: 22 BRPM | DIASTOLIC BLOOD PRESSURE: 90 MMHG | TEMPERATURE: 97.8 F | HEART RATE: 78 BPM | SYSTOLIC BLOOD PRESSURE: 142 MMHG

## 2021-01-27 VITALS — HEART RATE: 74 BPM

## 2021-01-27 DIAGNOSIS — Z86.010 PERSONAL HISTORY OF COLONIC POLYPS: ICD-10-CM

## 2021-01-27 PROCEDURE — 88305 TISSUE EXAM BY PATHOLOGIST: CPT | Performed by: PATHOLOGY

## 2021-01-27 PROCEDURE — 45385 COLONOSCOPY W/LESION REMOVAL: CPT | Performed by: INTERNAL MEDICINE

## 2021-01-27 RX ORDER — PROPOFOL 10 MG/ML
INJECTION, EMULSION INTRAVENOUS AS NEEDED
Status: DISCONTINUED | OUTPATIENT
Start: 2021-01-27 | End: 2021-01-27

## 2021-01-27 RX ORDER — SODIUM CHLORIDE 9 MG/ML
50 INJECTION, SOLUTION INTRAVENOUS CONTINUOUS
Status: DISCONTINUED | OUTPATIENT
Start: 2021-01-27 | End: 2021-01-31 | Stop reason: HOSPADM

## 2021-01-27 RX ADMIN — SODIUM CHLORIDE 50 ML/HR: 9 INJECTION, SOLUTION INTRAVENOUS at 08:42

## 2021-01-27 RX ADMIN — PROPOFOL 100 MG: 10 INJECTION, EMULSION INTRAVENOUS at 09:24

## 2021-01-27 RX ADMIN — PROPOFOL 150 MG: 10 INJECTION, EMULSION INTRAVENOUS at 09:04

## 2021-01-27 NOTE — DISCHARGE INSTRUCTIONS
Hemorrhoids   WHAT YOU NEED TO KNOW:   What are hemorrhoids? Hemorrhoids are swollen blood vessels inside your rectum (internal hemorrhoids) or on your anus (external hemorrhoids)  Sometimes a hemorrhoid may prolapse  This means it extends out of your anus  What increases my risk for hemorrhoids? · Pregnancy or obesity    · Straining or sitting for a long time during bowel movements    · Liver disease    · Weak muscles around the anus caused by older age, rectal surgery, or anal intercourse    · A lack of physical activity    · Chronic diarrhea or constipation    · A low-fiber diet    What are the signs and symptoms of hemorrhoids? · Pain or itching around your anus or inside your rectum    · Swelling or bumps around your anus    · Bright red blood in your bowel movement, on the toilet paper, or in the toilet bowl    · Tissue bulging out of your anus (prolapsed hemorrhoids)    · Incontinence (poor control over urine or bowel movements)    How are hemorrhoids diagnosed? Your healthcare provider will ask about your symptoms, the foods you eat, and your bowel movements  He or she will examine your anus for external hemorrhoids  You may need the following:  · A digital rectal exam  is a test to check for hemorrhoids  Your healthcare provider will put a gloved finger inside your anus to feel for the hemorrhoids  · An anoscopy  is a test that uses a scope (small tube with a light and camera on the end) to look at your hemorrhoids  How are hemorrhoids treated? Treatment will depend on your symptoms  You may need any of the following:  · Medicines  can help decrease pain and swelling, and soften your bowel movement  The medicine may be a pill, pad, cream, or ointment  · Procedures  may be used to shrink or remove your hemorrhoid  Examples include rubber-band ligation, sclerotherapy, and photocoagulation  These procedures may be done in your healthcare provider's office   Ask your healthcare provider for more information about these procedures  · Surgery  may be needed to shrink or remove your hemorrhoids  How can I manage my symptoms? · Apply ice on your anus for 15 to 20 minutes every hour or as directed  Use an ice pack, or put crushed ice in a plastic bag  Cover it with a towel before you apply it to your anus  Ice helps prevent tissue damage and decreases swelling and pain  · Take a sitz bath  Fill a bathtub with 4 to 6 inches of warm water  You may also use a sitz bath pan that fits inside a toilet bowl  Sit in the sitz bath for 15 minutes  Do this 3 times a day, and after each bowel movement  The warm water can help decrease pain and swelling  · Keep your anal area clean  Gently wash the area with warm water daily  Soap may irritate the area  After a bowel movement, wipe with moist towelettes or wet toilet paper  Dry toilet paper can irritate the area  How can I help prevent hemorrhoids? · Do not strain to have a bowel movement  Do not sit on the toilet too long  These actions can increase pressure on the tissues in your rectum and anus  · Drink plenty of liquids  Liquids can help prevent constipation  Ask how much liquid to drink each day and which liquids are best for you  · Eat a variety of high-fiber foods  Examples include fruits, vegetables, and whole grains  Ask your healthcare provider how much fiber you need each day  You may need to take a fiber supplement  · Exercise as directed  Exercise, such as walking, may make it easier to have a bowel movement  Ask your healthcare provider to help you create an exercise plan  · Do not have anal sex  Anal sex can weaken the skin around your rectum and anus  · Avoid heavy lifting  This can cause straining and increase your risk for another hemorrhoid  When should I seek immediate care? · You have severe pain in your rectum or around your anus      · You have severe pain in your abdomen and you are vomiting  · You have bleeding from your anus that soaks through your underwear  When should I contact my healthcare provider? · You have frequent and painful bowel movements  · Your hemorrhoid looks or feels more swollen than usual      · You do not have a bowel movement for 2 days or more  · You see or feel tissue coming through your anus  · You have questions or concerns about your condition or care  CARE AGREEMENT:   You have the right to help plan your care  Learn about your health condition and how it may be treated  Discuss treatment options with your healthcare providers to decide what care you want to receive  You always have the right to refuse treatment  The above information is an  only  It is not intended as medical advice for individual conditions or treatments  Talk to your doctor, nurse or pharmacist before following any medical regimen to see if it is safe and effective for you  © Copyright [address] Information is for End User's use only and may not be sold, redistributed or otherwise used for commercial purposes  All illustrations and images included in CareNotes® are the copyrighted property of A D A M , Inc  or [address] [place] [address]  Colorectal Polyps   WHAT YOU NEED TO KNOW:   What are colorectal polyps? Colorectal polyps are small growths of tissue in the lining of the colon and rectum  Most polyps are hyperplastic polyps and are usually benign (noncancerous)  Certain types of polyps, called adenomatous polyps, may turn into cancer  What increases my risk of colorectal polyps? The exact cause of colorectal polyps is unknown   The following may increase your risk:  · Older age    · A diet of foods high in fat and low in fiber     · Family history of polyps    · Intestinal diseases, such as Crohn's disease or ulcerative colitis    · An unhealthy lifestyle, such as physical inactivity, smoking, or drinking alcohol    · Obesity    What are the signs and symptoms of colorectal polyps? · Blood in your bowel movement or bleeding from the rectum    · Change in bowel movement habits, such as diarrhea and constipation    · Abdominal pain    How are colorectal polyps diagnosed? You should have fecal blood screening once a year for colorectal disease if you are over 48years old  You should be screened earlier if you have an intestinal disease or a family history of polyps or colorectal cancer  During this screening, a sample of your bowel movement is checked for blood, which may be an early sign of colorectal polyps or cancer  You may also need any of the following tests:  · Digital rectal exam:  Your healthcare provider will examine your anus and use a finger to check your rectum for polyps  · Barium enema: A barium enema is an x-ray of the colon  A tube is put into your anus, and a liquid called barium is put through the tube  Barium is used so that healthcare providers can see your colon better on the x-ray film  · Virtual colonoscopy: This is a CT scan that takes pictures of the inside of your colon and rectum  A small, flexible tube is put into your rectum and air or carbon dioxide (gas) is used to expand your colon  This lets healthcare providers clearly see your colon and any polyps on a monitor  · Colonoscopy or sigmoidoscopy: These procedures help your healthcare provider see the inside of your colon using a flexible tube with a small light and camera on the end  During a sigmoidoscopy, your healthcare provider will only look at rectum and lower colon  During a colonoscopy, healthcare providers will look at the full length of your colon  Healthcare providers may remove a small amount of tissue from the colon for a biopsy  How are colorectal polyps treated? A polypectomy is a minimally invasive procedure to remove your polyps  They may be removed during a colonoscopy or sigmoidoscopy   Your healthcare provider may need to remove the polyps with a laparoscope  Laparoscopy is done by inserting a small, flexible scope into incisions made on your abdomen  What are the risks of colorectal polyps? You may bleed during a colonoscopy procedure  Your bowel may be perforated (torn) when polyps are removed  This may lead to an open abdominal surgery  During surgery, you may bleed too much or get an infection  Adenomatous polyps that are not removed may turn into cancer and become more difficult to treat  Where can I find support and more information? · [facility] ([facility])  [address] [place], [place] [address]  Phone: [phone]  Web Address: Tanja Giordano  [facility] gov    When should I contact my healthcare provider? · You have a fever  · You have chills, a cough, or feel weak and achy  · You have abdominal pain that does not go away or gets worse after you take medicine  · Your abdomen is swollen  · You are losing weight without trying  · You have questions or concerns about your condition or care  When should I seek immediate care or call 911? · You have sudden shortness of breath  · You have a fast heart rate, fast breathing, or are too dizzy to stand up  · You have severe abdominal pain  · You see blood in your bowel movement  CARE AGREEMENT:   You have the right to help plan your care  Learn about your health condition and how it may be treated  Discuss treatment options with your healthcare providers to decide what care you want to receive  You always have the right to refuse treatment  The above information is an  only  It is not intended as medical advice for individual conditions or treatments  Talk to your doctor, nurse or pharmacist before following any medical regimen to see if it is safe and effective for you    © Copyright [address] [place] [address] Drive Information is for End User's use only and may not be sold, redistributed or otherwise used for commercial purposes  All illustrations and images included in CareNotes® are the copyrighted property of A D A M , Inc  or Flower Mendez   Diverticulosis Diet   WHAT YOU NEED TO KNOW:   What is a diverticulosis diet? A diverticulosis diet includes high-fiber foods  High-fiber foods help you have regular bowel movements  Extra fiber may decrease your risk of forming new diverticula (small pockets) in your intestine  A high-fiber diet may also help prevent diverticulitis  Diverticulitis is a painful condition that occurs when diverticula become inflamed or infected  You do not need to avoid nuts, seeds, corn, or popcorn while you are on a diverticulosis diet  How much fiber do I need? You may need 25 to 35 grams of fiber each day  Ask your dietitian or healthcare provider how much fiber you should have  Increase your intake of fiber slowly  When you eat more fiber, you may have gas and feel bloated  You may need to take a fiber supplement if you do not get enough fiber from food  Drink plenty of liquids as you increase the fiber in your diet  Your dietitian or healthcare provider may recommend 8 eight-ounce cups or more each day  Ask which liquids are best for you  Which foods are high in fiber? · Foods with at least 4 grams of fiber per serving:      ? ? to ½ cup of high-fiber cereal (check the nutrition label on the box)    ? ½ cup of blackberries or raspberries    ? 4 dried prunes    ? 1 cooked artichoke    ? ½ cup of cooked legumes, such as lentils, or red, kidney, and barreto beans    · Foods with 1 to 3 grams of fiber per serving:      ? 1 slice of whole-wheat, pumpernickel, or rye bread    ? 4 whole-wheat crackers    ? ½ cup of cereal with 1 to 3 grams of fiber per serving (check the nutrition label on the box)    ? 1 piece of fruit, such as an apple, banana, pear, kiwi, or orange    ? 3 dates    ? ½ cup of canned apricots, fruit cocktail, peaches, or pears    ?  ½ cup of raw or cooked vegetables, such as carrots, cauliflower, cabbage, spinach, squash, or corn    When should I contact my healthcare provider? · You have questions about a high-fiber diet  · You have a change in your bowel movements  · You have an upset stomach  · You have a fever  · You have pain in your lower abdomen on the left side  · You have questions about your condition or care  CARE AGREEMENT:   You have the right to help plan your care  Learn about your health condition and how it may be treated  Discuss treatment options with your healthcare providers to decide what care you want to receive  You always have the right to refuse treatment  The above information is an  only  It is not intended as medical advice for individual conditions or treatments  Talk to your doctor, nurse or pharmacist before following any medical regimen to see if it is safe and effective for you  © Copyright [address] Information is for End User's use only and may not be sold, redistributed or otherwise used for commercial purposes   All illustrations and images included in CareNotes® are the copyrighted property of A D A M , Inc  or [address] [place] [address]

## 2021-01-27 NOTE — ANESTHESIA POSTPROCEDURE EVALUATION
Post-Op Assessment Note    CV Status:  Stable  Pain Score: 0    Pain management: adequate     Mental Status:  Sleepy   Hydration Status:  Euvolemic   PONV Controlled:  Controlled   Airway Patency:  Patent       Post Op Vitals Reviewed: No      Staff: Anesthesiologist         No complications documented      BP      Temp      Pulse     Resp      SpO2

## 2021-01-27 NOTE — ANESTHESIA PREPROCEDURE EVALUATION
Review of Systems/Medical History  Patient summary reviewed  Chart reviewed  Cardiovascular   Pulmonary       GI/Hepatic            Endo/Other     GYN       Hematology   Musculoskeletal       Neurology   Psychology           Physical Exam    Airway    Mallampati score: I  TM Distance: >3 FB       Dental   No notable dental hx     Cardiovascular  Rhythm: regular, Rate: normal,     Pulmonary      Other Findings        Anesthesia Plan  ASA Score- 2     Anesthesia Type-     Plan Factors-    Chart reviewed  Patient summary reviewed  Induction- intravenous  Postoperative Plan-     Informed Consent- Anesthetic plan and risks discussed with patient

## 2021-01-27 NOTE — H&P
History and Physical - [facility]  Miky Muñiz 72 y o  male MRN: [id number]    HPI: Miky Muñiz is a 72y o  year old male who presents for surveillance colonoscopy  Patient had 10 mm cecal polyp in 2017    REVIEW OF SYSTEMS: Per the HPI, and otherwise unremarkable      Historical Information   Past Medical History:   Diagnosis Date    AC joint arthropathy     right    BPH (benign prostatic hyperplasia)     Carpal tunnel syndrome     bilateral    Chronic prostatitis     last assessed: 3/1/2014    Chronic UTI (urinary tract infection)     Colon polyp     Diverticulitis     twice    Ganglion     last assessed: 10/7/2013    GERD (gastroesophageal reflux disease)     GERD without esophagitis     Hyperlipidemia     Impingement syndrome of right shoulder     last assessed: 3/22/2016    Left inguinal hernia     description: repaired by Dr Jerica Washington Peyronie's disease     had surgery    Pneumonia of left upper lobe due to infectious organism     last assessed: 2/8/2016    Sexual dysfunction     Stomach disorder     Wears glasses      Past Surgical History:   Procedure Laterality Date    COLONOSCOPY      COLONOSCOPY      CYSTOSCOPY      Diagnostic last assessed: 6/12/2014    CYSTOSTOMY      urethral stricture    HERNIA REPAIR      mesh left inguinal    NEUROPLASTY / TRANSPOSITION MEDIAN NERVE AT CARPAL TUNNEL      MI ANKLE SCOPE,PLANTAR FASCIOTOMY Left 11/18/2016    Procedure: PLANTAR FASCIOTOMY;  Surgeon: Lashonda Taylor DPM;  [facility];  Service: Podiatry    MI CYSTOURETHRO W/IMPLANT N/A 6/23/2020    Procedure: Amelia Gipson,  WITH INSERTION Patrick Stafford;  Surgeon: French Phillips MD;  [facility];  Service: Urology    MI EXCIS PRIMARY GANGLION WRIST Left 1/24/2018    Procedure: WRIST RESECTION VOLAR GANGLION CYST;  Surgeon: Ashley Middleton MD;  [facility];  Service: Orthopedics    MI REVISE MEDIAN N/CARPAL TUNNEL SURG Right 8/24/2016 Procedure: CARPAL TUNNEL RELEASE ;  Surgeon: Elicia Wilcox MD;  [facility];  Service: Orthopedics    [place] REVISE MEDIAN N/CARPAL TUNNEL SURG Left 2018    Procedure: RELEASE CARPAL TUNNEL;  Surgeon: Elicia Wilcox MD;  [facility];  Service: Orthopedics     Social History   Social History     Substance and Sexual Activity   Alcohol Use Yes    Frequency: 4 or more times a week    Comment: social and denies alcohol use causing problems      Social History     Substance and Sexual Activity   Drug Use No     Social History     Tobacco Use   Smoking Status Former Smoker    Packs/day: 1 50    Years: 45 00    Pack years: 67 50    Types: Cigarettes    Start date:     Quit date:     Years since quittin 0   Smokeless Tobacco Never Used     Family History   Problem Relation Age of Onset    Cancer Father         bladder    Colon polyps Neg Hx     Colon cancer Neg Hx        Meds/Allergies       Current Outpatient Medications:     Cholecalciferol (VITAMIN D PO)    Coenzyme Q10 (CO Q 10 PO)    diphenhydrAMINE (BENADRYL) 50 mg capsule    esomeprazole (NexIUM) 40 MG capsule    Na Sulfate-K Sulfate-Mg Sulf (Suprep Bowel Prep Kit) 17 5-3 13-1 6 GM/177ML SOLN    simvastatin (ZOCOR) 40 mg tablet    Current Facility-Administered Medications:     sodium chloride 0 9 % infusion, 50 mL/hr, Intravenous, Continuous, 50 mL/hr at 21 0842    No Known Allergies    Objective     /79   Pulse 78   Temp 97 8 °F (36 6 °C) (Temporal)   Resp 15   SpO2 98%     PHYSICAL EXAM    Gen: NAD AAOx3  CV: S1S2 RRR no m/r/g  CHEST: Clear b/l no c/r/w  ABD: soft, +BS NT/ND  EXT: no edema    ASSESSMENT/PLAN:  This is a 72y o  year old male here for surveillance colonoscopy and he is stable and optimized for his procedure

## 2021-03-03 ENCOUNTER — APPOINTMENT (OUTPATIENT)
Dept: LAB | Facility: CLINIC | Age: 71
End: 2021-03-03
Payer: MEDICARE

## 2021-03-03 DIAGNOSIS — Z13.6 SCREENING FOR CARDIOVASCULAR CONDITION: ICD-10-CM

## 2021-03-03 DIAGNOSIS — M25.50 POLYARTHRALGIA: ICD-10-CM

## 2021-03-03 DIAGNOSIS — E78.5 HYPERLIPIDEMIA, UNSPECIFIED HYPERLIPIDEMIA TYPE: ICD-10-CM

## 2021-03-03 DIAGNOSIS — Z79.899 HIGH RISK MEDICATION USE: ICD-10-CM

## 2021-03-03 DIAGNOSIS — Z12.5 SCREENING FOR PROSTATE CANCER: ICD-10-CM

## 2021-03-03 LAB
ALBUMIN SERPL BCP-MCNC: 4 G/DL (ref 3.5–5)
ALP SERPL-CCNC: 74 U/L (ref 46–116)
ALT SERPL W P-5'-P-CCNC: 51 U/L (ref 12–78)
ANION GAP SERPL CALCULATED.3IONS-SCNC: 5 MMOL/L (ref 4–13)
AST SERPL W P-5'-P-CCNC: 29 U/L (ref 5–45)
BACTERIA UR QL AUTO: ABNORMAL /HPF
BASOPHILS # BLD AUTO: 0.07 THOUSANDS/ΜL (ref 0–0.1)
BASOPHILS NFR BLD AUTO: 1 % (ref 0–1)
BILIRUB SERPL-MCNC: 0.69 MG/DL (ref 0.2–1)
BILIRUB UR QL STRIP: NEGATIVE
BUN SERPL-MCNC: 19 MG/DL (ref 5–25)
CALCIUM SERPL-MCNC: 9.2 MG/DL (ref 8.3–10.1)
CHLORIDE SERPL-SCNC: 104 MMOL/L (ref 100–108)
CHOLEST SERPL-MCNC: 172 MG/DL (ref 50–200)
CLARITY UR: ABNORMAL
CO2 SERPL-SCNC: 30 MMOL/L (ref 21–32)
COLOR UR: YELLOW
CREAT SERPL-MCNC: 0.91 MG/DL (ref 0.6–1.3)
EOSINOPHIL # BLD AUTO: 0.12 THOUSAND/ΜL (ref 0–0.61)
EOSINOPHIL NFR BLD AUTO: 2 % (ref 0–6)
ERYTHROCYTE [DISTWIDTH] IN BLOOD BY AUTOMATED COUNT: 13 % (ref 11.6–15.1)
ERYTHROCYTE [SEDIMENTATION RATE] IN BLOOD: 16 MM/HOUR (ref 0–19)
GFR SERPL CREATININE-BSD FRML MDRD: 86 ML/MIN/1.73SQ M
GLUCOSE P FAST SERPL-MCNC: 94 MG/DL (ref 65–99)
GLUCOSE UR STRIP-MCNC: NEGATIVE MG/DL
HCT VFR BLD AUTO: 45.9 % (ref 36.5–49.3)
HDLC SERPL-MCNC: 61 MG/DL
HGB BLD-MCNC: 14.8 G/DL (ref 12–17)
HGB UR QL STRIP.AUTO: NEGATIVE
HYALINE CASTS #/AREA URNS LPF: ABNORMAL /LPF
IMM GRANULOCYTES # BLD AUTO: 0.05 THOUSAND/UL (ref 0–0.2)
IMM GRANULOCYTES NFR BLD AUTO: 1 % (ref 0–2)
KETONES UR STRIP-MCNC: NEGATIVE MG/DL
LDLC SERPL CALC-MCNC: 100 MG/DL (ref 0–100)
LEUKOCYTE ESTERASE UR QL STRIP: ABNORMAL
LYMPHOCYTES # BLD AUTO: 3.01 THOUSANDS/ΜL (ref 0.6–4.47)
LYMPHOCYTES NFR BLD AUTO: 40 % (ref 14–44)
MCH RBC QN AUTO: 31.6 PG (ref 26.8–34.3)
MCHC RBC AUTO-ENTMCNC: 32.2 G/DL (ref 31.4–37.4)
MCV RBC AUTO: 98 FL (ref 82–98)
MONOCYTES # BLD AUTO: 0.61 THOUSAND/ΜL (ref 0.17–1.22)
MONOCYTES NFR BLD AUTO: 8 % (ref 4–12)
NEUTROPHILS # BLD AUTO: 3.71 THOUSANDS/ΜL (ref 1.85–7.62)
NEUTS SEG NFR BLD AUTO: 48 % (ref 43–75)
NITRITE UR QL STRIP: NEGATIVE
NON-SQ EPI CELLS URNS QL MICRO: ABNORMAL /HPF
NONHDLC SERPL-MCNC: 111 MG/DL
NRBC BLD AUTO-RTO: 0 /100 WBCS
PH UR STRIP.AUTO: 7 [PH]
PLATELET # BLD AUTO: 265 THOUSANDS/UL (ref 149–390)
PMV BLD AUTO: 10.4 FL (ref 8.9–12.7)
POTASSIUM SERPL-SCNC: 4 MMOL/L (ref 3.5–5.3)
PROT SERPL-MCNC: 7.5 G/DL (ref 6.4–8.2)
PROT UR STRIP-MCNC: NEGATIVE MG/DL
PSA SERPL-MCNC: 0.6 NG/ML (ref 0–4)
RBC # BLD AUTO: 4.68 MILLION/UL (ref 3.88–5.62)
RBC #/AREA URNS AUTO: ABNORMAL /HPF
SODIUM SERPL-SCNC: 139 MMOL/L (ref 136–145)
SP GR UR STRIP.AUTO: 1.02 (ref 1–1.03)
TRIGL SERPL-MCNC: 55 MG/DL
URATE SERPL-MCNC: 5.6 MG/DL (ref 4.2–8)
UROBILINOGEN UR QL STRIP.AUTO: 1 E.U./DL
WBC # BLD AUTO: 7.57 THOUSAND/UL (ref 4.31–10.16)
WBC #/AREA URNS AUTO: ABNORMAL /HPF

## 2021-03-03 PROCEDURE — 80061 LIPID PANEL: CPT

## 2021-03-03 PROCEDURE — 85652 RBC SED RATE AUTOMATED: CPT

## 2021-03-03 PROCEDURE — 81001 URINALYSIS AUTO W/SCOPE: CPT | Performed by: FAMILY MEDICINE

## 2021-03-03 PROCEDURE — 86618 LYME DISEASE ANTIBODY: CPT

## 2021-03-03 PROCEDURE — 80053 COMPREHEN METABOLIC PANEL: CPT

## 2021-03-03 PROCEDURE — G0103 PSA SCREENING: HCPCS

## 2021-03-03 PROCEDURE — 84550 ASSAY OF BLOOD/URIC ACID: CPT

## 2021-03-03 PROCEDURE — 36415 COLL VENOUS BLD VENIPUNCTURE: CPT

## 2021-03-03 PROCEDURE — 85025 COMPLETE CBC W/AUTO DIFF WBC: CPT

## 2021-03-05 LAB — B BURGDOR IGG+IGM SER-ACNC: 37

## 2021-04-26 ENCOUNTER — OFFICE VISIT (OUTPATIENT)
Dept: OBGYN CLINIC | Facility: CLINIC | Age: 71
End: 2021-04-26
Payer: MEDICARE

## 2021-04-26 ENCOUNTER — APPOINTMENT (OUTPATIENT)
Dept: RADIOLOGY | Facility: CLINIC | Age: 71
End: 2021-04-26
Payer: MEDICARE

## 2021-04-26 VITALS
WEIGHT: 233 LBS | HEIGHT: 69 IN | DIASTOLIC BLOOD PRESSURE: 80 MMHG | BODY MASS INDEX: 34.51 KG/M2 | SYSTOLIC BLOOD PRESSURE: 120 MMHG

## 2021-04-26 DIAGNOSIS — M19.042 PRIMARY OSTEOARTHRITIS OF BOTH HANDS: Primary | ICD-10-CM

## 2021-04-26 DIAGNOSIS — M19.041 PRIMARY OSTEOARTHRITIS OF BOTH HANDS: Primary | ICD-10-CM

## 2021-04-26 DIAGNOSIS — M79.642 BILATERAL HAND PAIN: ICD-10-CM

## 2021-04-26 DIAGNOSIS — M79.641 BILATERAL HAND PAIN: ICD-10-CM

## 2021-04-26 PROCEDURE — 73130 X-RAY EXAM OF HAND: CPT

## 2021-04-26 PROCEDURE — 99204 OFFICE O/P NEW MOD 45 MIN: CPT | Performed by: ORTHOPAEDIC SURGERY

## 2021-04-26 RX ORDER — METHYLPREDNISOLONE 4 MG/1
TABLET ORAL
Qty: 1 EACH | Refills: 0 | Status: SHIPPED | OUTPATIENT
Start: 2021-04-26 | End: 2021-09-01 | Stop reason: ALTCHOICE

## 2021-04-26 NOTE — PATIENT INSTRUCTIONS
Advil/Tylenol/Aleve as needed for pain  Tylenol extended release as needed for pain  Volaten Gel  Aspercreme with lidocaine topical  [place] dream topical  4-6 oz of tart cherry juice prior to bedtime  Tumeric   Thenar strengthening exercises      What is it Osteoarthritis of the Hand? Arthritis literally means inflamed joint  Normally a joint consists of two smooth, cartilage-covered bone surfaces that fit together as a matched set and that move smoothly against one other  Arthritis results when these smooth surfaces become irregular and don't fit together well anymore and essentially wear out   Arthritis can affect any joint in the body, but it is most noticeable when it affects the hands and fingers  Each hand has 19 bones, plus 8 small bones and the two forearm bones that form the wrist  Arthritis of the hand can be both painful and disabling  The most common forms of arthritis in the hand are osteoarthritis, post-traumatic arthritis (after an injury), and rheumatoid arthritis  Other causes of arthritis of the hand are infection, gout, and psoriasis  Osteoarthritis of the hand  Osteoarthritis is a degenerative joint disease in which the cushioning cartilage that covers the bone surfaces at the joints begins to wear out  It may be caused by simple wear and tear on joints, or it may develop after an injury to a joint  In the hand, osteoarthritis most often develops in three sites (see Figure 1):   at the base of the thumb, where the thumb and wrist come together (the trapezio-metacarpal, or basilar, joint)   at the end joint closest to the finger tip (the distal interphalangeal or DIP joint)   at the middle joint of a finger (the proximal interphalangeal or PIP joint)  It also often develops in the wrist     Signs and symptoms of arthritis of the hand  Stiffness, swelling, and pain are symptoms common to all forms of arthritis in the hand   With osteoarthritis, bony nodules may develop at the middle, or PIP, joint of the finger (Terri's nodes), and at the end-joints, or DIP, of the finger (Heberden's nodes) (see Figure 2)  A deep, aching pain at the base of the thumb is typical of osteoarthritis of the basilar joint  Swelling and a bump at the base of the thumb where it joins the wrist may also be observed   and pinch strength may be diminished, causing difficulty with activities such as opening jars or turning keys  Pain, swelling, stiffness, and diminished strength are also seen with osteoarthritis of the wrist     How is osteoarthritis diagnosed? Your doctor will examine you and determine whether you have similar symptoms in other joints and assess the impact of the arthritis on your life and activities  The clinical appearance of the hands and fingers helps to diagnose the type of arthritis  X-rays will also show certain characteristics of osteoarthritis, such as narrowing of the joint space, the formation of bony outgrowths (osteophytes or nodes), and the development of dense, hard areas of bone along the joint margins  Treatment  Treatment is designed to relieve pain and restore function  Anti-inflammatory or other analgesic medication may be of benefit in relieving pain  Brief periods of rest may help if the arthritis has flared up  You may also be advised to wear finger or wrist splints at night and for selected activities  Often soft sleeves may be of some benefit when the rigid splints are too restrictive, especially when the arthritis is affecting the joint at the base of your thumb  Heat modalities in the form of warm wax or paraffin baths might help, and when severe swelling is present, cold modalities may be of help  It is important to maintain motion in the fingers and use the hand as productively as possible  Hand therapy is often helpful with these exercises, splints, and modalities  A cortisone injection can often provide relief of symptoms, but does not cure the arthritis   Surgery is usually not advised unless these more conservative treatments fail  Surgery is indicated when the patient either has too much pain or too little function  In most cases, the patient knows best and actually tells the doctor when it is time for surgery  The goal is to restore as much function as possible and to eliminate the pain or reduce it to a tolerable level  One type of surgery is joint fusion, in which the arthritic surface is removed and the bones on each side of the joint are fused together, eliminating motion from the problem joint  Joint fusion may be used to relieve pain and correct deformities that interfere with functioning  Another approach is joint reconstruction, in which the degenerated joint surface is removed in order to eliminate the rough, irregular bone-to-bone contact that causes pain and restricts motion  Once the degenerated portion of the joint surface is removed, it may be replaced with rolled-up soft tissue, such as a tendon, or with a joint replacement implant  Which type of surgery is used depends on the particular joint(s) involved, your activities, and your own needs  Your hand surgeon can help you decide which type of surgery is the most appropriate for you  © [employer]  www handcare  org

## 2021-04-26 NOTE — PROGRESS NOTES
ASSESSMENT/PLAN:    Assessment:   Bilateral hand OA     Plan:   Advil/Tylenol/Aleve as needed for pain  Tylenol extended release as needed for pain  Volaten Gel  Aspercreme with lidocaine topical  [place] dream topical  4-6 oz of tart cherry juice prior to bedtime  Tumeric   Thenar strengthening exercises    We will send a Medrol Dosepak to the patient's pharmacy today  Follow Up:  PRN    To Do Next Visit:       General Discussions:     Osteoarthritis:  The anatomy and physiology of osteoarthritis was discussed with the patient today in the office  Deterioration of the articular cartilage eventually leads to altered mobility at the joint, resulting in joint subluxation, osteophyte formation, cystic changes, as well as subchondral sclerosis  Eventually, pain, limited mobility, and compensatory hypermobility at surrounding joints may develop  While normal activity and usage of the joint may provide a painful experience to the patient, this typically does not result in damage to the limb  Treatment options include splints to decreased joint edema, pain, and inflammation  Therapy exercises to strengthen the surrounding musculature may relieve pain, but do not alter the overall continued development of osteoarthritis  Oral medications, topical medications, corticosteroid injections may decrease pain and increase overall function  Eventually, some patients may require surgical intervention  Operative Discussions:       _____________________________________________________  CHIEF COMPLAINT:  Chief Complaint   Patient presents with    Left Hand - Pain     S/P Release Carpal Tunnel Left wrist 01/24/2018    Right Hand - Pain     S/P Carpal Tunnel Release DOS 08/26/2016         SUBJECTIVE:  Najma Thompson is a 80 y o  male who presents with Pain  Mild  Constant  Aching and Stiffness/LROM to the bilateral hand  This started  1 year(s) ago as Insidious      Radiation: Yes to the  bilateral hands  Previous Treatments: NSAIDs, Tylenol and voltaren gel with only partial relief  Associated symptoms: No Complaints  Handedness: right  Work status: retired     PAST MEDICAL HISTORY:  Past Medical History:   Diagnosis Date    AC joint arthropathy     right    BPH (benign prostatic hyperplasia)     Carpal tunnel syndrome     bilateral    Chronic prostatitis     last assessed: 3/1/2014    Chronic UTI (urinary tract infection)     Colon polyp     Diverticulitis     twice    Ganglion     last assessed: 10/7/2013    GERD (gastroesophageal reflux disease)     GERD without esophagitis     Hyperlipidemia     Impingement syndrome of right shoulder     last assessed: 3/22/2016    Left inguinal hernia     description: repaired by Dr Tray Avilez Peyronie's disease     had surgery    Pneumonia of left upper lobe due to infectious organism     last assessed: 2/8/2016    Sexual dysfunction     Stomach disorder     Wears glasses        PAST SURGICAL HISTORY:  Past Surgical History:   Procedure Laterality Date    COLONOSCOPY      COLONOSCOPY      CYSTOSCOPY      Diagnostic last assessed: 6/12/2014    CYSTOSTOMY      urethral stricture    HERNIA REPAIR      mesh left inguinal    NEUROPLASTY / TRANSPOSITION MEDIAN NERVE AT CARPAL TUNNEL      ND ANKLE SCOPE,PLANTAR FASCIOTOMY Left 11/18/2016    Procedure: PLANTAR FASCIOTOMY;  Surgeon: Brooke Harris DPM;  [facility];  Service: Podiatry    ND CYSTOURETHRO W/IMPLANT N/A 6/23/2020    Procedure: Marlene Hague DILATION,  WITH INSERTION Bharathi Tanner;  Surgeon: Marcia Hunter MD;  [facility];  Service: Urology    ND EXCIS PRIMARY GANGLION WRIST Left 1/24/2018    Procedure: WRIST RESECTION VOLAR GANGLION CYST;  Surgeon: Willard Hoang MD;  [facility];  Service: Orthopedics    ND REVISE MEDIAN N/CARPAL TUNNEL SURG Right 8/24/2016    Procedure: CARPAL TUNNEL RELEASE ;  Surgeon: Willard Hoang MD;  [facility];  Service: Orthopedics    ND REVISE MEDIAN N/CARPAL TUNNEL SURG Left 2018    Procedure: RELEASE CARPAL TUNNEL;  Surgeon: Willard Hoang MD;  [facility];  Service: Orthopedics       FAMILY HISTORY:  Family History   Problem Relation Age of Onset    Cancer Father         bladder    Colon polyps Neg Hx     Colon cancer Neg Hx        SOCIAL HISTORY:  Social History     Tobacco Use    Smoking status: Former Smoker     Packs/day: 1 50     Years: 45 00     Pack years: 67 50     Types: Cigarettes     Start date:      Quit date:      Years since quittin 3    Smokeless tobacco: Never Used   Substance Use Topics    Alcohol use: Yes     Frequency: 4 or more times a week     Comment: social and denies alcohol use causing problems     Drug use: No       MEDICATIONS:    Current Outpatient Medications:     Cholecalciferol (VITAMIN D PO), Take by mouth, Disp: , Rfl:     Coenzyme Q10 (CO Q 10 PO), Take 200 mg by mouth, Disp: , Rfl:     diphenhydrAMINE (BENADRYL) 50 mg capsule, Take 50 mg by mouth every 6 (six) hours as needed for itching, Disp: , Rfl:     esomeprazole (NexIUM) 40 MG capsule, Take 1 capsule (40 mg total) by mouth daily, Disp: 90 capsule, Rfl: 1    simvastatin (ZOCOR) 40 mg tablet, Take 1 tablet (40 mg total) by mouth daily at bedtime, Disp: 90 tablet, Rfl: 1    ALLERGIES:  No Known Allergies    REVIEW OF SYSTEMS:  Pertinent items are noted in HPI      LABS:  HgA1c:   Lab Results   Component Value Date    HGBA1C 5 7 (H) 2013     BMP:   Lab Results   Component Value Date    GLUCOSE 105 2015    CALCIUM 9 2 2021     2015    K 4 0 2021    CO2 30 2021     2021    BUN 19 2021    CREATININE 0 91 2021         _____________________________________________________  PHYSICAL EXAMINATION:  Vital signs: /80   Ht 5' 9" (1 753 m)   Wt 106 kg (233 lb)   BMI 34 41 kg/m²   General: well developed and well nourished, alert, oriented times 3 and appears comfortable  Psychiatric: Normal  HEENT: Trachea Midline, No torticollis  Cardiovascular: No discernable arrhythmia  Pulmonary: No wheezing or stridor  Skin: No masses, erythema, lacerations, fluctation, ulcerations  Neurovascular: Sensation Intact to the Median, Ulnar, Radial Nerve, Motor Intact to the Median, Ulnar, Radial Nerve and Pulses Intact    MUSCULOSKELETAL EXAMINATION:  Left hand:   no triggering  Minimal intrinsic tightness to all fingers  Heberden nodules to index ring and small finger  ttp all pip jts   Full finger ROM  Flexor and extensor tendons intact    Right hand:  No triggering  Heberden nodules to index and small finger   Full finger ROM  Flexor and extensor tendons intact   _____________________________________________________  STUDIES REVIEWED:  Images were reviewed in PACS by Dr Keyur Brown and demonstrate: xray of right hand on 4/26/2021 demonstrates mild OA throughout hand  Xray of left hand on 4/26/2021 demonstrates mild OA thoughout hand         PROCEDURES PERFORMED:  Procedures  No Procedures performed today   Scribe Attestation    I,:  Delfina Childers am acting as a scribe while in the presence of the attending physician :       I,:  Leopold Mitts, MD personally performed the services described in this documentation    as scribed in my presence :

## 2021-05-03 DIAGNOSIS — E78.5 HYPERLIPIDEMIA, UNSPECIFIED HYPERLIPIDEMIA TYPE: ICD-10-CM

## 2021-05-03 RX ORDER — SIMVASTATIN 40 MG
40 TABLET ORAL
Qty: 90 TABLET | Refills: 1 | Status: SHIPPED | OUTPATIENT
Start: 2021-05-03 | End: 2021-08-02 | Stop reason: SDUPTHER

## 2021-06-07 DIAGNOSIS — K21.9 GASTROESOPHAGEAL REFLUX DISEASE WITHOUT ESOPHAGITIS: ICD-10-CM

## 2021-06-07 RX ORDER — ESOMEPRAZOLE MAGNESIUM 40 MG/1
40 CAPSULE, DELAYED RELEASE ORAL DAILY
Qty: 90 CAPSULE | Refills: 1 | Status: SHIPPED | OUTPATIENT
Start: 2021-06-07 | End: 2021-11-29 | Stop reason: SDUPTHER

## 2021-07-14 ENCOUNTER — OFFICE VISIT (OUTPATIENT)
Dept: FAMILY MEDICINE CLINIC | Facility: CLINIC | Age: 71
End: 2021-07-14
Payer: MEDICARE

## 2021-07-14 VITALS
HEIGHT: 69 IN | RESPIRATION RATE: 18 BRPM | TEMPERATURE: 97.7 F | WEIGHT: 238.2 LBS | DIASTOLIC BLOOD PRESSURE: 86 MMHG | OXYGEN SATURATION: 98 % | BODY MASS INDEX: 35.28 KG/M2 | SYSTOLIC BLOOD PRESSURE: 130 MMHG | HEART RATE: 68 BPM

## 2021-07-14 DIAGNOSIS — Z00.00 MEDICARE ANNUAL WELLNESS VISIT, SUBSEQUENT: Primary | ICD-10-CM

## 2021-07-14 DIAGNOSIS — E66.01 OBESITY, MORBID (HCC): ICD-10-CM

## 2021-07-14 DIAGNOSIS — Z23 NEED FOR SHINGLES VACCINE: ICD-10-CM

## 2021-07-14 DIAGNOSIS — Z11.52 ENCOUNTER FOR SCREENING FOR COVID-19: ICD-10-CM

## 2021-07-14 PROCEDURE — 1123F ACP DISCUSS/DSCN MKR DOCD: CPT | Performed by: FAMILY MEDICINE

## 2021-07-14 PROCEDURE — G0439 PPPS, SUBSEQ VISIT: HCPCS | Performed by: FAMILY MEDICINE

## 2021-07-14 NOTE — PATIENT INSTRUCTIONS

## 2021-07-14 NOTE — PROGRESS NOTES
Assessment/Plan:     Diagnoses and all orders for this visit:    Medicare annual wellness visit, subsequent    Need for shingles vaccine  -     Zoster Vac Recomb Adjuvanted 50 MCG/0 5ML SUSR; Inject 1 Dose into a muscle once for 1 dose    Obesity, morbid ([address] )    Encounter for screening for COVID-19  -     SARS-CoV2 Antibody, Total (IgG, IgA, IgM) [facility]- Lab Collect; Future       Script for shingles vaccine given   Patient still declining a COVID vaccination  COVID antibodies ordered   Discussed diet exercise and weight loss  His blood pressure is well controlled today   He can follow up with me in 6 months or sooner if needed      Subjective:     Chief Complaint   Patient presents with    Medicare Wellness Visit     subsequent   Joselo Massey     patient requesting covid antibodies test        Patient ID: Yojana Reid is a 80 y o  male  Here for medicare wellness visit  He has no acute complaints today   He is requesting COVID antibodies as he believes he had before the pandemic began  He is also debating on a shingles vaccine but would like a script for that today      The following portions of the patient's history were reviewed and updated as appropriate: allergies, current medications, past family history, past medical history, past social history, past surgical history and problem list     Review of Systems   Constitutional: Negative  HENT: Negative  Eyes: Negative  Respiratory: Negative  Cardiovascular: Negative  Gastrointestinal: Negative  Endocrine: Negative  Genitourinary: Negative  Musculoskeletal: Negative  Skin: Negative  Allergic/Immunologic: Negative  Neurological: Negative  Hematological: Negative  Psychiatric/Behavioral: Negative  All other systems reviewed and are negative          Objective:    Vitals:    07/14/21 0937   BP: 130/86   BP Location: Left arm   Patient Position: Sitting   Cuff Size: Large   Pulse: 68   Resp: 18   Temp: 97 7 °F (36 5 °C) TempSrc: Tympanic   SpO2: 98%   Weight: 108 kg (238 lb 3 2 oz)   Height: 5' 9" (1 753 m)          Physical Exam  Vitals and nursing note reviewed  Constitutional:       General: He is not in acute distress  Appearance: He is well-developed  HENT:      Head: Normocephalic  Right Ear: External ear normal       Left Ear: External ear normal       Nose: Nose normal    Eyes:      General:         Right eye: No discharge  Left eye: No discharge  Conjunctiva/sclera: Conjunctivae normal       Pupils: Pupils are equal, round, and reactive to light  Cardiovascular:      Rate and Rhythm: Normal rate and regular rhythm  Heart sounds: Normal heart sounds  Pulmonary:      Effort: Pulmonary effort is normal       Breath sounds: Normal breath sounds  Abdominal:      General: Bowel sounds are normal  There is no distension  Palpations: Abdomen is soft  Tenderness: There is no abdominal tenderness  Musculoskeletal:         General: Normal range of motion  Cervical back: Normal range of motion  Skin:     General: Skin is warm and dry  Findings: No rash  Neurological:      Mental Status: He is alert and oriented to person, place, and time  Cranial Nerves: No cranial nerve deficit  Psychiatric:         Behavior: Behavior normal          Thought Content: Thought content normal          Judgment: Judgment normal           Assessment and Plan:     Problem List Items Addressed This Visit        Other    Obesity, morbid ([address] )      Other Visit Diagnoses     Medicare annual wellness visit, subsequent    -  Primary    Need for shingles vaccine        Relevant Medications    Zoster Vac Recomb Adjuvanted 48 MCG/0 5ML SUSR    Encounter for screening for COVID-19        Relevant Orders    SARS-CoV2 Antibody, Total (IgG, IgA, IgM) [facility]- Lab Collect        BMI Counseling: Body mass index is 35 18 kg/m²   The BMI is above normal  Nutrition recommendations include decreasing portion sizes, encouraging healthy choices of fruits and vegetables, decreasing fast food intake, consuming healthier snacks, limiting drinks that contain sugar, moderation in carbohydrate intake, increasing intake of lean protein, reducing intake of saturated and trans fat and reducing intake of cholesterol  Exercise recommendations include exercising 3-5 times per week  Preventive health issues were discussed with patient, and age appropriate screening tests were ordered as noted in patient's After Visit Summary  Personalized health advice and appropriate referrals for health education or preventive services given if needed, as noted in patient's After Visit Summary       History of Present Illness:     Patient presents for Medicare Annual Wellness visit    Patient Care Team:  Vargas Olson DO as PCP - General (Family Medicine)  MD Deandra Olivares MD Tia Carrie, MD     Problem List:     Patient Active Problem List   Diagnosis    Carpal tunnel syndrome of left wrist    Tendinitis of left rotator cuff    Biceps tendonitis on left    Benign colon polyp    Hyperlipidemia    Preop examination    Peyronie's disease    GERD without esophagitis    Former smoker -quit 2014    UTI (urinary tract infection)    Frequent urination    Postprocedural anterior urethral stricture    Other urethral stricture, male, unspecified site    [address]interstitial [address] disease) [address] [address]    Obesity, morbid [address] [address] )      Past Medical and Surgical History:     Past Medical History:   Diagnosis Date    AC joint arthropathy     right    BPH (benign prostatic hyperplasia)     Carpal tunnel syndrome     bilateral    Chronic prostatitis     last assessed: 3/1/2014    Chronic UTI (urinary tract infection)     Colon polyp     Diverticulitis     twice    Ganglion     last assessed: 10/7/2013    GERD (gastroesophageal reflux disease)     GERD without esophagitis     Hyperlipidemia     Impingement syndrome of right shoulder     last assessed: 3/22/2016    Left inguinal hernia     description: repaired by Dr Rashaad Zhou Peyronie's disease     had surgery    Pneumonia of left upper lobe due to infectious organism     last assessed: 2/8/2016    Sexual dysfunction     Stomach disorder     Wears glasses      Past Surgical History:   Procedure Laterality Date    COLONOSCOPY      COLONOSCOPY      CYSTOSCOPY      Diagnostic last assessed: 6/12/2014    CYSTOSTOMY      urethral stricture    HERNIA REPAIR      mesh left inguinal    NEUROPLASTY / TRANSPOSITION MEDIAN NERVE AT CARPAL TUNNEL      NJ ANKLE SCOPE,PLANTAR FASCIOTOMY Left 11/18/2016    Procedure: PLANTAR FASCIOTOMY;  Surgeon: Chu Shabazz DPM;  [facility];  Service: Podiatry    NJ CYSTOURETHRO W/IMPLANT N/A 6/23/2020    Procedure: Coulter Beat,  WITH INSERTION Caty Maria Del Carmen;  Surgeon: Dalton Spencer MD;  [facility];  Service: Urology    NJ EXCIS PRIMARY GANGLION WRIST Left 1/24/2018    Procedure: WRIST RESECTION VOLAR GANGLION CYST;  Surgeon: Maci Peterson MD;  [facility];  Service: Orthopedics    NJ REVISE MEDIAN N/CARPAL TUNNEL SURG Right 8/24/2016    Procedure: CARPAL TUNNEL RELEASE ;  Surgeon: Maci Peterson MD;  [facility];  Service: Orthopedics    NJ REVISE MEDIAN N/CARPAL TUNNEL SURG Left 1/24/2018    Procedure: RELEASE CARPAL TUNNEL;  Surgeon: Maci Peterson MD;  [facility];  Service: Orthopedics      Family History:     Family History   Problem Relation Age of Onset    Cancer Father         bladder    Colon polyps Neg Hx     Colon cancer Neg Hx       Social History:     Social History     Socioeconomic History    Marital status:      Spouse name: None    Number of children: 1    Years of education: None    Highest education level: None   Occupational History    Occupation: Working full time    Tobacco Use    Smoking status: Former Smoker     Packs/day: 1 50     Years: 45 00 Pack years: 67 50     Types: Cigarettes     Start date: 57     Quit date: 2014     Years since quittin 5    Smokeless tobacco: Never Used   Vaping Use    Vaping Use: Never used   Substance and Sexual Activity    Alcohol use: Yes     Comment: social and denies alcohol use causing problems     Drug use: No    Sexual activity: Not Currently   Other Topics Concern    None   Social History Narrative    None     Social Determinants of Health     Financial Resource Strain: Low Risk     Difficulty of Paying Living Expenses: Not hard at all   Food Insecurity: No Food Insecurity    Worried About Running Out of Food in the Last Year: Never true    Gladys of Food in the Last Year: Never true   Transportation Needs: No Transportation Needs    Lack of Transportation (Medical): No    Lack of Transportation (Non-Medical):  No   Physical Activity:     Days of Exercise per Week:     Minutes of Exercise per Session:    Stress:     Feeling of Stress :    Social Connections:     Frequency of Communication with Friends and Family:     Frequency of Social Gatherings with Friends and Family:     Attends Adventism Services:     Active Member of Clubs or Organizations:     Attends Club or Organization Meetings:     Marital Status:    Intimate Partner Violence: Not At Risk    Fear of Current or Ex-Partner: No    Emotionally Abused: No    Physically Abused: No    Sexually Abused: No      Medications and Allergies:     Current Outpatient Medications   Medication Sig Dispense Refill    Cholecalciferol (VITAMIN D PO) Take by mouth      Coenzyme Q10 (CO Q 10 PO) Take 200 mg by mouth      diphenhydrAMINE (BENADRYL) 50 mg capsule Take 50 mg by mouth every 6 (six) hours as needed for itching      esomeprazole (NexIUM) 40 MG capsule Take 1 capsule (40 mg total) by mouth daily 90 capsule 1    simvastatin (ZOCOR) 40 mg tablet Take 1 tablet (40 mg total) by mouth daily at bedtime 90 tablet 1    methylPREDNISolone 4 MG tablet therapy pack Use as directed on package (Patient not taking: Reported on 7/14/2021) 1 each 0    Zoster Vac Recomb Adjuvanted 50 MCG/0 5ML SUSR Inject 1 Dose into a muscle once for 1 dose 1 each 0     No current facility-administered medications for this visit  Allergies   Allergen Reactions    Pollen Extract Allergic Rhinitis      Immunizations:     Immunization History   Administered Date(s) Administered    Pneumococcal Conjugate 13-Valent 09/20/2018    Pneumococcal Polysaccharide PPV23 04/06/2016    Tdap 04/21/2012    Zoster 09/08/2014      Health Maintenance:         Topic Date Due    Lung Cancer Screening  11/04/2021    Colorectal Cancer Screening  01/27/2031    Hepatitis C Screening  Completed         Topic Date Due    COVID-19 Vaccine (1) Never done    Influenza Vaccine (1) 09/01/2021      Medicare Health Risk Assessment:     /86 (BP Location: Left arm, Patient Position: Sitting, Cuff Size: Large)   Pulse 68   Temp 97 7 °F (36 5 °C) (Tympanic)   Resp 18   Ht 5' 9" (1 753 m)   Wt 108 kg (238 lb 3 2 oz)   SpO2 98%   BMI 35 18 kg/m²      Nahid Blackwood is here for his Subsequent Wellness visit  Last Medicare Wellness visit information reviewed, patient interviewed and updates made to the record today  Health Risk Assessment:   Patient rates overall health as good  Patient feels that their physical health rating is same  Patient is satisfied with their life  Eyesight was rated as same  Hearing was rated as same  Patient feels that their emotional and mental health rating is same  Patients states they are never, rarely angry  Patient states they are never, rarely unusually tired/fatigued  Pain experienced in the last 7 days has been none  Patient states that he has experienced no weight loss or gain in last 6 months  Depression Screening:   PHQ-2 Score: 0      Fall Risk Screening:    In the past year, patient has experienced: no history of falling in past year      Home Safety:  Patient does not have trouble with stairs inside or outside of their home  Patient has working smoke alarms and has working carbon monoxide detector  Home safety hazards include: none  Nutrition:   Current diet is Regular  Medications:   Patient is currently taking over-the-counter supplements  OTC medications include: see medication list  Patient is able to manage medications  Activities of Daily Living (ADLs)/Instrumental Activities of Daily Living (IADLs):   Walk and transfer into and out of bed and chair?: Yes  Dress and groom yourself?: Yes    Bathe or shower yourself?: Yes    Feed yourself? Yes  Do your laundry/housekeeping?: Yes  Manage your money, pay your bills and track your expenses?: Yes  Make your own meals?: Yes    Do your own shopping?: Yes    Previous Hospitalizations:   Any hospitalizations or ED visits within the last 12 months?: No      Advance Care Planning:   Living will: Yes    Durable POA for healthcare:  Yes    Advanced directive: Yes    Advanced directive counseling given: Yes    Five wishes given: No    End of Life Decisions reviewed with patient: Yes    Provider agrees with end of life decisions: Yes      Cognitive Screening:   Provider or family/friend/caregiver concerned regarding cognition?: No    PREVENTIVE SCREENINGS      Cardiovascular Screening:    General: History Lipid Disorder and Screening Current      Diabetes Screening:     General: Screening Current      Colorectal Cancer Screening:     General: Screening Current      Prostate Cancer Screening:    General: Screening Current      Osteoporosis Screening:    General: Screening Not Indicated      Abdominal Aortic Aneurysm (AAA) Screening:    Risk factors include: age between 73-69 yo and tobacco use        Lung Cancer Screening:     General: Screening Current      Hepatitis C Screening:    General: Screening Current    Screening, Brief Intervention, and Referral to Treatment (SBIRT)    Screening  Typical number of drinks in a day: 0    Single Item Drug Screening:  How often have you used an illegal drug (including marijuana) or a prescription medication for non-medical reasons in the past year? never    Single Item Drug Screen Score: 0  Interpretation: Negative screen for possible drug use disorder    Brief Intervention  Alcohol & drug use screenings were reviewed  No concerns regarding substance use disorder identified  Other Counseling Topics:   Car/seat belt/driving safety, skin self-exam, sunscreen and regular weightbearing exercise and calcium and vitamin D intake         Chase Page, DO

## 2021-07-26 ENCOUNTER — OFFICE VISIT (OUTPATIENT)
Dept: UROLOGY | Facility: AMBULATORY SURGERY CENTER | Age: 71
End: 2021-07-26
Payer: MEDICARE

## 2021-07-26 VITALS
HEART RATE: 80 BPM | WEIGHT: 241 LBS | SYSTOLIC BLOOD PRESSURE: 130 MMHG | HEIGHT: 69 IN | DIASTOLIC BLOOD PRESSURE: 80 MMHG | BODY MASS INDEX: 35.7 KG/M2

## 2021-07-26 DIAGNOSIS — N40.1 BENIGN PROSTATIC HYPERPLASIA WITH LOWER URINARY TRACT SYMPTOMS, SYMPTOM DETAILS UNSPECIFIED: Primary | ICD-10-CM

## 2021-07-26 DIAGNOSIS — Z12.5 SCREENING FOR PROSTATE CANCER: ICD-10-CM

## 2021-07-26 LAB — POST-VOID RESIDUAL VOLUME, ML POC: 0 ML

## 2021-07-26 PROCEDURE — 51798 US URINE CAPACITY MEASURE: CPT | Performed by: NURSE PRACTITIONER

## 2021-07-26 PROCEDURE — 99213 OFFICE O/P EST LOW 20 MIN: CPT | Performed by: NURSE PRACTITIONER

## 2021-07-26 NOTE — PROGRESS NOTES
7/26/2021    Patrici Hence  1950  [id number]      Assessment  -BPH s/p Urolift and urethral dilation (6/2020)    Discussion/Plan  Jacque Venegas is a 80 y o  male being managed by Dr Mckeon Minus      1  BPH s/p Urolift and urethral dilation (6/2020)-   PVR in the office today is 0 mL  He is doing well without any urinary complaints in pleased since UroLift procedure  His last PSA from March 2021 was 0 6  Recommend follow-up in 1 year with PSA, PRABHAKAR, and PVR assessment  He was instructed to call sooner with any issues     -All questions answered, patient agrees with plan      History of Present Illness  80 y o  male with a history of BPH presents today for follow up  Patient last seen in the office in November 2020  He underwent Urolift insertion in June 2020  He is overall pleased with his urinary pattern and reports an improvement since procedure  He denies any gross hematuria or dysuria  His last PSA from March 2021 was 0 6  Patient denies any family history of prostate cancer, but states his father did have bladder cancer  Patient denies any additional changes to his overall health  Review of Systems  Review of Systems   Constitutional: Negative  HENT: Negative  Respiratory: Negative  Cardiovascular: Negative  Gastrointestinal: Negative  Genitourinary: Negative for decreased urine volume, difficulty urinating, dysuria, flank pain, frequency, hematuria and urgency  Musculoskeletal: Negative  Skin: Negative  Neurological: Negative  Psychiatric/Behavioral: Negative  AUA SYMPTOM SCORE      Most Recent Value   AUA SYMPTOM SCORE   How often have you had a sensation of not emptying your bladder completely after you finished urinating? 1   How often have you had to urinate again less than two hours after you finished urinating?   1   How often have you found you stopped and started again several times when you urinate?  0   How often have you found it difficult to postpone urination? 0   How often have you had a weak urinary stream?  0   How often have you had to push or strain to begin urination? 0   How many times did you most typically get up to urinate from the time you went to bed at night until the time you got up in the morning?   1   Quality of Life: If you were to spend the rest of your life with your urinary condition just the way it is now, how would you feel about that?  1   AUA SYMPTOM SCORE  3          Past Medical History  Past Medical History:   Diagnosis Date    AC joint arthropathy     right    BPH (benign prostatic hyperplasia)     Carpal tunnel syndrome     bilateral    Chronic prostatitis     last assessed: 3/1/2014    Chronic UTI (urinary tract infection)     Colon polyp     Diverticulitis     twice    Ganglion     last assessed: 10/7/2013    GERD (gastroesophageal reflux disease)     GERD without esophagitis     Hyperlipidemia     Impingement syndrome of right shoulder     last assessed: 3/22/2016    Left inguinal hernia     description: repaired by Dr Siddharth Mayer 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    Pneumonia of left upper lobe due to infectious organism     last assessed: 2/8/2016    Sexual dysfunction     Stomach disorder     Wears glasses        Past Social History  Past Surgical History:   Procedure Laterality Date    COLONOSCOPY      COLONOSCOPY      CYSTOSCOPY      Diagnostic last assessed: 6/12/2014    CYSTOSTOMY      urethral stricture    HERNIA REPAIR      mesh left inguinal    NEUROPLASTY / TRANSPOSITION MEDIAN NERVE AT CARPAL TUNNEL      SC ANKLE SCOPE,PLANTAR FASCIOTOMY Left 11/18/2016    Procedure: PLANTAR FASCIOTOMY;  Surgeon: Nga Durand DPM;  [facility];  Service: Podiatry    SC CYSTOURETHRO W/IMPLANT N/A 6/23/2020    Procedure: Bennie Tai,  WITH INSERTION Ovi Kwong;  Surgeon: Forest Ortega MD;  [facility];  Service: Urology    SC EXCIS PRIMARY GANGLION WRIST Left 1/24/2018 Procedure: WRIST RESECTION VOLAR GANGLION CYST;  Surgeon: Amira Alberto MD;  [facility];  Service: Orthopedics    NM REVISE MEDIAN N/CARPAL TUNNEL SURG Right 2016    Procedure: CARPAL TUNNEL RELEASE ;  Surgeon: Amira Alberto MD;  [facility];  Service: Orthopedics    NM REVISE MEDIAN N/CARPAL TUNNEL SURG Left 2018    Procedure: RELEASE CARPAL TUNNEL;  Surgeon: Amira Alberto MD;  [facility];  Service: Orthopedics       Past Family History  Family History   Problem Relation Age of Onset    Cancer Father         bladder    Colon polyps Neg Hx     Colon cancer Neg Hx        Past Social history  Social History     Socioeconomic History    Marital status:      Spouse name: Not on file    Number of children: 1    Years of education: Not on file    Highest education level: Not on file   Occupational History    Occupation: Working full time    Tobacco Use    Smoking status: Former Smoker     Packs/day: 1 50     Years: 45 00     Pack years: 67 50     Types: Cigarettes     Start date:      Quit date:      Years since quittin 5    Smokeless tobacco: Never Used   Vaping Use    Vaping Use: Never used   Substance and Sexual Activity    Alcohol use: Yes     Comment: social and denies alcohol use causing problems     Drug use: No    Sexual activity: Not Currently   Other Topics Concern    Not on file   Social History Narrative    Not on file     Social Determinants of Health     Financial Resource Strain: Low Risk     Difficulty of Paying Living Expenses: Not hard at all   Food Insecurity: No Food Insecurity    Worried About Running Out of Food in the Last Year: Never true    Gladys of Food in the Last Year: Never true   Transportation Needs: No Transportation Needs    Lack of Transportation (Medical): No    Lack of Transportation (Non-Medical):  No   Physical Activity:     Days of Exercise per Week:     Minutes of Exercise per Session:    Stress:     Feeling of Stress :    Social Connections:     Frequency of Communication with Friends and Family:     Frequency of Social Gatherings with Friends and Family:     Attends Baptism Services:     Active Member of Clubs or Organizations:     Attends Club or Organization Meetings:     Marital Status:    Intimate Partner Violence: Not At Risk    Fear of Current or Ex-Partner: No    Emotionally Abused: No    Physically Abused: No    Sexually Abused: No       Current Medications  Current Outpatient Medications   Medication Sig Dispense Refill    Cholecalciferol (VITAMIN D PO) Take by mouth      Coenzyme Q10 (CO Q 10 PO) Take 200 mg by mouth      diphenhydrAMINE (BENADRYL) 50 mg capsule Take 50 mg by mouth every 6 (six) hours as needed for itching      esomeprazole (NexIUM) 40 MG capsule Take 1 capsule (40 mg total) by mouth daily 90 capsule 1    simvastatin (ZOCOR) 40 mg tablet Take 1 tablet (40 mg total) by mouth daily at bedtime 90 tablet 1    methylPREDNISolone 4 MG tablet therapy pack Use as directed on package (Patient not taking: Reported on 7/14/2021) 1 each 0     No current facility-administered medications for this visit  Allergies  Allergies   Allergen Reactions    Pollen Extract Allergic Rhinitis       Past Medical History, Social History, Family History, medications and allergies were reviewed  Vitals  Vitals:    07/26/21 1400   BP: 130/80   Pulse: 80   Weight: 109 kg (241 lb)   Height: 5' 9" (1 753 m)       Physical Exam  Physical Exam  Constitutional:       Appearance: Normal appearance  He is well-developed  HENT:      Head: Normocephalic  Eyes:      Pupils: Pupils are equal, round, and reactive to light  Pulmonary:      Effort: Pulmonary effort is normal    Abdominal:      Palpations: Abdomen is soft  Musculoskeletal:         General: Normal range of motion  Cervical back: Normal range of motion  Skin:     General: Skin is warm and dry     Neurological:      General: No focal deficit present  Mental Status: He is alert and oriented to person, place, and time  Psychiatric:         Mood and Affect: Mood normal          Behavior: Behavior normal          Thought Content:  Thought content normal          Judgment: Judgment normal          Results    I have personally reviewed all pertinent lab results and reviewed with patient  Lab Results   Component Value Date    PSA 0 6 03/03/2021    PSA 1 0 11/09/2020    PSA 1 9 10/03/2019     Lab Results   Component Value Date    GLUCOSE 105 03/11/2015    CALCIUM 9 2 03/03/2021     03/11/2015    K 4 0 03/03/2021    CO2 30 03/03/2021     03/03/2021    BUN 19 03/03/2021    CREATININE 0 91 03/03/2021     Lab Results   Component Value Date    WBC 7 57 03/03/2021    HGB 14 8 03/03/2021    HCT 45 9 03/03/2021    MCV 98 03/03/2021     03/03/2021     Recent Results (from the past 1 hour(s))   POCT Measure PVR    Collection Time: 07/26/21  2:04 PM   Result Value Ref Range    POST-VOID RESIDUAL VOLUME, ML POC 0 mL

## 2021-07-26 NOTE — PATIENT INSTRUCTIONS
and frequency   Limit the amount of alcohol you drink  Alcohol increases urine production and makes it difficult for the brain to coordinate bladder control   Avoid constipation by maintaining a balanced diet of dietary fiber   Cigarette smoking is also irritating to the bladder surface and is associated with bladder cancer  In addition, the coughing associated with smoking may lead to increased incontinent episodes because of the increased pressure  HOW DIET CAN AFFECT YOUR BLADDER  Although there is no particular "diet" that can cure bladder control, there are certain dietary suggestions you can use to help control the problem  There are 2 points to consider when evaluating how your habits and diet may affect your bladder:    1  Foods and fluids can irritate the bladder  Some foods and beverages are thought to contribute to bladder leakage and irritability  However their effect on the bladder is not completely understood and you may want to see if eliminating one or all of these items improves your bladder control  If you are unable to give them up completely, it is recommended that you use the following items in moderation:   Acidic beverages and foods (orange juice, grapefruit juice, lemonade etc)   Alcoholic beverages   Vinegar   Coffee (regular and decaf)   Tea (regular and decaf)   Caffeinated beverages   Carbonated beverages          2  Drinking enough and the right kinds of fluids  Many people with bladder control issues decrease their intake of liquids in hope that they will need to urinate less frequently or have less urinary leakage   You should not restrict fluids to control your bladder  While a decrease in liquid intake does result in a decrease in the volume of urine, the smaller amount of urine may be more highly concentrated         Highly concentrated, dark yellow urine is irritating to the bladder surface and may actually cause you to go to the bathroom more frequently   It also encourages the growth of bacteria, which may lead to infections resulting in incontinence   Substitutions for Bladder Irritants: water is always the best beverage choice    Grape and apple juice are thirst quenchers are good selections and are not as irritating to the bladder   o Low acid fruits:  Pears, apricots, papaya, watermelon  o For coffee drinkers: KAVA®, Postum®, Amanda®, Kaffree Alethea®  o For tea drinkers:  non-citrus or herbal and sun brewed tea

## 2021-08-02 DIAGNOSIS — E78.5 HYPERLIPIDEMIA, UNSPECIFIED HYPERLIPIDEMIA TYPE: ICD-10-CM

## 2021-08-02 RX ORDER — SIMVASTATIN 40 MG
40 TABLET ORAL
Qty: 90 TABLET | Refills: 1 | Status: SHIPPED | OUTPATIENT
Start: 2021-08-02 | End: 2021-11-03 | Stop reason: SDUPTHER

## 2021-08-04 ENCOUNTER — APPOINTMENT (OUTPATIENT)
Dept: LAB | Facility: CLINIC | Age: 71
End: 2021-08-04
Payer: MEDICARE

## 2021-08-04 DIAGNOSIS — Z11.52 ENCOUNTER FOR SCREENING FOR COVID-19: ICD-10-CM

## 2021-08-04 PROCEDURE — 86769 SARS-COV-2 COVID-19 ANTIBODY: CPT

## 2021-08-04 PROCEDURE — 36415 COLL VENOUS BLD VENIPUNCTURE: CPT

## 2021-08-05 LAB — SARS-COV-2 IGG+IGM SERPL QL IA: NORMAL

## 2021-09-01 ENCOUNTER — OFFICE VISIT (OUTPATIENT)
Dept: PULMONOLOGY | Facility: CLINIC | Age: 71
End: 2021-09-01
Payer: MEDICARE

## 2021-09-01 VITALS
WEIGHT: 241 LBS | TEMPERATURE: 97.5 F | HEART RATE: 65 BPM | OXYGEN SATURATION: 98 % | DIASTOLIC BLOOD PRESSURE: 80 MMHG | HEIGHT: 69 IN | SYSTOLIC BLOOD PRESSURE: 140 MMHG | BODY MASS INDEX: 35.7 KG/M2

## 2021-09-01 DIAGNOSIS — Z87.891 FORMER SMOKER: ICD-10-CM

## 2021-09-01 DIAGNOSIS — F17.211 CIGARETTE NICOTINE DEPENDENCE IN REMISSION: Primary | ICD-10-CM

## 2021-09-01 DIAGNOSIS — J84.9 ILD (INTERSTITIAL LUNG DISEASE) (HCC): ICD-10-CM

## 2021-09-01 PROBLEM — Z01.818 PREOP EXAMINATION: Status: RESOLVED | Noted: 2019-07-05 | Resolved: 2021-09-01

## 2021-09-01 PROBLEM — J30.9 ALLERGIC RHINITIS: Status: ACTIVE | Noted: 2021-09-01

## 2021-09-01 PROCEDURE — 99213 OFFICE O/P EST LOW 20 MIN: CPT | Performed by: INTERNAL MEDICINE

## 2021-09-01 NOTE — ASSESSMENT & PLAN NOTE
· Patient has an extensive tobacco use history, continues to meet lung cancer screening criteria  · Discussed lung cancer screening rationale with the patient and he is agreeable to proceed with scan which we will schedule for November    This will be 1 year from his prior high-resolution CT scan

## 2021-09-01 NOTE — ASSESSMENT & PLAN NOTE
·  Minor some basilar changes on high-resolution CT scan of the chest last November   · Pulmonary function testing at that time was essentially normal   · No change in symptoms and therefore no need for pulmonary function testing or repeat high-resolution CT imaging    Does meet criteria for continued low-dose lung cancer screening CT scan however

## 2021-09-01 NOTE — PROGRESS NOTES
Progress note - Pulmonary Medicine   Yanick Martinez 80 y o  male MRN: [id number]       Impression & Plan:     ILD (interstitial lung disease) ([facility] [address] )  ·  Minor some basilar changes on high-resolution CT scan of the chest last November   · Pulmonary function testing at that time was essentially normal   · No change in symptoms and therefore no need for pulmonary function testing or repeat high-resolution CT imaging  Does meet criteria for continued low-dose lung cancer screening CT scan however      Former smoker -quit 2014  · Patient has an extensive tobacco use history, continues to meet lung cancer screening criteria  · Discussed lung cancer screening rationale with the patient and he is agreeable to proceed with scan which we will schedule for November  This will be 1 year from his prior high-resolution CT scan     patient has refused to discuss his COVID-19 vaccination status  He does not wish to address this at today's visit    ______________________________________________________________________    HPI:    Yanick Martinez presents today for follow-up of  Probable mild and early interstitial lung disease  He has slight abnormality on high-resolution CT scan but no significant pulmonary function abnormality and no significant chronic pulmonary symptomatology  He does have a cough in the morning which he attributes to postnasal drip  He uses a Neti pot and Benadryl for allergy symptoms  His allergy symptoms typically bother him through from early spring through the end of fall  Over the winter they seem to subside somewhat  His symptoms are controlled with his current regimen however  He reports no new shortness of breath, wheezing, chest tightness, or worsening cough  He has a history of significant tobacco abuse and quit smoking in 2014  He is interested in continued surveillance of his pulmonary condition due to his many years of smoking    He has had lung cancer screening CT scan and last year had high-resolution CT scan imaging which did not show any evidence of pulmonary nodularity  No appetite or weight changes  No fever, chills, or sick contacts  No headache or neurologic symptoms  No abdominal pain  No GERD symptoms  No arthralgias or myalgias  No cardiovascular complaints of leg swelling, chest pain, or palpitations    Current Medications:    Current Outpatient Medications:     Cholecalciferol (VITAMIN D PO), Take by mouth, Disp: , Rfl:     Coenzyme Q10 (CO Q 10 PO), Take 200 mg by mouth, Disp: , Rfl:     diphenhydrAMINE (BENADRYL) 50 mg capsule, Take 50 mg by mouth every 6 (six) hours as needed for itching, Disp: , Rfl:     esomeprazole (NexIUM) 40 MG capsule, Take 1 capsule (40 mg total) by mouth daily, Disp: 90 capsule, Rfl: 1    simvastatin (ZOCOR) 40 mg tablet, Take 1 tablet (40 mg total) by mouth daily at bedtime, Disp: 90 tablet, Rfl: 1    Review of Systems:  Aside from what is mentioned in the HPI, the review of systems is otherwise negative    Past medical history, surgical history, and family history were reviewed and updated as appropriate    Social history updates:  Social History     Tobacco Use   Smoking Status Former Smoker    Packs/day: 1 50    Years: 45 00    Pack years: 67 50    Types: Cigarettes    Start date:     Quit date:     Years since quittin 6   Smokeless Tobacco Never Used       PhysicalExamination:  Vitals:   /80 (BP Location: Left arm, Patient Position: Sitting)   Pulse 65   Temp 97 5 °F (36 4 °C)   Ht 5' 9" (1 753 m)   Wt 109 kg (241 lb)   SpO2 98%   BMI 35 59 kg/m²    Gen: Comfortable  Non-labored  HEENT:  Sclerae are anicteric O/P: clear  moist  Neck: Trachea is midline  No JVD  No adenopathy  Chest:  Metric chest wall excursion with clear breath sounds  Cardiac:  regular  no murmur  Abdomen: Soft and nontender  Bowel sounds are present  Extremities: No edema    Diagnostic Data:  PFT results:   The most recent pulmonary function tests were reviewed  pulmonary function study shows a very mildly reduced FVC  Total lung capacity is normal but he does have some hyperinflation and air trapping  DLCO is moderately reduced at 64% predicted    Imaging:  I personally reviewed the images on the [facility] system pertinent to today's visit   high-resolution CT imaging from November 2020 did show bilateral basilar subpleural reticulation but no chio fibrosis    No pulmonary nodularity or significant parenchymal abnormality otherwise    Vira Rivera MD

## 2021-10-27 ENCOUNTER — CLINICAL SUPPORT (OUTPATIENT)
Dept: FAMILY MEDICINE CLINIC | Facility: CLINIC | Age: 71
End: 2021-10-27

## 2021-10-27 DIAGNOSIS — Z20.822 ENCOUNTER FOR LABORATORY TESTING FOR COVID-19 VIRUS: Primary | ICD-10-CM

## 2021-10-27 DIAGNOSIS — R05.9 COUGH: ICD-10-CM

## 2021-10-27 DIAGNOSIS — Z20.822 EXPOSURE TO COVID-19 VIRUS: ICD-10-CM

## 2021-10-27 PROCEDURE — U0005 INFEC AGEN DETEC AMPLI PROBE: HCPCS | Performed by: FAMILY MEDICINE

## 2021-10-27 PROCEDURE — U0003 INFECTIOUS AGENT DETECTION BY NUCLEIC ACID (DNA OR RNA); SEVERE ACUTE RESPIRATORY SYNDROME CORONAVIRUS 2 (SARS-COV-2) (CORONAVIRUS DISEASE [COVID-19]), AMPLIFIED PROBE TECHNIQUE, MAKING USE OF HIGH THROUGHPUT TECHNOLOGIES AS DESCRIBED BY CMS-2020-01-R: HCPCS | Performed by: FAMILY MEDICINE

## 2021-10-28 LAB — SARS-COV-2 RNA RESP QL NAA+PROBE: POSITIVE

## 2021-10-29 ENCOUNTER — TELEPHONE (OUTPATIENT)
Dept: FAMILY MEDICINE CLINIC | Facility: CLINIC | Age: 71
End: 2021-10-29

## 2021-10-29 ENCOUNTER — TELEMEDICINE (OUTPATIENT)
Dept: FAMILY MEDICINE CLINIC | Facility: CLINIC | Age: 71
End: 2021-10-29
Payer: MEDICARE

## 2021-10-29 ENCOUNTER — HOSPITAL ENCOUNTER (OUTPATIENT)
Dept: INFUSION CENTER | Facility: HOSPITAL | Age: 71
Discharge: HOME | End: 2021-10-29
Payer: MEDICARE

## 2021-10-29 VITALS
OXYGEN SATURATION: 96 % | TEMPERATURE: 98.5 F | DIASTOLIC BLOOD PRESSURE: 71 MMHG | SYSTOLIC BLOOD PRESSURE: 127 MMHG | RESPIRATION RATE: 18 BRPM | HEART RATE: 85 BPM

## 2021-10-29 DIAGNOSIS — U07.1 COVID-19 VIRUS INFECTION: Primary | ICD-10-CM

## 2021-10-29 DIAGNOSIS — J84.9 ILD (INTERSTITIAL LUNG DISEASE) (HCC): ICD-10-CM

## 2021-10-29 DIAGNOSIS — J84.9 ILD (INTERSTITIAL LUNG DISEASE) (HCC): Primary | ICD-10-CM

## 2021-10-29 PROCEDURE — M0243 CASIRIVI AND IMDEVI INFUSION: HCPCS | Performed by: FAMILY MEDICINE

## 2021-10-29 PROCEDURE — 99442 PR PHYS/QHP TELEPHONE EVALUATION 11-20 MIN: CPT | Performed by: FAMILY MEDICINE

## 2021-10-29 RX ORDER — ONDANSETRON 2 MG/ML
4 INJECTION INTRAMUSCULAR; INTRAVENOUS ONCE AS NEEDED
Status: CANCELLED | OUTPATIENT
Start: 2021-10-29

## 2021-10-29 RX ORDER — ONDANSETRON 2 MG/ML
4 INJECTION INTRAMUSCULAR; INTRAVENOUS ONCE AS NEEDED
Status: DISCONTINUED | OUTPATIENT
Start: 2021-10-29 | End: 2021-11-01 | Stop reason: HOSPADM

## 2021-10-29 RX ORDER — SODIUM CHLORIDE 9 MG/ML
20 INJECTION, SOLUTION INTRAVENOUS ONCE
Status: CANCELLED | OUTPATIENT
Start: 2021-10-29

## 2021-10-29 RX ORDER — ALBUTEROL SULFATE 90 UG/1
3 AEROSOL, METERED RESPIRATORY (INHALATION) ONCE AS NEEDED
Status: DISCONTINUED | OUTPATIENT
Start: 2021-10-29 | End: 2021-11-01 | Stop reason: HOSPADM

## 2021-10-29 RX ORDER — ALBUTEROL SULFATE 90 UG/1
3 AEROSOL, METERED RESPIRATORY (INHALATION) ONCE AS NEEDED
Status: CANCELLED | OUTPATIENT
Start: 2021-10-29

## 2021-10-29 RX ORDER — ACETAMINOPHEN 325 MG/1
650 TABLET ORAL ONCE AS NEEDED
Status: CANCELLED | OUTPATIENT
Start: 2021-10-29

## 2021-10-29 RX ORDER — SODIUM CHLORIDE 9 MG/ML
20 INJECTION, SOLUTION INTRAVENOUS ONCE
Status: COMPLETED | OUTPATIENT
Start: 2021-10-29 | End: 2021-10-29

## 2021-10-29 RX ORDER — ACETAMINOPHEN 325 MG/1
650 TABLET ORAL ONCE AS NEEDED
Status: DISCONTINUED | OUTPATIENT
Start: 2021-10-29 | End: 2021-11-01 | Stop reason: HOSPADM

## 2021-10-29 RX ADMIN — IMDEVIMAB 1200 MG COMBINED: 1332 INJECTION, SOLUTION, CONCENTRATE INTRAVENOUS at 14:39

## 2021-10-29 RX ADMIN — SODIUM CHLORIDE 20 ML/HR: 9 INJECTION, SOLUTION INTRAVENOUS at 14:16

## 2021-11-02 ENCOUNTER — TELEPHONE (OUTPATIENT)
Dept: FAMILY MEDICINE CLINIC | Facility: CLINIC | Age: 71
End: 2021-11-02

## 2021-11-03 DIAGNOSIS — E78.5 HYPERLIPIDEMIA, UNSPECIFIED HYPERLIPIDEMIA TYPE: ICD-10-CM

## 2021-11-03 RX ORDER — SIMVASTATIN 40 MG
40 TABLET ORAL
Qty: 90 TABLET | Refills: 1 | Status: SHIPPED | OUTPATIENT
Start: 2021-11-03 | End: 2022-02-09 | Stop reason: SDUPTHER

## 2021-11-05 ENCOUNTER — TELEPHONE (OUTPATIENT)
Dept: FAMILY MEDICINE CLINIC | Facility: CLINIC | Age: 71
End: 2021-11-05

## 2021-11-17 ENCOUNTER — HOSPITAL ENCOUNTER (OUTPATIENT)
Dept: CT IMAGING | Facility: HOSPITAL | Age: 71
Discharge: HOME | End: 2021-11-17
Attending: INTERNAL MEDICINE
Payer: MEDICARE

## 2021-11-17 DIAGNOSIS — F17.211 CIGARETTE NICOTINE DEPENDENCE IN REMISSION: ICD-10-CM

## 2021-11-17 PROCEDURE — 71271 CT THORAX LUNG CANCER SCR C-: CPT

## 2021-11-29 DIAGNOSIS — K21.9 GASTROESOPHAGEAL REFLUX DISEASE WITHOUT ESOPHAGITIS: ICD-10-CM

## 2021-11-29 RX ORDER — ESOMEPRAZOLE MAGNESIUM 40 MG/1
40 CAPSULE, DELAYED RELEASE ORAL DAILY
Qty: 90 CAPSULE | Refills: 1 | Status: SHIPPED | OUTPATIENT
Start: 2021-11-29 | End: 2021-12-06 | Stop reason: SDUPTHER

## 2021-12-06 DIAGNOSIS — K21.9 GASTROESOPHAGEAL REFLUX DISEASE WITHOUT ESOPHAGITIS: ICD-10-CM

## 2021-12-06 RX ORDER — ESOMEPRAZOLE MAGNESIUM 40 MG/1
40 CAPSULE, DELAYED RELEASE ORAL DAILY
Qty: 90 CAPSULE | Refills: 1 | Status: SHIPPED | OUTPATIENT
Start: 2021-12-06 | End: 2022-05-23 | Stop reason: SDUPTHER

## 2022-01-17 ENCOUNTER — OFFICE VISIT (OUTPATIENT)
Dept: FAMILY MEDICINE CLINIC | Facility: CLINIC | Age: 72
End: 2022-01-17
Payer: MEDICARE

## 2022-01-17 VITALS
BODY MASS INDEX: 36.73 KG/M2 | RESPIRATION RATE: 18 BRPM | WEIGHT: 248 LBS | DIASTOLIC BLOOD PRESSURE: 86 MMHG | OXYGEN SATURATION: 99 % | TEMPERATURE: 97.2 F | HEART RATE: 82 BPM | SYSTOLIC BLOOD PRESSURE: 138 MMHG | HEIGHT: 69 IN

## 2022-01-17 DIAGNOSIS — J84.9 ILD (INTERSTITIAL LUNG DISEASE) (HCC): Primary | ICD-10-CM

## 2022-01-17 DIAGNOSIS — R91.8 LUNG NODULES: ICD-10-CM

## 2022-01-17 DIAGNOSIS — I10 PRIMARY HYPERTENSION: ICD-10-CM

## 2022-01-17 DIAGNOSIS — E78.5 HYPERLIPIDEMIA, UNSPECIFIED HYPERLIPIDEMIA TYPE: ICD-10-CM

## 2022-01-17 PROCEDURE — 99214 OFFICE O/P EST MOD 30 MIN: CPT | Performed by: FAMILY MEDICINE

## 2022-01-17 NOTE — PROGRESS NOTES
Assessment/Plan:     Diagnoses and all orders for this visit:    ILD (interstitial lung disease) ([facility] [address] )    Hyperlipidemia, unspecified hyperlipidemia type    Lung nodules    Primary hypertension         had a long discussion about code vaccination  Patient will be getting a vaccine did his upcoming trip to [facility]  Otherwise his CT scan is already rescheduled for June as requested by radiology   His other chronic conditions are stable I did discussed his blood pressure and  Iwould like to put him on a blood pressure medicineToday but he preferred to give it a few months and recheck it in 6 months and try to lose some weight in the interim  He can follow-up in 6 months or sooner if needed       Subjective:     Chief Complaint   Patient presents with    Follow-up     Routine 6 month follow up  Patient ID: Guanaco Gonzalez is a 80 y o  male  Patient is here for six-month check   He has no acute complaints today other than his blood pressure being slightly elevated   We also discussed his weight which is it is increasing      The following portions of the patient's history were reviewed and updated as appropriate: allergies, current medications, past family history, past medical history, past social history, past surgical history and problem list     Review of Systems   Constitutional: Negative  HENT: Negative  Eyes: Negative  Respiratory: Negative  Cardiovascular: Negative  Gastrointestinal: Negative  Endocrine: Negative  Genitourinary: Negative  Musculoskeletal: Negative  Skin: Negative  Allergic/Immunologic: Negative  Neurological: Negative  Hematological: Negative  Psychiatric/Behavioral: Negative  All other systems reviewed and are negative          Objective:    Vitals:    01/17/22 1102   BP: 138/86   BP Location: Left arm   Patient Position: Sitting   Cuff Size: Large   Pulse: 82   Resp: 18   Temp: (!) 97 2 °F (36 2 °C)   TempSrc: Tympanic   SpO2: 99%   Weight: 112 kg (248 lb)   Height: 5' 9" (1 753 m)          Physical Exam  Vitals and nursing note reviewed  Constitutional:       General: He is not in acute distress  Appearance: He is well-developed  HENT:      Head: Normocephalic  Right Ear: External ear normal       Left Ear: External ear normal       Nose: Nose normal    Eyes:      General:         Right eye: No discharge  Left eye: No discharge  Conjunctiva/sclera: Conjunctivae normal       Pupils: Pupils are equal, round, and reactive to light  Cardiovascular:      Rate and Rhythm: Normal rate and regular rhythm  Heart sounds: Normal heart sounds  Pulmonary:      Effort: Pulmonary effort is normal       Breath sounds: Normal breath sounds  Abdominal:      General: Bowel sounds are normal  There is no distension  Palpations: Abdomen is soft  Tenderness: There is no abdominal tenderness  Musculoskeletal:         General: Normal range of motion  Cervical back: Normal range of motion  Skin:     General: Skin is warm and dry  Findings: No rash  Neurological:      Mental Status: He is alert and oriented to person, place, and time  Cranial Nerves: No cranial nerve deficit  Psychiatric:         Behavior: Behavior normal          Thought Content: Thought content normal          Judgment: Judgment normal          BMI Counseling: Body mass index is 36 62 kg/m²  The BMI is above normal  Nutrition recommendations include reducing portion sizes, decreasing overall calorie intake, 3-5 servings of fruits/vegetables daily, reducing fast food intake, consuming healthier snacks, decreasing soda and/or juice intake, moderation in carbohydrate intake, increasing intake of lean protein, reducing intake of saturated fat and trans fat and reducing intake of cholesterol  Exercise recommendations include exercising 3-5 times per week

## 2022-01-17 NOTE — PATIENT INSTRUCTIONS
Low Fat Diet   AMBULATORY CARE:   A low-fat diet  is an eating plan that is low in total fat, unhealthy fat, and cholesterol  You may need to follow a low-fat diet if you have trouble digesting or absorbing fat  You may also need to follow this diet if you have high cholesterol  You can also lower your cholesterol by increasing the amount of fiber in your diet  Soluble fiber is a type of fiber that helps to decrease cholesterol levels  Different types of fat in food:   · Limit unhealthy fats  A diet that is high in cholesterol, saturated fat, and trans fat may cause unhealthy cholesterol levels  Unhealthy cholesterol levels increase your risk of heart disease  ? Cholesterol:  Limit intake of cholesterol to less than 200 mg per day  Cholesterol is found in meat, eggs, and dairy  ? Saturated fat:  Limit saturated fat to less than 7% of your total daily calories  Ask your dietitian how many calories you need each day  Saturated fat is found in butter, cheese, ice cream, whole milk, and palm oil  Saturated fat is also found in meat, such as beef, pork, chicken skin, and processed meats  Processed meats include sausage, hot dogs, and bologna  ? Trans fat:  Avoid trans fat as much as possible  Trans fat is used in fried and baked foods  Foods that say trans fat free on the label may still have up to 0 5 grams of trans fat per serving  · Include healthy fats  Replace foods that are high in saturated and trans fat with foods high in healthy fats  This may help to decrease high cholesterol levels  ? Monounsaturated fats: These are found in avocados, nuts, and vegetable oils, such as olive, canola, and sunflower oil  ? Polyunsaturated fats: These can be found in vegetable oils, such as soybean or corn oil  Omega-3 fats can help to decrease the risk of heart disease  Omega-3 fats are found in fish, such as salmon, herring, trout, and tuna   Omega-3 fats can also be found in plant foods, such as walnuts, flaxseed, soybeans, and canola oil  Foods to limit or avoid:   · Grains:      ? Snacks that are made with partially hydrogenated oils, such as chips, regular crackers, and butter-flavored popcorn    ? High-fat baked goods, such as biscuits, croissants, doughnuts, pies, cookies, and pastries    · Dairy:      ? Whole milk, 2% milk, and yogurt and ice cream made with whole milk    ? Half and half creamer, heavy cream, and whipping cream    ? Cheese, cream cheese, and sour cream    · Meats and proteins:      ? High-fat cuts of meat (T-bone steak, regular hamburger, and ribs)    ? Fried meat, poultry (turkey and chicken), and fish    ? Poultry (chicken and turkey) with skin    ? Cold cuts (salami or bologna), hot dogs, ortega, and sausage    ? Whole eggs and egg yolks    · Vegetables and fruits with added fat:      ? Fried vegetables or vegetables in butter or high-fat sauces, such as cream or cheese sauces    ? Fried fruit or fruit served with butter or cream    · Fats:      ? Butter, stick margarine, and shortening    ? Coconut, palm oil, and palm kernel oil    Foods to include:   · Grains:      ? Whole-grain breads, cereals, pasta, and brown rice    ? Low-fat crackers and pretzels    · Vegetables and fruits:      ? Fresh, frozen, or canned vegetables (no salt or low-sodium)    ? Fresh, frozen, dried, or canned fruit (canned in light syrup or fruit juice)    ? Avocado    · Low-fat dairy products:      ? Nonfat (skim) or 1% milk    ? Nonfat or low-fat cheese, yogurt, and cottage cheese    · Meats and proteins:      ? Chicken or turkey with no skin    ? Baked or broiled fish    ? Lean beef and pork (loin, round, extra lean hamburger)    ? Beans and peas, unsalted nuts, soy products    ? Egg whites and substitutes    ? Seeds and nuts    · Fats:      ? Unsaturated oil, such as canola, olive, peanut, soybean, or sunflower oil    ? Soft or liquid margarine and vegetable oil spread    ?  Low-fat salad dressing    Other ways to decrease fat:   · Read food labels before you buy foods  Choose foods that have less than 30% of calories from fat  Choose low-fat or fat-free dairy products  Remember that fat free does not mean calorie free  These foods still contain calories, and too many calories can lead to weight gain  · Trim fat from meat and avoid fried food  Trim all visible fat from meat before you cook it  Remove the skin from poultry  Do not nesbitt meat, fish, or poultry  Bake, roast, boil, or broil these foods instead  Avoid fried foods  Eat a baked potato instead of [place] Ruth fries  Steam vegetables instead of sautéing them in butter  · Add less fat to foods  Use imitation ortega bits on salads and baked potatoes instead of regular ortega bits  Use fat-free or low-fat salad dressings instead of regular dressings  Use low-fat or nonfat butter-flavored topping instead of regular butter or margarine on popcorn and other foods  Ways to decrease fat in recipes:  Replace high-fat ingredients with low-fat or nonfat ones  This may cause baked goods to be drier than usual  You may need to use nonfat cooking spray on pans to prevent food from sticking  You also may need to change the amount of other ingredients, such as water, in the recipe  Try the following:  · Use low-fat or light margarine instead of regular margarine or shortening  · Use lean ground turkey breast or chicken, or lean ground beef (less than 5% fat) instead of hamburger  · Add 1 teaspoon of canola oil to 8 ounces of skim milk instead of using cream or half and half  · Use grated zucchini, carrots, or apples in breads instead of coconut  · Use blenderized, low-fat cottage cheese, plain tofu, or low-fat ricotta cheese instead of cream cheese  · Use 1 egg white and 1 teaspoon of canola oil, or use ¼ cup (2 ounces) of fat-free egg substitute instead of a whole egg       · Replace half of the oil that is called for in a recipe with applesauce when you bake  Use 3 tablespoons of cocoa powder and 1 tablespoon of canola oil instead of a square of baking chocolate  How to increase fiber:  Eat enough high-fiber foods to get 20 to 30 grams of fiber every day  Slowly increase your fiber intake to avoid stomach cramps, gas, and other problems  · Eat 3 ounces of whole-grain foods each day  An ounce is about 1 slice of bread  Eat whole-grain breads, such as whole-wheat bread  Whole wheat, whole-wheat flour, or other whole grains should be listed as the first ingredient on the food label  Replace white flour with whole-grain flour or use half of each in recipes  Whole-grain flour is heavier than white flour, so you may have to add more yeast or baking powder  · Eat a high-fiber cereal for breakfast   Oatmeal is a good source of soluble fiber  Look for cereals that have bran or fiber in the name  Choose whole-grain products, such as brown rice, barley, and whole-wheat pasta  · Eat more beans, peas, and lentils  For example, add beans to soups or salads  Eat at least 5 cups of fruits and vegetables each day  Eat fruits and vegetables with the peel because the peel is high in fiber  © Copyright [employer] 2021 Information is for End User's use only and may not be sold, redistributed or otherwise used for commercial purposes  All illustrations and images included in CareNotes® are the copyrighted property of A D A M , Inc  or [address] [place], [place] [address]   The above information is an  only  It is not intended as medical advice for individual conditions or treatments  Talk to your doctor, nurse or pharmacist before following any medical regimen to see if it is safe and effective for you  Heart Healthy Diet   AMBULATORY CARE:   A heart healthy diet  is an eating plan low in unhealthy fats and sodium (salt)  The plan is high in healthy fats and fiber   A heart healthy diet helps improve your cholesterol levels and lowers your risk for heart disease and stroke  A dietitian will teach you how to read and understand food labels  Heart healthy diet guidelines to follow:   · Choose foods that contain healthy fats  ? Unsaturated fats  include monounsaturated and polyunsaturated fats  Unsaturated fat is found in foods such as soybean, canola, olive, corn, and safflower oils  It is also found in soft tub margarine that is made with liquid vegetable oil  ? Omega-3 fat  is found in certain fish, such as salmon, tuna, and trout, and in walnuts and flaxseed  Eat fish high in omega-3 fats at least 2 times a week  · Get 20 to 30 grams of fiber each day  Fruits, vegetables, whole-grain foods, and legumes (cooked beans) are good sources of fiber  · Limit or do not have unhealthy fats  ? Cholesterol  is found in animal foods, such as eggs and lobster, and in dairy products made from whole milk  Limit cholesterol to less than 200 mg each day  ? Saturated fat  is found in meats, such as ortega and hamburger  It is also found in chicken or turkey skin, whole milk, and butter  Limit saturated fat to less than 7% of your total daily calories  ? Trans fat  is found in packaged foods, such as potato chips and cookies  It is also in hard margarine, some fried foods, and shortening  Do not eat foods that contain trans fats  · Limit sodium as directed  You may be told to limit sodium to 2,000 to 2,300 mg each day  Choose low-sodium or no-salt-added foods  Add little or no salt to food you prepare  Use herbs and spices in place of salt  Include the following in your heart healthy plan:  Ask your dietitian or healthcare provider how many servings to have from each of the following food groups:  · Grains:      ? Whole-wheat breads, cereals, and pastas, and brown rice    ? Low-fat, low-sodium crackers and chips    · Vegetables:      ? Broccoli, green beans, green peas, and spinach    ? Collards, kale, and lima beans    ?  Carrots, sweet potatoes, tomatoes, and peppers    ? Canned vegetables with no salt added    · Fruits:      ? Bananas, peaches, pears, and pineapple    ? Grapes, raisins, and dates    ? Oranges, tangerines, grapefruit, orange juice, and grapefruit juice    ? Apricots, mangoes, melons, and papaya    ? Raspberries and strawberries    ? Canned fruit with no added sugar    · Low-fat dairy:      ? Nonfat (skim) milk, 1% milk, and low-fat almond, cashew, or soy milks fortified with calcium    ? Low-fat cheese, regular or frozen yogurt, and cottage cheese    · Meats and proteins:      ? Lean cuts of beef and pork (loin, leg, round), skinless chicken and turkey    ? Legumes, soy products, egg whites, or nuts    Limit or do not include the following in your heart healthy plan:   · Unhealthy fats and oils:      ? Whole or 2% milk, cream cheese, sour cream, or cheese    ? High-fat cuts of beef (T-bone steaks, ribs), chicken or turkey with skin, and organ meats such as liver    ? Butter, stick margarine, shortening, and cooking oils such as coconut or palm oil    · Foods and liquids high in sodium:      ? Packaged foods, such as frozen dinners, cookies, macaroni and cheese, and cereals with more than 300 mg of sodium per serving    ? Vegetables with added sodium, such as instant potatoes, vegetables with added sauces, or regular canned vegetables    ? Cured or smoked meats, such as hot dogs, ortega, and sausage    ? High-sodium ketchup, barbecue sauce, salad dressing, pickles, olives, soy sauce, or miso    · Foods and liquids high in sugar:      ? Candy, cake, cookies, pies, or doughnuts    ? Soft drinks (soda), sports drinks, or sweetened tea    ? Canned or dry mixes for cakes, soups, sauces, or gravies    Other healthy heart guidelines:   · Do not smoke  Nicotine and other chemicals in cigarettes and cigars can cause lung and heart damage  Ask your healthcare provider for information if you currently smoke and need help to quit  E-cigarettes or smokeless tobacco still contain nicotine  Talk to your healthcare provider before you use these products  · Limit or do not drink alcohol as directed  Alcohol can damage your heart and raise your blood pressure  Your healthcare provider may give you specific daily and weekly limits  The general recommended limit is 1 drink a day for women 21 or older and for men 72 or older  Do not have more than 3 drinks in a day or 7 in a week  The recommended limit is 2 drinks a day for men 24to 59years of age  Do not have more than 4 drinks in a day or 14 in a week  A drink of alcohol is 12 ounces of beer, 5 ounces of wine, or 1½ ounces of liquor  · Exercise regularly  Exercise can help you maintain a healthy weight and improve your blood pressure and cholesterol levels  Regular exercise can also decrease your risk for heart problems  Ask your healthcare provider about the best exercise plan for you  Do not start an exercise program without asking your healthcare provider  Follow up with your doctor or cardiologist as directed:  Write down your questions so you remember to ask them during your visits  © Copyright [employer] 2021 Information is for End User's use only and may not be sold, redistributed or otherwise used for commercial purposes  All illustrations and images included in CareNotes® are the copyrighted property of A [employer] A M , Inc  or [facility] Radha Mendez   The above information is an  only  It is not intended as medical advice for individual conditions or treatments  Talk to your doctor, nurse or pharmacist before following any medical regimen to see if it is safe and effective for you

## 2022-02-09 DIAGNOSIS — E78.5 HYPERLIPIDEMIA, UNSPECIFIED HYPERLIPIDEMIA TYPE: ICD-10-CM

## 2022-02-09 RX ORDER — SIMVASTATIN 40 MG
40 TABLET ORAL
Qty: 90 TABLET | Refills: 1 | Status: SHIPPED | OUTPATIENT
Start: 2022-02-09 | End: 2022-02-11 | Stop reason: SDUPTHER

## 2022-02-11 DIAGNOSIS — E78.5 HYPERLIPIDEMIA, UNSPECIFIED HYPERLIPIDEMIA TYPE: ICD-10-CM

## 2022-02-11 RX ORDER — SIMVASTATIN 40 MG
40 TABLET ORAL
Qty: 90 TABLET | Refills: 1 | Status: SHIPPED | OUTPATIENT
Start: 2022-02-11 | End: 2022-05-10 | Stop reason: SDUPTHER

## 2022-02-22 ENCOUNTER — TELEMEDICINE (OUTPATIENT)
Dept: FAMILY MEDICINE CLINIC | Facility: CLINIC | Age: 72
End: 2022-02-22
Payer: MEDICARE

## 2022-02-22 VITALS — BODY MASS INDEX: 36.73 KG/M2 | HEIGHT: 69 IN | TEMPERATURE: 102 F | WEIGHT: 248 LBS

## 2022-02-22 DIAGNOSIS — R50.9 FEVER, UNSPECIFIED FEVER CAUSE: Primary | ICD-10-CM

## 2022-02-22 DIAGNOSIS — R05.9 COUGH: ICD-10-CM

## 2022-02-22 PROCEDURE — 99213 OFFICE O/P EST LOW 20 MIN: CPT | Performed by: FAMILY MEDICINE

## 2022-02-22 RX ORDER — PREDNISONE 10 MG/1
TABLET ORAL
Qty: 21 TABLET | Refills: 0 | Status: SHIPPED | OUTPATIENT
Start: 2022-02-22

## 2022-02-22 RX ORDER — DOXYCYCLINE 100 MG/1
100 CAPSULE ORAL 2 TIMES DAILY
Qty: 14 CAPSULE | Refills: 0 | Status: SHIPPED | OUTPATIENT
Start: 2022-02-22 | End: 2022-03-01

## 2022-02-22 NOTE — PROGRESS NOTES
Virtual Regular Visit    Verification of patient location:    Patient is located in the following state in which I hold an active license [place]      Assessment/Plan:    Problem List Items Addressed This Visit     None      Visit Diagnoses     Fever, unspecified fever cause    -  Primary    Relevant Medications    predniSONE 10 mg tablet    doxycycline monohydrate (MONODOX) 100 mg capsule    Cough        Relevant Medications    predniSONE 10 mg tablet    doxycycline monohydrate (MONODOX) 100 mg capsule         meds as above  COVID test ordered and is scheduled for tomorrow    Depression Screening and Follow-up Plan: Patient was screened for depression during today's encounter  They screened negative with a PHQ-2 score of 0  Reason for visit is   Chief Complaint   Patient presents with    URI     symptoms x one week    Fever     102 this morning    Cough     non productive cough    Virtual Regular Visit        Encounter provider Serjio Baird DO    Provider located at [address]  [address] [place] [id number] [phone]      Recent Visits  No visits were found meeting these conditions  Showing recent visits within past 7 days and meeting all other requirements  Today's Visits  Date Type Provider Dept   02/22/22 Telemedicine DO Ronnell Mejia   Showing today's visits and meeting all other requirements  Future Appointments  No visits were found meeting these conditions  Showing future appointments within next 150 days and meeting all other requirements       The patient was identified by name and date of birth  Gene Ahumada was informed that this is a telemedicine visit and that the visit is being conducted through [address] [place] [address] Now and patient was informed that this is a secure, HIPAA-compliant platform  He agrees to proceed     My office door was closed  No one else was in the room    He acknowledged consent and understanding of privacy and security of the video platform  The patient has agreed to participate and understands they can discontinue the visit at any time  Patient is aware this is a billable service  Subjective  Erin Colin is a 80 y o  male  Presents today for cough and fever   Patient presents today with fever cough chills body aches   Patient did have COVID a few months ago  His symptoms feel similar   Symptoms started few days ago    URI   Associated symptoms include coughing  Fever  Associated symptoms include coughing     Cough         Past Medical History:   Diagnosis Date    AC joint arthropathy     right    BPH (benign prostatic hyperplasia)     Carpal tunnel syndrome     bilateral    Chronic prostatitis     last assessed: 3/1/2014    Chronic UTI (urinary tract infection)     Colon polyp     Diverticulitis     twice    Ganglion     last assessed: 10/7/2013    GERD (gastroesophageal reflux disease)     GERD without esophagitis     Hyperlipidemia     Impingement syndrome of right shoulder     last assessed: 3/22/2016    Left inguinal hernia     description: repaired by Dr Mago Lira Peyronie's disease     had surgery    Pneumonia of left upper lobe due to infectious organism     last assessed: 2/8/2016    Sexual dysfunction     Stomach disorder     Wears glasses        Past Surgical History:   Procedure Laterality Date    COLONOSCOPY      COLONOSCOPY      CYSTOSCOPY      Diagnostic last assessed: 6/12/2014    CYSTOSTOMY      urethral stricture    HERNIA REPAIR      mesh left inguinal    NEUROPLASTY / TRANSPOSITION MEDIAN NERVE AT CARPAL TUNNEL      CT ANKLE SCOPE,PLANTAR FASCIOTOMY Left 11/18/2016    Procedure: PLANTAR FASCIOTOMY;  Surgeon: Efren Verde DPM;  [facility];  Service: Podiatry    CT CYSTOURETHRO W/IMPLANT N/A 6/23/2020    Procedure: Luvenia Shearing DILATION,  WITH INSERTION [facility];  Surgeon: Arcelia Marie MD;  [facility];  Service: Urology    CT Bassam Ram PRIMARY GANGLION WRIST Left 1/24/2018    Procedure: WRIST RESECTION VOLAR GANGLION CYST;  Surgeon: Kennedy Washington MD;  [facility];  Service: Orthopedics    WV REVISE MEDIAN N/CARPAL TUNNEL SURG Right 8/24/2016    Procedure: CARPAL TUNNEL RELEASE ;  Surgeon: Kennedy Washington MD;  [facility];  Service: Orthopedics    W[place] REVISE MEDIAN N/CARPAL TUNNEL SURG Left 1/24/2018    Procedure: RELEASE CARPAL TUNNEL;  Surgeon: Kennedy Washington MD;  [facility];  Service: Orthopedics       Current Outpatient Medications   Medication Sig Dispense Refill    Cholecalciferol (VITAMIN D PO) Take by mouth      Coenzyme Q10 (CO Q 10 PO) Take 200 mg by mouth      diphenhydrAMINE (BENADRYL) 50 mg capsule Take 50 mg by mouth every 6 (six) hours as needed for itching      esomeprazole (NexIUM) 40 MG capsule Take 1 capsule (40 mg total) by mouth daily 90 capsule 1    simvastatin (ZOCOR) 40 mg tablet Take 1 tablet (40 mg total) by mouth daily at bedtime 90 tablet 1    doxycycline monohydrate (MONODOX) 100 mg capsule Take 1 capsule (100 mg total) by mouth 2 (two) times a day for 7 days 14 capsule 0    predniSONE 10 mg tablet 6 tabs on Day 1,5 tabs on Day 2,4 tabs on Day 3,3 tabs on Day 4,2 tabs on  Day 5And 1 tab on Day 6 21 tablet 0     No current facility-administered medications for this visit  Allergies   Allergen Reactions    Pollen Extract Allergic Rhinitis       Review of Systems   Constitutional: Negative  HENT: Negative  Eyes: Negative  Respiratory: Positive for cough  Cardiovascular: Negative  Gastrointestinal: Negative  Endocrine: Negative  Genitourinary: Negative  Musculoskeletal: Negative  Skin: Negative  Allergic/Immunologic: Negative  Neurological: Negative for seizures  Hematological: Negative  Psychiatric/Behavioral: Negative  All other systems reviewed and are negative        Video Exam    Vitals:    02/22/22 1602   Temp: (!) 102 °F (38 9 °C) TempSrc: Tympanic   Weight: 112 kg (248 lb)   Height: 5' 9" (1 753 m)       Physical Exam  Vitals and nursing note reviewed  Constitutional:       General: He is not in acute distress  Appearance: He is well-developed  HENT:      Head: Normocephalic  Right Ear: External ear normal       Left Ear: External ear normal       Nose: Nose normal    Eyes:      General:         Right eye: No discharge  Left eye: No discharge  Conjunctiva/sclera: Conjunctivae normal    Pulmonary:      Effort: Pulmonary effort is normal    Musculoskeletal:         General: Normal range of motion  Cervical back: Normal range of motion  Skin:     General: Skin is warm and dry  Findings: No rash  Neurological:      Mental Status: He is alert and oriented to person, place, and time  Cranial Nerves: No cranial nerve deficit  Psychiatric:         Behavior: Behavior normal          Thought Content: Thought content normal          Judgment: Judgment normal           I spent  15 minutes directly with the patient during this visit    VIRTUAL VISIT DISCLAIMER      Eleonora Richmond verbally agrees to participate in [employer]  Pt is aware that [employer] could be limited without vital signs or the ability to perform a full hands-on physical exam  Heath Dumas understands he or the provider may request at any time to terminate the video visit and request the patient to seek care or treatment in person

## 2022-02-23 ENCOUNTER — CLINICAL SUPPORT (OUTPATIENT)
Dept: FAMILY MEDICINE CLINIC | Facility: CLINIC | Age: 72
End: 2022-02-23

## 2022-02-23 DIAGNOSIS — R05.9 COUGH: Primary | ICD-10-CM

## 2022-02-23 DIAGNOSIS — R50.9 FEVER, UNSPECIFIED FEVER CAUSE: ICD-10-CM

## 2022-02-23 PROCEDURE — 87636 SARSCOV2 & INF A&B AMP PRB: CPT | Performed by: FAMILY MEDICINE

## 2022-02-24 LAB
FLUAV RNA RESP QL NAA+PROBE: NEGATIVE
FLUBV RNA RESP QL NAA+PROBE: NEGATIVE
SARS-COV-2 RNA RESP QL NAA+PROBE: NEGATIVE

## 2022-05-10 DIAGNOSIS — E78.5 HYPERLIPIDEMIA, UNSPECIFIED HYPERLIPIDEMIA TYPE: ICD-10-CM

## 2022-05-10 RX ORDER — SIMVASTATIN 40 MG
40 TABLET ORAL
Qty: 90 TABLET | Refills: 1 | Status: SHIPPED | OUTPATIENT
Start: 2022-05-10 | End: 2022-05-11 | Stop reason: SDUPTHER

## 2022-05-11 ENCOUNTER — TELEPHONE (OUTPATIENT)
Dept: FAMILY MEDICINE CLINIC | Facility: CLINIC | Age: 72
End: 2022-05-11

## 2022-05-11 DIAGNOSIS — E78.5 HYPERLIPIDEMIA, UNSPECIFIED HYPERLIPIDEMIA TYPE: ICD-10-CM

## 2022-05-11 RX ORDER — SIMVASTATIN 40 MG
40 TABLET ORAL
Qty: 90 TABLET | Refills: 1 | Status: SHIPPED | OUTPATIENT
Start: 2022-05-11 | End: 2022-05-12 | Stop reason: SDUPTHER

## 2022-05-11 NOTE — TELEPHONE ENCOUNTER
Patient called he asked for a refill yesterday and it was sent to wrong pharmacy     Can you resend his simvastatin to the [facility] in [place]

## 2022-05-12 DIAGNOSIS — E78.5 HYPERLIPIDEMIA, UNSPECIFIED HYPERLIPIDEMIA TYPE: ICD-10-CM

## 2022-05-12 RX ORDER — SIMVASTATIN 40 MG
40 TABLET ORAL
Qty: 90 TABLET | Refills: 1 | Status: SHIPPED | OUTPATIENT
Start: 2022-05-12 | End: 2022-07-18 | Stop reason: SDUPTHER

## 2022-05-12 NOTE — TELEPHONE ENCOUNTER
Patient states [facility] will not fill this because there is a pending refill at [facility]     Can we cancel the one at [facility] and fill this at [facility]? Patient is out of meds

## 2022-05-23 DIAGNOSIS — K21.9 GASTROESOPHAGEAL REFLUX DISEASE WITHOUT ESOPHAGITIS: ICD-10-CM

## 2022-05-23 RX ORDER — ESOMEPRAZOLE MAGNESIUM 40 MG/1
40 CAPSULE, DELAYED RELEASE ORAL DAILY
Qty: 90 CAPSULE | Refills: 1 | Status: SHIPPED | OUTPATIENT
Start: 2022-05-23

## 2022-06-01 ENCOUNTER — HOSPITAL ENCOUNTER (OUTPATIENT)
Dept: CT IMAGING | Facility: HOSPITAL | Age: 72
Discharge: HOME | End: 2022-06-01
Payer: MEDICARE

## 2022-06-01 ENCOUNTER — APPOINTMENT (OUTPATIENT)
Dept: LAB | Facility: CLINIC | Age: 72
End: 2022-06-01
Payer: MEDICARE

## 2022-06-01 DIAGNOSIS — R91.1 LUNG NODULE: ICD-10-CM

## 2022-06-01 DIAGNOSIS — Z12.5 SCREENING FOR PROSTATE CANCER: ICD-10-CM

## 2022-06-01 LAB — PSA SERPL-MCNC: 0.6 NG/ML (ref 0–4)

## 2022-06-01 PROCEDURE — 71250 CT THORAX DX C-: CPT

## 2022-06-01 PROCEDURE — G1004 CDSM NDSC: HCPCS

## 2022-06-01 PROCEDURE — G0103 PSA SCREENING: HCPCS

## 2022-06-01 PROCEDURE — 36415 COLL VENOUS BLD VENIPUNCTURE: CPT

## 2022-07-18 ENCOUNTER — OFFICE VISIT (OUTPATIENT)
Dept: FAMILY MEDICINE CLINIC | Facility: CLINIC | Age: 72
End: 2022-07-18
Payer: MEDICARE

## 2022-07-18 VITALS
DIASTOLIC BLOOD PRESSURE: 84 MMHG | HEART RATE: 60 BPM | OXYGEN SATURATION: 98 % | RESPIRATION RATE: 18 BRPM | TEMPERATURE: 97.4 F | BODY MASS INDEX: 35.04 KG/M2 | SYSTOLIC BLOOD PRESSURE: 120 MMHG | WEIGHT: 236.6 LBS | HEIGHT: 69 IN

## 2022-07-18 DIAGNOSIS — Z79.899 HIGH RISK MEDICATION USE: ICD-10-CM

## 2022-07-18 DIAGNOSIS — Z00.00 MEDICARE ANNUAL WELLNESS VISIT, SUBSEQUENT: Primary | ICD-10-CM

## 2022-07-18 DIAGNOSIS — E66.01 OBESITY, MORBID (HCC): ICD-10-CM

## 2022-07-18 DIAGNOSIS — Z13.6 SCREENING FOR CARDIOVASCULAR CONDITION: ICD-10-CM

## 2022-07-18 DIAGNOSIS — E78.5 HYPERLIPIDEMIA, UNSPECIFIED HYPERLIPIDEMIA TYPE: ICD-10-CM

## 2022-07-18 PROCEDURE — G0439 PPPS, SUBSEQ VISIT: HCPCS | Performed by: FAMILY MEDICINE

## 2022-07-18 RX ORDER — SIMVASTATIN 40 MG
40 TABLET ORAL
Qty: 30 TABLET | Refills: 0 | Status: SHIPPED | OUTPATIENT
Start: 2022-07-18

## 2022-07-18 NOTE — PROGRESS NOTES
Assessment and Plan:     Problem List Items Addressed This Visit        Other    Hyperlipidemia    Relevant Medications    simvastatin (ZOCOR) 40 mg tablet    Other Relevant Orders    Lipid panel    Obesity, morbid ([address] )      Other Visit Diagnoses     Medicare annual wellness visit, subsequent    -  Primary    Screening for cardiovascular condition        Relevant Orders    CBC and differential    Comprehensive metabolic panel    Lipid panel    UA (URINE) with reflex to Scope    High risk medication use        Relevant Orders    CBC and differential    Comprehensive metabolic panel    Lipid panel    UA (URINE) with reflex to Scope        Medicare wellness visit completed  Labs ordered   Script given for 1 month simvastatin due to his trip being out during his trip so he can take this with him  Otherwise he will follow-up with dermatology   he can  follow-up in 6 months or sooner if needed        Depression Screening and Follow-up Plan: Patient was screened for depression during today's encounter  They screened negative with a PHQ-2 score of 0  Preventive health issues were discussed with patient, and age appropriate screening tests were ordered as noted in patient's After Visit Summary  Personalized health advice and appropriate referrals for health education or preventive services given if needed, as noted in patient's After Visit Summary  History of Present Illness:     Patient presents for a Medicare Wellness Visit    Patient presents today for Medicare wellness visit   He has no acute complaints today  He is leaving for [employer] in 2 weeks   He recently got his COVID shots     Patient Care Team:  Delon Lagos DO as PCP - General (Family Medicine)  MD Maria Dolores Johnson MD Carmela Hammond, MD     Review of Systems:     Review of Systems   Constitutional: Negative  HENT: Negative  Eyes: Negative  Respiratory: Negative  Cardiovascular: Negative  Gastrointestinal: Negative  Endocrine: Negative  Genitourinary: Negative  Musculoskeletal: Negative  Skin: Negative  Allergic/Immunologic: Negative  Neurological: Negative  Hematological: Negative  Psychiatric/Behavioral: Negative  All other systems reviewed and are negative         Problem List:     Patient Active Problem List   Diagnosis    Carpal tunnel syndrome of left wrist    Tendinitis of left rotator cuff    Biceps tendonitis on left    Benign colon polyp    Hyperlipidemia    Peyronie's disease    GERD without esophagitis    Former smoker -quit 2014    UTI (urinary tract infection)    Frequent urination    Postprocedural anterior urethral stricture    Other urethral stricture, male, unspecified site    ILD (interstitial lung disease) [facility]    Obesity, morbid ([facility] [address] )    Allergic rhinitis      Past Medical and Surgical History:     Past Medical History:   Diagnosis Date    AC joint arthropathy     right    BPH (benign prostatic hyperplasia)     Carpal tunnel syndrome     bilateral    Chronic prostatitis     last assessed: 3/1/2014    Chronic UTI (urinary tract infection)     Colon polyp     Diverticulitis     twice    Ganglion     last assessed: 10/7/2013    GERD (gastroesophageal reflux disease)     GERD without esophagitis     Hyperlipidemia     Impingement syndrome of right shoulder     last assessed: 3/22/2016    Left inguinal hernia     description: repaired by Dr Manuelito Quintero Peyronie's disease     had surgery    Pneumonia of left upper lobe due to infectious organism     last assessed: 2/8/2016    Sexual dysfunction     Stomach disorder     Wears glasses      Past Surgical History:   Procedure Laterality Date    COLONOSCOPY      COLONOSCOPY      CYSTOSCOPY      Diagnostic last assessed: 6/12/2014    CYSTOSTOMY      urethral stricture    HERNIA REPAIR      mesh left inguinal    NEUROPLASTY / TRANSPOSITION MEDIAN NERVE AT CARPAL TUNNEL      CT ANKLE SCOPE,PLANTAR FASCIOTOMY Left 2016    Procedure: PLANTAR FASCIOTOMY;  Surgeon: Ember Yarbrough DPM;  [facility];  Service: Podiatry    FL CYSTOURETHRO W/IMPLANT N/A 2020    Procedure: Jose Waldrop,  WITH INSERTION Gale Rico;  Surgeon: Julee Peabody, MD;  [facility];  Service: Urology    FL EXCIS PRIMARY GANGLION WRIST Left 2018    Procedure: WRIST RESECTION VOLAR GANGLION CYST;  Surgeon: Domitila Ramirez MD;  [facility];  Service: Orthopedics    FL REVISE MEDIAN N/CARPAL TUNNEL SURG Right 2016    Procedure: CARPAL TUNNEL RELEASE ;  Surgeon: Domitila Ramirez MD;  [facility];  Service: Orthopedics    FL REVISE MEDIAN N/CARPAL TUNNEL SURG Left 2018    Procedure: RELEASE CARPAL TUNNEL;  Surgeon: Domitila Ramirez MD;  [facility];  Service: Orthopedics      Family History:     Family History   Problem Relation Age of Onset    Cancer Father         bladder    Colon polyps Neg Hx     Colon cancer Neg Hx       Social History:     Social History     Socioeconomic History    Marital status:      Spouse name: None    Number of children: 1    Years of education: None    Highest education level: None   Occupational History    Occupation: Working full time    Tobacco Use    Smoking status: Former Smoker     Packs/day: 1 50     Years: 45 00     Pack years: 67 50     Types: Cigarettes     Start date:      Quit date:      Years since quittin 5    Smokeless tobacco: Never Used   Vaping Use    Vaping Use: Never used   Substance and Sexual Activity    Alcohol use: Yes     Comment: social and denies alcohol use causing problems     Drug use: No    Sexual activity: Not Currently   Other Topics Concern    None   Social History Narrative    None     Social Determinants of Health     Financial Resource Strain: Not on file   Food Insecurity: Not on file   Transportation Needs: Not on file   Physical Activity: Not on file   Stress: Not on file Social Connections: Not on file   Intimate Partner Violence: Not At Risk    Fear of Current or Ex-Partner: No    Emotionally Abused: No    Physically Abused: No    Sexually Abused: No   Housing Stability: Not on file      Medications and Allergies:     Current Outpatient Medications   Medication Sig Dispense Refill    Cholecalciferol (VITAMIN D PO) Take by mouth      Coenzyme Q10 (CO Q 10 PO) Take 200 mg by mouth      diphenhydrAMINE (BENADRYL) 50 mg capsule Take 50 mg by mouth every 6 (six) hours as needed for itching      esomeprazole (NexIUM) 40 MG capsule Take 1 capsule (40 mg total) by mouth in the morning  90 capsule 1    simvastatin (ZOCOR) 40 mg tablet Take 1 tablet (40 mg total) by mouth daily at bedtime 30 tablet 0    predniSONE 10 mg tablet 6 tabs on Day 1,5 tabs on Day 2,4 tabs on Day 3,3 tabs on Day 4,2 tabs on  Day 5And 1 tab on Day 6 21 tablet 0     No current facility-administered medications for this visit  Allergies   Allergen Reactions    Pollen Extract Allergic Rhinitis      Immunizations:     Immunization History   Administered Date(s) Administered    Pneumococcal Conjugate 13-Valent 09/20/2018    Pneumococcal Polysaccharide PPV23 04/06/2016    Tdap 04/21/2012    Zoster 09/08/2014      Health Maintenance:         Topic Date Due    Lung Cancer Screening  06/01/2023    Colorectal Cancer Screening  01/27/2031    Hepatitis C Screening  Completed         Topic Date Due    COVID-19 Vaccine (1) Never done    Influenza Vaccine (1) 09/01/2022      Medicare Screening Tests and Risk Assessments:     Manohar Hartmann is here for his Subsequent Wellness visit  Last Medicare Wellness visit information reviewed, patient interviewed and updates made to the record today  Health Risk Assessment:   Patient rates overall health as good  Patient feels that their physical health rating is same  Patient is satisfied with their life  Eyesight was rated as same  Hearing was rated as same   Patient feels that their emotional and mental health rating is same  Patients states they are never, rarely angry  Patient states they are never, rarely unusually tired/fatigued  Pain experienced in the last 7 days has been none  Patient states that he has experienced no weight loss or gain in last 6 months  Depression Screening:   PHQ-2 Score: 0      Fall Risk Screening: In the past year, patient has experienced: no history of falling in past year      Home Safety:  Patient does not have trouble with stairs inside or outside of their home  Patient has working smoke alarms and has working carbon monoxide detector  Home safety hazards include: none  Nutrition:   Current diet is Regular  Medications:   Patient is currently taking over-the-counter supplements  OTC medications include: see medication list  Patient is able to manage medications  Activities of Daily Living (ADLs)/Instrumental Activities of Daily Living (IADLs):   Walk and transfer into and out of bed and chair?: Yes  Dress and groom yourself?: Yes    Bathe or shower yourself?: Yes    Feed yourself? Yes  Do your laundry/housekeeping?: Yes  Manage your money, pay your bills and track your expenses?: Yes  Make your own meals?: Yes    Do your own shopping?: Yes    Previous Hospitalizations:   Any hospitalizations or ED visits within the last 12 months?: No      Advance Care Planning:   Living will: Yes    Durable POA for healthcare:  Yes    Advanced directive: Yes    Advanced directive counseling given: Yes    Five wishes given: No    End of Life Decisions reviewed with patient: Yes    Provider agrees with end of life decisions: Yes      Cognitive Screening:   Provider or family/friend/caregiver concerned regarding cognition?: No    PREVENTIVE SCREENINGS      Cardiovascular Screening:    General: History Lipid Disorder and Screening Current      Diabetes Screening:     General: Screening Current      Colorectal Cancer Screening:     General: Screening Current      Prostate Cancer Screening:    General: Screening Current      Osteoporosis Screening:    General: Screening Not Indicated      Abdominal Aortic Aneurysm (AAA) Screening:    Risk factors include: age between 73-67 yo and tobacco use        General: Screening Not Indicated      Lung Cancer Screening:     General: Screening Current      Hepatitis C Screening:    General: Screening Current    Screening, Brief Intervention, and Referral to Treatment (SBIRT)    Screening  Typical number of drinks in a day: 3  Typical number of drinks in a week: 15  Interpretation: Low risk drinking behavior  Single Item Drug Screening:  How often have you used an illegal drug (including marijuana) or a prescription medication for non-medical reasons in the past year? never    Single Item Drug Screen Score: 0  Interpretation: Negative screen for possible drug use disorder    Brief Intervention  Alcohol & drug use screenings were reviewed  No concerns regarding substance use disorder identified  Other Counseling Topics:   Car/seat belt/driving safety, skin self-exam, sunscreen and regular weightbearing exercise and calcium and vitamin D intake  Visual Acuity Screening    Right eye Left eye Both eyes   Without correction:      With correction:   20/25        Physical Exam:     /84 (BP Location: Left arm, Patient Position: Sitting, Cuff Size: Large)   Pulse 60   Temp (!) 97 4 °F (36 3 °C) (Tympanic)   Resp 18   Ht 5' 9" (1 753 m)   Wt 107 kg (236 lb 9 6 oz)   SpO2 98%   BMI 34 94 kg/m²     Physical Exam  Constitutional:       Appearance: He is well-developed  HENT:      Head: Normocephalic  Right Ear: External ear normal       Left Ear: External ear normal       Nose: Nose normal    Eyes:      Conjunctiva/sclera: Conjunctivae normal       Pupils: Pupils are equal, round, and reactive to light  Cardiovascular:      Rate and Rhythm: Normal rate and regular rhythm        Heart sounds: Normal heart sounds  Pulmonary:      Effort: Pulmonary effort is normal       Breath sounds: Normal breath sounds  Abdominal:      General: Bowel sounds are normal       Palpations: Abdomen is soft  Musculoskeletal:         General: Normal range of motion  Cervical back: Normal range of motion and neck supple  Skin:     General: Skin is warm and dry  Neurological:      Mental Status: He is alert and oriented to person, place, and time  Psychiatric:         Behavior: Behavior normal          Thought Content:  Thought content normal          Judgment: Judgment normal           Chase Page, DO

## 2022-07-18 NOTE — PATIENT INSTRUCTIONS

## 2022-08-18 ENCOUNTER — TELEPHONE (OUTPATIENT)
Dept: PULMONOLOGY | Facility: CLINIC | Age: 72
End: 2022-08-18

## 2022-11-29 DIAGNOSIS — K21.9 GASTROESOPHAGEAL REFLUX DISEASE WITHOUT ESOPHAGITIS: ICD-10-CM

## 2022-11-29 RX ORDER — ESOMEPRAZOLE MAGNESIUM 40 MG/1
40 CAPSULE, DELAYED RELEASE ORAL DAILY
Qty: 90 CAPSULE | Refills: 1 | Status: SHIPPED | OUTPATIENT
Start: 2022-11-29

## 2022-11-30 ENCOUNTER — APPOINTMENT (OUTPATIENT)
Dept: LAB | Facility: CLINIC | Age: 72
End: 2022-11-30

## 2022-11-30 DIAGNOSIS — E78.5 HYPERLIPIDEMIA, UNSPECIFIED HYPERLIPIDEMIA TYPE: ICD-10-CM

## 2022-11-30 DIAGNOSIS — Z79.899 HIGH RISK MEDICATION USE: ICD-10-CM

## 2022-11-30 DIAGNOSIS — Z13.6 SCREENING FOR CARDIOVASCULAR CONDITION: ICD-10-CM

## 2022-11-30 LAB
BASOPHILS # BLD AUTO: 0.06 THOUSANDS/ÂΜL (ref 0–0.1)
BASOPHILS NFR BLD AUTO: 1 % (ref 0–1)
EOSINOPHIL # BLD AUTO: 0.11 THOUSAND/ÂΜL (ref 0–0.61)
EOSINOPHIL NFR BLD AUTO: 1 % (ref 0–6)
ERYTHROCYTE [DISTWIDTH] IN BLOOD BY AUTOMATED COUNT: 13.2 % (ref 11.6–15.1)
HCT VFR BLD AUTO: 47.8 % (ref 36.5–49.3)
HGB BLD-MCNC: 15.1 G/DL (ref 12–17)
IMM GRANULOCYTES # BLD AUTO: 0.07 THOUSAND/UL (ref 0–0.2)
IMM GRANULOCYTES NFR BLD AUTO: 1 % (ref 0–2)
LYMPHOCYTES # BLD AUTO: 3.04 THOUSANDS/ÂΜL (ref 0.6–4.47)
LYMPHOCYTES NFR BLD AUTO: 33 % (ref 14–44)
MCH RBC QN AUTO: 31.2 PG (ref 26.8–34.3)
MCHC RBC AUTO-ENTMCNC: 31.6 G/DL (ref 31.4–37.4)
MCV RBC AUTO: 99 FL (ref 82–98)
MONOCYTES # BLD AUTO: 0.67 THOUSAND/ÂΜL (ref 0.17–1.22)
MONOCYTES NFR BLD AUTO: 7 % (ref 4–12)
NEUTROPHILS # BLD AUTO: 5.17 THOUSANDS/ÂΜL (ref 1.85–7.62)
NEUTS SEG NFR BLD AUTO: 57 % (ref 43–75)
NRBC BLD AUTO-RTO: 0 /100 WBCS
PLATELET # BLD AUTO: 268 THOUSANDS/UL (ref 149–390)
PMV BLD AUTO: 10.8 FL (ref 8.9–12.7)
RBC # BLD AUTO: 4.84 MILLION/UL (ref 3.88–5.62)
WBC # BLD AUTO: 9.12 THOUSAND/UL (ref 4.31–10.16)

## 2022-12-01 LAB
ALBUMIN SERPL BCP-MCNC: 3.4 G/DL (ref 3.5–5)
ALP SERPL-CCNC: 96 U/L (ref 46–116)
ALT SERPL W P-5'-P-CCNC: 86 U/L (ref 12–78)
ANION GAP SERPL CALCULATED.3IONS-SCNC: 5 MMOL/L (ref 4–13)
AST SERPL W P-5'-P-CCNC: 71 U/L (ref 5–45)
BILIRUB SERPL-MCNC: 0.75 MG/DL (ref 0.2–1)
BUN SERPL-MCNC: 14 MG/DL (ref 5–25)
CALCIUM ALBUM COR SERPL-MCNC: 10 MG/DL (ref 8.3–10.1)
CALCIUM SERPL-MCNC: 9.5 MG/DL (ref 8.3–10.1)
CHLORIDE SERPL-SCNC: 103 MMOL/L (ref 96–108)
CHOLEST SERPL-MCNC: 179 MG/DL
CO2 SERPL-SCNC: 29 MMOL/L (ref 21–32)
CREAT SERPL-MCNC: 0.94 MG/DL (ref 0.6–1.3)
GFR SERPL CREATININE-BSD FRML MDRD: 81 ML/MIN/1.73SQ M
GLUCOSE P FAST SERPL-MCNC: 100 MG/DL (ref 65–99)
HDLC SERPL-MCNC: 53 MG/DL
LDLC SERPL CALC-MCNC: 111 MG/DL (ref 0–100)
NONHDLC SERPL-MCNC: 126 MG/DL
POTASSIUM SERPL-SCNC: 4.2 MMOL/L (ref 3.5–5.3)
PROT SERPL-MCNC: 7.7 G/DL (ref 6.4–8.4)
SODIUM SERPL-SCNC: 137 MMOL/L (ref 135–147)
TRIGL SERPL-MCNC: 74 MG/DL

## 2022-12-27 ENCOUNTER — OFFICE VISIT (OUTPATIENT)
Dept: FAMILY MEDICINE CLINIC | Facility: CLINIC | Age: 72
End: 2022-12-27

## 2022-12-27 VITALS
DIASTOLIC BLOOD PRESSURE: 82 MMHG | OXYGEN SATURATION: 97 % | HEIGHT: 69 IN | TEMPERATURE: 98.1 F | RESPIRATION RATE: 16 BRPM | WEIGHT: 248 LBS | SYSTOLIC BLOOD PRESSURE: 126 MMHG | HEART RATE: 93 BPM | BODY MASS INDEX: 36.73 KG/M2

## 2022-12-27 DIAGNOSIS — B96.89 ACUTE BRONCHITIS, BACTERIAL: ICD-10-CM

## 2022-12-27 DIAGNOSIS — J84.9 ILD (INTERSTITIAL LUNG DISEASE) (HCC): Primary | ICD-10-CM

## 2022-12-27 DIAGNOSIS — J20.8 ACUTE BRONCHITIS, BACTERIAL: ICD-10-CM

## 2022-12-27 RX ORDER — PREDNISONE 10 MG/1
TABLET ORAL
Qty: 21 TABLET | Refills: 0 | Status: SHIPPED | OUTPATIENT
Start: 2022-12-27

## 2022-12-27 RX ORDER — PREDNISONE 10 MG/1
TABLET ORAL
Qty: 21 TABLET | Refills: 0 | Status: SHIPPED | OUTPATIENT
Start: 2022-12-27 | End: 2022-12-27 | Stop reason: SDUPTHER

## 2022-12-27 RX ORDER — AMOXICILLIN AND CLAVULANATE POTASSIUM 875; 125 MG/1; MG/1
1 TABLET, FILM COATED ORAL EVERY 12 HOURS SCHEDULED
Qty: 14 TABLET | Refills: 0 | Status: SHIPPED | OUTPATIENT
Start: 2022-12-27 | End: 2022-12-27 | Stop reason: SDUPTHER

## 2022-12-27 RX ORDER — AMOXICILLIN AND CLAVULANATE POTASSIUM 875; 125 MG/1; MG/1
1 TABLET, FILM COATED ORAL EVERY 12 HOURS SCHEDULED
Qty: 14 TABLET | Refills: 0 | Status: SHIPPED | OUTPATIENT
Start: 2022-12-27 | End: 2023-01-03

## 2022-12-27 NOTE — PROGRESS NOTES
Assessment/Plan:     Diagnoses and all orders for this visit:    ILD (interstitial lung disease) ([facility] [address] )  -     Discontinue: amoxicillin-clavulanate (AUGMENTIN) 875-125 mg per tablet; Take 1 tablet by mouth every 12 (twelve) hours for 7 days  -     Discontinue: predniSONE 10 mg tablet; 6 tabs on Day 1,5 tabs on Day 2,4 tabs on Day 3,3 tabs on Day 4,2 tabs on  Day 5And 1 tab on Day 6  -     amoxicillin-clavulanate (AUGMENTIN) 875-125 mg per tablet; Take 1 tablet by mouth every 12 (twelve) hours for 7 days  -     predniSONE 10 mg tablet; 6 tabs on Day 1,5 tabs on Day 2,4 tabs on Day 3,3 tabs on Day 4,2 tabs on  Day 5And 1 tab on Day 6    Acute bronchitis, bacterial  -     Discontinue: amoxicillin-clavulanate (AUGMENTIN) 875-125 mg per tablet; Take 1 tablet by mouth every 12 (twelve) hours for 7 days  -     Discontinue: predniSONE 10 mg tablet; 6 tabs on Day 1,5 tabs on Day 2,4 tabs on Day 3,3 tabs on Day 4,2 tabs on  Day 5And 1 tab on Day 6  -     amoxicillin-clavulanate (AUGMENTIN) 875-125 mg per tablet; Take 1 tablet by mouth every 12 (twelve) hours for 7 days  -     predniSONE 10 mg tablet; 6 tabs on Day 1,5 tabs on Day 2,4 tabs on Day 3,3 tabs on Day 4,2 tabs on  Day 5And 1 tab on Day 6      Meds as above  Follow-up as needed  If not better will consider chest x-ray      Subjective:     Chief Complaint   Patient presents with   • Cough     productive   • Fatigue   • Nasal Congestion     Started roughly 10 days ago        Patient ID: Tori Ren is a 71 y o  male  Patient presents today for cough congestion  Started over 10 days ago  Patient is not improved and is gotten worse and now is coughing up thick colored phlegm      The following portions of the patient's history were reviewed and updated as appropriate: allergies, current medications, past family history, past medical history, past social history, past surgical history and problem list     Review of Systems   Constitutional: Negative      HENT: Negative  Eyes: Negative  Respiratory: Positive for cough  Cardiovascular: Negative  Gastrointestinal: Negative  Endocrine: Negative  Genitourinary: Negative  Musculoskeletal: Negative  Skin: Negative  Allergic/Immunologic: Negative  Neurological: Negative  Hematological: Negative  Psychiatric/Behavioral: Negative  All other systems reviewed and are negative  Objective:    Vitals:    12/27/22 1652   BP: 126/82   BP Location: Left arm   Patient Position: Sitting   Cuff Size: Large   Pulse: 93   Resp: 16   Temp: 98 1 °F (36 7 °C)   SpO2: 97%   Weight: 112 kg (248 lb)   Height: 5' 9" (1 753 m)          Physical Exam  Vitals and nursing note reviewed  Constitutional:       General: He is not in acute distress  Appearance: He is well-developed  HENT:      Head: Normocephalic  Right Ear: External ear normal       Left Ear: External ear normal       Nose: Nose normal    Eyes:      General:         Right eye: No discharge  Left eye: No discharge  Conjunctiva/sclera: Conjunctivae normal       Pupils: Pupils are equal, round, and reactive to light  Cardiovascular:      Rate and Rhythm: Normal rate and regular rhythm  Heart sounds: Normal heart sounds  Pulmonary:      Effort: Pulmonary effort is normal       Breath sounds: Normal breath sounds  Abdominal:      General: Bowel sounds are normal  There is no distension  Palpations: Abdomen is soft  Tenderness: There is no abdominal tenderness  Musculoskeletal:         General: Normal range of motion  Cervical back: Normal range of motion  Skin:     General: Skin is warm and dry  Findings: No rash  Neurological:      Mental Status: He is alert and oriented to person, place, and time  Cranial Nerves: No cranial nerve deficit  Psychiatric:         Behavior: Behavior normal          Thought Content:  Thought content normal          Judgment: Judgment normal

## 2023-01-11 ENCOUNTER — RA CDI HCC (OUTPATIENT)
Dept: OTHER | Facility: HOSPITAL | Age: 73
End: 2023-01-11

## 2023-01-11 NOTE — PROGRESS NOTES
[address]  coding opportunities       Chart reviewed, no opportunity found: CHART REVIEWED, NO OPPORTUNITY FOUND        Patients Insurance     Medicare Insurance: Medicare

## 2023-01-17 ENCOUNTER — OFFICE VISIT (OUTPATIENT)
Dept: FAMILY MEDICINE CLINIC | Facility: CLINIC | Age: 73
End: 2023-01-17

## 2023-01-17 VITALS
OXYGEN SATURATION: 96 % | TEMPERATURE: 99.6 F | BODY MASS INDEX: 36.58 KG/M2 | DIASTOLIC BLOOD PRESSURE: 72 MMHG | SYSTOLIC BLOOD PRESSURE: 124 MMHG | HEART RATE: 104 BPM | WEIGHT: 247 LBS | RESPIRATION RATE: 17 BRPM | HEIGHT: 69 IN

## 2023-01-17 DIAGNOSIS — E66.01 OBESITY, MORBID (HCC): ICD-10-CM

## 2023-01-17 DIAGNOSIS — J84.9 ILD (INTERSTITIAL LUNG DISEASE) (HCC): Primary | ICD-10-CM

## 2023-01-17 DIAGNOSIS — J20.8 ACUTE BRONCHITIS, BACTERIAL: ICD-10-CM

## 2023-01-17 DIAGNOSIS — B96.89 ACUTE BRONCHITIS, BACTERIAL: ICD-10-CM

## 2023-01-17 RX ORDER — PREDNISONE 10 MG/1
TABLET ORAL
Qty: 21 TABLET | Refills: 0 | Status: SHIPPED | OUTPATIENT
Start: 2023-01-17

## 2023-01-17 RX ORDER — CEFUROXIME AXETIL 500 MG/1
500 TABLET ORAL EVERY 12 HOURS SCHEDULED
Qty: 14 TABLET | Refills: 0 | Status: SHIPPED | OUTPATIENT
Start: 2023-01-17 | End: 2023-01-24

## 2023-01-17 NOTE — PROGRESS NOTES
Assessment/Plan:     Diagnoses and all orders for this visit:    ILD (interstitial lung disease) ([facility] [address] )  -     cefuroxime (CEFTIN) 500 mg tablet; Take 1 tablet (500 mg total) by mouth every 12 (twelve) hours for 7 days    Acute bronchitis, bacterial  -     predniSONE 10 mg tablet; 6 tabs on Day 1,5 tabs on Day 2,4 tabs on Day 3,3 tabs on Day 4,2 tabs on  Day 5And 1 tab on Day 6  -     cefuroxime (CEFTIN) 500 mg tablet; Take 1 tablet (500 mg total) by mouth every 12 (twelve) hours for 7 days    Obesity, morbid ([facility] [address] )  This is currently stable      Meds as above  Follow-up as needed      Subjective:     Chief Complaint   Patient presents with   • Sick visit     Patient reports that he's had body aches, chest congestion, nasal congestion, cough, and fatigue that started yesterday  Patient tested negative for COVID this morning  Patient is taking antihistamines for his symptoms  Patient ID: Madison Palacio is a 71 y o  male  Patient presents today for cough congestion  Symptoms started yesterday  He tested negative for COVID this morning  He has been taking allergy meds but has not helped        The following portions of the patient's history were reviewed and updated as appropriate: allergies, current medications, past family history, past medical history, past social history, past surgical history and problem list     Review of Systems   Constitutional: Positive for fatigue  HENT: Positive for congestion  Eyes: Negative  Respiratory: Positive for cough  Cardiovascular: Negative  Gastrointestinal: Negative  Endocrine: Negative  Genitourinary: Negative  Musculoskeletal: Negative  Skin: Negative  Allergic/Immunologic: Negative  Neurological: Negative  Hematological: Negative  Psychiatric/Behavioral: Negative  All other systems reviewed and are negative          Objective:    Vitals:    01/17/23 1128   BP: 124/72   BP Location: Left arm   Patient Position: Sitting   Cuff Size: Large   Pulse: 104   Resp: 17   Temp: 99 6 °F (37 6 °C)   TempSrc: Tympanic   SpO2: 96%   Weight: 112 kg (247 lb)   Height: 5' 9" (1 753 m)          Physical Exam  Vitals and nursing note reviewed  Constitutional:       General: He is not in acute distress  Appearance: He is well-developed  HENT:      Head: Normocephalic  Right Ear: External ear normal       Left Ear: External ear normal       Nose: Nose normal    Eyes:      General:         Right eye: No discharge  Left eye: No discharge  Conjunctiva/sclera: Conjunctivae normal       Pupils: Pupils are equal, round, and reactive to light  Cardiovascular:      Rate and Rhythm: Normal rate and regular rhythm  Heart sounds: Normal heart sounds  Pulmonary:      Effort: Pulmonary effort is normal       Breath sounds: Normal breath sounds  Abdominal:      General: Bowel sounds are normal  There is no distension  Palpations: Abdomen is soft  Tenderness: There is no abdominal tenderness  Musculoskeletal:         General: Normal range of motion  Cervical back: Normal range of motion  Skin:     General: Skin is warm and dry  Findings: No rash  Neurological:      Mental Status: He is alert and oriented to person, place, and time  Cranial Nerves: No cranial nerve deficit  Psychiatric:         Behavior: Behavior normal          Thought Content:  Thought content normal          Judgment: Judgment normal

## 2023-02-08 DIAGNOSIS — E78.5 HYPERLIPIDEMIA, UNSPECIFIED HYPERLIPIDEMIA TYPE: ICD-10-CM

## 2023-02-08 RX ORDER — SIMVASTATIN 40 MG
40 TABLET ORAL
Qty: 90 TABLET | Refills: 1 | Status: SHIPPED | OUTPATIENT
Start: 2023-02-08

## 2023-04-12 PROBLEM — J45.909 UNCOMPLICATED ASTHMA: Status: ACTIVE | Noted: 2023-04-12

## 2023-04-12 PROBLEM — R91.1 LUNG NODULE: Status: ACTIVE | Noted: 2023-04-12

## 2023-04-26 ENCOUNTER — APPOINTMENT (OUTPATIENT)
Dept: LAB | Facility: CLINIC | Age: 73
End: 2023-04-26

## 2023-04-26 DIAGNOSIS — J45.909 UNCOMPLICATED ASTHMA, UNSPECIFIED ASTHMA SEVERITY, UNSPECIFIED WHETHER PERSISTENT: ICD-10-CM

## 2023-04-26 LAB
BASOPHILS # BLD AUTO: 0.08 THOUSANDS/ΜL (ref 0–0.1)
BASOPHILS NFR BLD AUTO: 1 % (ref 0–1)
EOSINOPHIL # BLD AUTO: 0.09 THOUSAND/ΜL (ref 0–0.61)
EOSINOPHIL NFR BLD AUTO: 1 % (ref 0–6)
ERYTHROCYTE [DISTWIDTH] IN BLOOD BY AUTOMATED COUNT: 13.5 % (ref 11.6–15.1)
HCT VFR BLD AUTO: 48.1 % (ref 36.5–49.3)
HGB BLD-MCNC: 15.3 G/DL (ref 12–17)
IMM GRANULOCYTES # BLD AUTO: 0.07 THOUSAND/UL (ref 0–0.2)
IMM GRANULOCYTES NFR BLD AUTO: 1 % (ref 0–2)
LYMPHOCYTES # BLD AUTO: 3.02 THOUSANDS/ΜL (ref 0.6–4.47)
LYMPHOCYTES NFR BLD AUTO: 29 % (ref 14–44)
MCH RBC QN AUTO: 31.2 PG (ref 26.8–34.3)
MCHC RBC AUTO-ENTMCNC: 31.8 G/DL (ref 31.4–37.4)
MCV RBC AUTO: 98 FL (ref 82–98)
MONOCYTES # BLD AUTO: 0.88 THOUSAND/ΜL (ref 0.17–1.22)
MONOCYTES NFR BLD AUTO: 9 % (ref 4–12)
NEUTROPHILS # BLD AUTO: 6.22 THOUSANDS/ΜL (ref 1.85–7.62)
NEUTS SEG NFR BLD AUTO: 59 % (ref 43–75)
NRBC BLD AUTO-RTO: 0 /100 WBCS
PLATELET # BLD AUTO: 257 THOUSANDS/UL (ref 149–390)
PMV BLD AUTO: 10.8 FL (ref 8.9–12.7)
RBC # BLD AUTO: 4.91 MILLION/UL (ref 3.88–5.62)
WBC # BLD AUTO: 10.36 THOUSAND/UL (ref 4.31–10.16)

## 2023-05-01 LAB

## 2023-05-22 ENCOUNTER — HOSPITAL ENCOUNTER (OUTPATIENT)
Dept: CT IMAGING | Facility: HOSPITAL | Age: 73
Discharge: HOME | End: 2023-05-22

## 2023-05-22 DIAGNOSIS — R91.1 LUNG NODULE: ICD-10-CM

## 2023-05-25 ENCOUNTER — TELEPHONE (OUTPATIENT)
Dept: OTHER | Facility: HOSPITAL | Age: 73
End: 2023-05-25

## 2023-05-26 NOTE — TELEPHONE ENCOUNTER
Left VM: nodule stable since 2016- reticular changes at the bases also stable will address any further questions on 6/1

## 2023-06-01 ENCOUNTER — OFFICE VISIT (OUTPATIENT)
Dept: PULMONOLOGY | Facility: CLINIC | Age: 73
End: 2023-06-01

## 2023-06-01 VITALS
HEIGHT: 69 IN | BODY MASS INDEX: 37.47 KG/M2 | DIASTOLIC BLOOD PRESSURE: 80 MMHG | SYSTOLIC BLOOD PRESSURE: 122 MMHG | OXYGEN SATURATION: 98 % | HEART RATE: 70 BPM | WEIGHT: 253 LBS

## 2023-06-01 DIAGNOSIS — K21.9 GASTROESOPHAGEAL REFLUX DISEASE WITHOUT ESOPHAGITIS: ICD-10-CM

## 2023-06-01 DIAGNOSIS — J43.2 CENTRILOBULAR EMPHYSEMA (HCC): ICD-10-CM

## 2023-06-01 DIAGNOSIS — J84.9 ILD (INTERSTITIAL LUNG DISEASE) (HCC): Primary | ICD-10-CM

## 2023-06-01 DIAGNOSIS — J30.9 ALLERGIC RHINITIS, UNSPECIFIED SEASONALITY, UNSPECIFIED TRIGGER: ICD-10-CM

## 2023-06-01 PROBLEM — N99.114 POSTPROCEDURAL ANTERIOR URETHRAL STRICTURE: Status: RESOLVED | Noted: 2019-05-14 | Resolved: 2023-06-01

## 2023-06-01 PROBLEM — N39.0 UTI (URINARY TRACT INFECTION): Status: RESOLVED | Noted: 2019-08-16 | Resolved: 2023-06-01

## 2023-06-01 PROBLEM — J45.909 UNCOMPLICATED ASTHMA: Status: RESOLVED | Noted: 2023-04-12 | Resolved: 2023-06-01

## 2023-06-01 RX ORDER — ALBUTEROL SULFATE 90 UG/1
2 AEROSOL, METERED RESPIRATORY (INHALATION) EVERY 6 HOURS PRN
Qty: 18 G | Refills: 3 | Status: SHIPPED | OUTPATIENT
Start: 2023-06-01

## 2023-06-01 RX ORDER — ESOMEPRAZOLE MAGNESIUM 40 MG/1
40 CAPSULE, DELAYED RELEASE ORAL DAILY
Qty: 180 CAPSULE | Refills: 1 | Status: SHIPPED | OUTPATIENT
Start: 2023-06-01

## 2023-06-01 RX ORDER — LEVOCETIRIZINE DIHYDROCHLORIDE 5 MG/1
5 TABLET, FILM COATED ORAL EVERY EVENING
COMMUNITY

## 2023-06-01 NOTE — ASSESSMENT & PLAN NOTE
· Previous [place] allergen test was performed while on prednisone    This is likely not an accurate test and therefore did not show any significant allergen sensitivities  · He does have benefit from Xyzal and should continued regular use  · No need to add nasal sprays at this time, continue present management

## 2023-06-01 NOTE — ASSESSMENT & PLAN NOTE
· Radiographically does appear relatively stable dating back to 2019  · Only has 1 pulmonary function test in 2020 which showed mild restriction and diffusion impairment    Total lung capacity however was normal  · Advised updated pulmonary function test for comparative purposes  · No indication for antifibrotic agents at this time

## 2023-06-01 NOTE — ASSESSMENT & PLAN NOTE
· CT scan imaging does show mild emphysema, this is not likely contributing substantially to his pulmonary symptomatology  · I have recommended that he maintain a rescue albuterol inhaler since he did have some benefit from this during his acute illness  · Breztri therapy was not beneficial and will not be continued  · Does not have obstructive lung disease on prior pulmonary function testing    · May have component of chronic bronchitis but this seems to be tied more closely to his allergic rhinitis and postnasal drip

## 2023-06-01 NOTE — PROGRESS NOTES
Progress note - Pulmonary Medicine   Luz Correa 68 y o  male MRN: [id number]       Impression & Plan:     ILD (interstitial lung disease) ([address] )  · Radiographically does appear relatively stable dating back to 2019  · Only has 1 pulmonary function test in 2020 which showed mild restriction and diffusion impairment  Total lung capacity however was normal  · Advised updated pulmonary function test for comparative purposes  · No indication for antifibrotic agents at this time    Centrilobular emphysema (HCC)  · CT scan imaging does show mild emphysema, this is not likely contributing substantially to his pulmonary symptomatology  · I have recommended that he maintain a rescue albuterol inhaler since he did have some benefit from this during his acute illness  · Breztri therapy was not beneficial and will not be continued  · Does not have obstructive lung disease on prior pulmonary function testing  · May have component of chronic bronchitis but this seems to be tied more closely to his allergic rhinitis and postnasal drip        Allergic rhinitis  · Previous [place] allergen test was performed while on prednisone  This is likely not an accurate test and therefore did not show any significant allergen sensitivities  · He does have benefit from Xyzal and should continued regular use  · No need to add nasal sprays at this time, continue present management    Follow-up will be dependent upon the results of the pulmonary function test but would anticipate 6 months to 1 year follow-up  ______________________________________________________________________    HPI:    Luz Correa presents today for follow-up of interstitial lung disease and possible component of COPD  He did recently have a CAT scan showing overall stable interstitial changes  He has been very concerned recently due to a a period of protracted cough after bronchitis which left him with prolonged symptoms    He is now feeling better and the cough has ultimately improved  No chest pain or cardiac complaints  He is currently not on any inhalers other than albuterol  He did try Breztri without any significant benefit  He does not currently take any prednisone  No appetite change or weight change  No headache or neurologic symptoms  No new arthralgias or myalgias      Current Medications:    Current Outpatient Medications:   •  albuterol (PROVENTIL HFA,VENTOLIN HFA) 90 mcg/act inhaler, Inhale 2 puffs every 6 (six) hours as needed for wheezing, Disp: 18 g, Rfl: 3  •  Cholecalciferol (VITAMIN D PO), Take by mouth, Disp: , Rfl:   •  Coenzyme Q10 (CO Q 10 PO), Take 200 mg by mouth, Disp: , Rfl:   •  esomeprazole (NexIUM) 40 MG capsule, Take 1 capsule (40 mg total) by mouth daily, Disp: 90 capsule, Rfl: 1  •  levocetirizine (XYZAL) 5 MG tablet, Take 5 mg by mouth every evening, Disp: , Rfl:   •  simvastatin (ZOCOR) 40 mg tablet, Take 1 tablet (40 mg total) by mouth daily at bedtime, Disp: 90 tablet, Rfl: 1    Review of Systems:  Answers for HPI/ROS submitted by the patient on 5/26/2023  Do you have a cough?: Yes  Do you have a wet cough?: Yes  Do you have wheezing?: Yes  Chronicity: recurrent  When did you first notice your symptoms?: more than 1 month ago  How often do your symptoms occur?: intermittently  Since you first noticed this problem, how has it changed?: gradually improving  Have you had a change in appetite?: No  Do you have chest pain?: No  Do you have shortness of breath that occurs with effort or exertion?: No  Do you have ear congestion?: No  Do you have ear pain?: No  Do you have a fever?: No  Do you have headaches?: No  Do you have heartburn?: No  Do you have fatigue?: Yes  Do you have muscle pain?: No  Do you have nasal congestion?: Yes  Do you have shortness of breath when lying flat?: No  Do you have shortness of breath when you wake up?: No  Do you have post-nasal drip?: Yes  Do you have a runny nose?: Yes  Do you have sneezing?: Yes  Do "you have a sore throat?: No  Do you have sweats?: No  Do you have trouble swallowing?: No  Have you experienced weight loss?: No  Which of the following makes your symptoms worse?: URI  Which of the following makes your symptoms better?: prescription cough suppressant  Risk factors for lung disease: smoking/tobacco exposure    Aside from what is mentioned in the HPI, the review of systems is otherwise negative    Past medical history, surgical history, and family history were reviewed and updated as appropriate    Social history updates:  Social History     Tobacco Use   Smoking Status Former   • Packs/day: 1 00   • Years: 40 00   • Total pack years: 40 00   • Types: Cigarettes   • Start date: 9/15/1969   • Quit date: 2014   • Years since quittin 4   Smokeless Tobacco Former       PhysicalExamination:  Vitals:   /80 (BP Location: Left arm, Patient Position: Sitting, Cuff Size: Large)   Pulse 70   Ht 5' 9\" (1 753 m)   Wt 115 kg (253 lb)   SpO2 98%   BMI 37 36 kg/m²   Gen:  Comfortable on room air  No conversational dyspnea  HEENT:  Conjugate gaze  sclerae anicteric  Oropharynx moist  Neck: Trachea is midline  No JVD  No adenopathy  Chest: Symmetric but distant breath sounds  No rales or crackles are appreciated  Cardiac: Regular  no murmur  Abdomen: Obese  Benign  Extremities: No edema  Neuro:  Normal speech and mentation    Diagnostic Data:  Labs: I personally reviewed the most recent laboratory data pertinent to today's visit    Lab Results   Component Value Date    HCT 48 1 2023    HGB 15 3 2023    MCV 98 2023     2023    WBC 10 36 (H) 2023     Lab Results   Component Value Date    BUN 14 2022    CALCIUM 9 5 2022     2022    CO2 29 2022    CREATININE 0 94 2022    GLUCOSE 105 2015    K 4 2 2022    SODIUM 137 2022       PFT results:  PFTs were remote  These were in     No recent studies " available  Imaging:  I personally reviewed the images on the [facility] system pertinent to today's visit  5/22/2023 CT scan of the chest shows stable interstitial lung disease dating back to November 2019 study    Stable benign 6 mm nodule    Meron Meza MD

## 2023-06-12 ENCOUNTER — TELEPHONE (OUTPATIENT)
Dept: OBGYN CLINIC | Facility: HOSPITAL | Age: 73
End: 2023-06-12

## 2023-06-12 ENCOUNTER — HOSPITAL ENCOUNTER (OUTPATIENT)
Dept: PULMONOLOGY | Facility: HOSPITAL | Age: 73
Discharge: HOME | End: 2023-06-12
Attending: INTERNAL MEDICINE
Payer: MEDICARE

## 2023-06-12 DIAGNOSIS — J84.9 ILD (INTERSTITIAL LUNG DISEASE) (HCC): ICD-10-CM

## 2023-06-12 PROCEDURE — 94726 PLETHYSMOGRAPHY LUNG VOLUMES: CPT

## 2023-06-12 PROCEDURE — 94760 N-INVAS EAR/PLS OXIMETRY 1: CPT

## 2023-06-12 PROCEDURE — 94060 EVALUATION OF WHEEZING: CPT

## 2023-06-12 PROCEDURE — 94060 EVALUATION OF WHEEZING: CPT | Performed by: INTERNAL MEDICINE

## 2023-06-12 PROCEDURE — 94726 PLETHYSMOGRAPHY LUNG VOLUMES: CPT | Performed by: INTERNAL MEDICINE

## 2023-06-12 PROCEDURE — 94729 DIFFUSING CAPACITY: CPT | Performed by: INTERNAL MEDICINE

## 2023-06-12 PROCEDURE — 94729 DIFFUSING CAPACITY: CPT

## 2023-06-12 RX ORDER — ALBUTEROL SULFATE 2.5 MG/3ML
2.5 SOLUTION RESPIRATORY (INHALATION) ONCE
Status: COMPLETED | OUTPATIENT
Start: 2023-06-12 | End: 2023-06-12

## 2023-06-12 RX ADMIN — ALBUTEROL SULFATE 2.5 MG: 2.5 SOLUTION RESPIRATORY (INHALATION) at 11:09

## 2023-06-12 NOTE — TELEPHONE ENCOUNTER
Caller: Joseph Merino    Doctor: May Mcconnell / Natalia    Reason for call: requesting sooner appointment if one becomes available     Call back#: [phone]

## 2023-07-12 ENCOUNTER — APPOINTMENT (OUTPATIENT)
Dept: RADIOLOGY | Facility: MEDICAL CENTER | Age: 73
End: 2023-07-12
Payer: MEDICARE

## 2023-07-12 ENCOUNTER — OFFICE VISIT (OUTPATIENT)
Dept: OBGYN CLINIC | Facility: MEDICAL CENTER | Age: 73
End: 2023-07-12
Payer: MEDICARE

## 2023-07-12 VITALS
BODY MASS INDEX: 37.33 KG/M2 | SYSTOLIC BLOOD PRESSURE: 124 MMHG | WEIGHT: 252 LBS | DIASTOLIC BLOOD PRESSURE: 78 MMHG | HEIGHT: 69 IN | HEART RATE: 72 BPM

## 2023-07-12 DIAGNOSIS — M17.11 PRIMARY OSTEOARTHRITIS OF RIGHT KNEE: ICD-10-CM

## 2023-07-12 DIAGNOSIS — M25.561 RIGHT KNEE PAIN, UNSPECIFIED CHRONICITY: ICD-10-CM

## 2023-07-12 DIAGNOSIS — M25.561 ACUTE PAIN OF RIGHT KNEE: Primary | ICD-10-CM

## 2023-07-12 PROCEDURE — 20610 DRAIN/INJ JOINT/BURSA W/O US: CPT | Performed by: EMERGENCY MEDICINE

## 2023-07-12 PROCEDURE — 99213 OFFICE O/P EST LOW 20 MIN: CPT | Performed by: EMERGENCY MEDICINE

## 2023-07-12 PROCEDURE — 73564 X-RAY EXAM KNEE 4 OR MORE: CPT

## 2023-07-12 RX ORDER — TRIAMCINOLONE ACETONIDE 40 MG/ML
40 INJECTION, SUSPENSION INTRA-ARTICULAR; INTRAMUSCULAR
Status: COMPLETED | OUTPATIENT
Start: 2023-07-12 | End: 2023-07-12

## 2023-07-12 RX ORDER — BUPIVACAINE HYDROCHLORIDE 2.5 MG/ML
4 INJECTION, SOLUTION INFILTRATION; PERINEURAL
Status: COMPLETED | OUTPATIENT
Start: 2023-07-12 | End: 2023-07-12

## 2023-07-12 RX ADMIN — TRIAMCINOLONE ACETONIDE 40 MG: 40 INJECTION, SUSPENSION INTRA-ARTICULAR; INTRAMUSCULAR at 11:00

## 2023-07-12 RX ADMIN — BUPIVACAINE HYDROCHLORIDE 4 ML: 2.5 INJECTION, SOLUTION INFILTRATION; PERINEURAL at 11:00

## 2023-07-12 NOTE — PROGRESS NOTES
Assessment/Plan:    Diagnoses and all orders for this visit:    Acute pain of right knee  -     XR knee 4+ vw right injury; Future  -     Large joint arthrocentesis: R knee    Primary osteoarthritis of right knee  -     Large joint arthrocentesis: R knee    You may use Advil (ibuprofen) 600mg every 6 hours or at least twice per day OR Aleve (naproxen) 250-500mg every 12 hours as needed for pain and inflammation. You may also take Tylenol 500mg every 4-6 hours as needed OR max 1,000mg per dose up to 3 times per day for a total of 3,000mg per day  Check with your primary care physician to see if these medications are safe to take and to make sure they do not interfere with your other medications and medical issues. Discussed [place]    Return in about 3 months (around 10/12/2023). Subjective:   Patient ID: Afua Hughes is a 68 y.o. male. NP presents for about a month of Right anterior medial knee pain so bad he couldn't stand on right leg. Taking OTC Boswellia as well as well as Hyaluronic , also taking Tylenol and using knee brace. Review of Systems    The following portions of the patient's chart were reviewed and updated as appropriate:    Allergy:    Allergies   Allergen Reactions   • Pollen Extract Allergic Rhinitis       Medications:    Current Outpatient Medications:   •  albuterol (PROVENTIL HFA,VENTOLIN HFA) 90 mcg/act inhaler, Inhale 2 puffs every 6 (six) hours as needed for wheezing, Disp: 18 g, Rfl: 3  •  Cholecalciferol (VITAMIN D PO), Take by mouth, Disp: , Rfl:   •  Coenzyme Q10 (CO Q 10 PO), Take 200 mg by mouth, Disp: , Rfl:   •  esomeprazole (NexIUM) 40 MG capsule, Take 1 capsule (40 mg total) by mouth daily, Disp: 180 capsule, Rfl: 1  •  levocetirizine (XYZAL) 5 MG tablet, Take 5 mg by mouth every evening, Disp: , Rfl:   •  simvastatin (ZOCOR) 40 mg tablet, Take 1 tablet (40 mg total) by mouth daily at bedtime, Disp: 90 tablet, Rfl: 1    Patient Active Problem List Diagnosis   • Carpal tunnel syndrome of left wrist   • Tendinitis of left rotator cuff   • Biceps tendonitis on left   • Benign colon polyp   • Hyperlipidemia   • Peyronie's disease   • GERD without esophagitis   • Former smoker -quit 2014   • Frequent urination   • Other urethral stricture, male, unspecified site   • Centrilobular emphysema ([address])   • ILD (interstitial lung disease) ([address])   • Obesity, morbid (HCC)   • Allergic rhinitis   • Lung nodule       Objective:  /78   Pulse 72   Ht 5' 9" (1.753 m)   Wt 114 kg (252 lb)   BMI 37.21 kg/m²     Right Knee Exam     Tenderness   The patient is experiencing tenderness in the medial joint line. Range of Motion   The patient has normal right knee ROM. Other   Erythema: absent  Sensation: normal  Swelling: mild            Physical Exam      Neurologic Exam    Large joint arthrocentesis: R knee  Universal Protocol:  Consent: Verbal consent obtained. Risks and benefits: risks, benefits and alternatives were discussed  Consent given by: patient  Time out: Immediately prior to procedure a "time out" was called to verify the correct patient, procedure, equipment, support staff and site/side marked as required.   Timeout called at: 7/12/2023 11:36 AM.  Patient understanding: patient states understanding of the procedure being performed  Test results: test results available and properly labeled  Site marked: the operative site was marked  Patient identity confirmed: verbally with patient    Supporting Documentation  Indications: pain   Procedure Details  Location: knee - R knee  Preparation: Patient was prepped and draped in the usual sterile fashion  Needle size: 22 G  Ultrasound guidance: no  Approach: anterolateral  Medications administered: 40 mg triamcinolone acetonide 40 mg/mL; 4 mL bupivacaine 0.25 %    Patient tolerance: patient tolerated the procedure well with no immediate complications  Dressing:  Sterile dressing applied    No erythema of knee(s)          I have personally reviewed pertinent films in PACS and my interpretation is Xrays Right Knee show mild DJD.             Past Medical History:   Diagnosis Date   • AC joint arthropathy     right   • Allergic 2018   • Arthritis 2020   • BPH (benign prostatic hyperplasia)    • Carpal tunnel syndrome     bilateral   • Chronic prostatitis     last assessed: 3/1/2014   • Chronic UTI (urinary tract infection)    • Colon polyp    • Diverticulitis     twice   • Ganglion     last assessed: 10/7/2013   • GERD (gastroesophageal reflux disease)    • GERD without esophagitis    • Hyperlipidemia    • Impingement syndrome of right shoulder     last assessed: 3/22/2016   • Left inguinal hernia     description: repaired by Dr. Florence Wu    • Peyronie's disease     had surgery   • Pneumonia 2019   • Pneumonia of left upper lobe due to infectious organism     last assessed: 2/8/2016   • Sexual dysfunction    • Stomach disorder    • Urinary tract infection 2020   • Wears glasses        Past Surgical History:   Procedure Laterality Date   • COLONOSCOPY     • COLONOSCOPY     • CYSTOSCOPY      Diagnostic last assessed: 6/12/2014   • CYSTOSTOMY      urethral stricture   • HERNIA REPAIR      mesh left inguinal   • NEUROPLASTY / TRANSPOSITION MEDIAN NERVE AT CARPAL TUNNEL     • NE CYSTO INSERTION TRANSPROSTATIC IMPLANT SINGLE N/A 6/23/2020    Procedure: CYSTOSCOPY, URETHRAL DILATION,  WITH INSERTION UROLIFT;  Surgeon: Phill Hanna MD;  Location[facility];  Service: Urology   • NE ENDOSCOPIC PLANTAR FASCIOTOMY Left 11/18/2016    Procedure: PLANTAR FASCIOTOMY;  Surgeon: Juwan Castellano DPM;  Location: [facility];  Service: Podiatry   • NE EXCISION GANGLION WRIST DORSAL/VOLAR PRIMARY Left 1/24/2018    Procedure: WRIST RESECTION VOLAR GANGLION CYST;  Surgeon: Azalia Booker MD;  Location: [facility];  Service: Orthopedics   • NE NEUROPLASTY &/TRANSPOS MEDIAN NRV CARPAL V[place]a Guile Right 8/24/2016    Procedure: CARPAL TUNNEL RELEASE ; Surgeon: Andrez Loja MD;  Location:  MAIN OR;  Service: Orthopedics   • WI NEUROPLASTY &/TRANSPOS MEDIAN NRV CARPAL [place]ra Mt Left 2018    Procedure: RELEASE CARPAL TUNNEL;  Surgeon: Andrez Loja MD;  Location:  MAIN OR;  Service: Orthopedics       Social History     Socioeconomic History   • Marital status:      Spouse name: Not on file   • Number of children: 1   • Years of education: Not on file   • Highest education level: Not on file   Occupational History   • Occupation: Working full time    Tobacco Use   • Smoking status: Former     Packs/day: 1.00     Years: 40.00     Total pack years: 40.00     Types: Cigarettes     Start date: 9/15/1969     Quit date: 2014     Years since quittin.5   • Smokeless tobacco: Former   Vaping Use   • Vaping Use: Never used   Substance and Sexual Activity   • Alcohol use: Yes     Alcohol/week: 3.0 standard drinks of alcohol     Types: 3 Standard drinks or equivalent per week     Comment: social and denies alcohol use causing problems    • Drug use: No   • Sexual activity: Not Currently     Partners: Female   Other Topics Concern   • Not on file   Social History Narrative   • Not on file     Social Determinants of Health     Financial Resource Strain: Low Risk  (10/8/2020)    Overall Financial Resource Strain (CARDIA)    • Difficulty of Paying Living Expenses: Not hard at all   Food Insecurity: No Food Insecurity (10/8/2020)    Hunger Vital Sign    • Worried About Running Out of Food in the Last Year: Never true    • Ran Out of Food in the Last Year: Never true   Transportation Needs: No Transportation Needs (10/8/2020)    PRAPARE - Transportation    • Lack of Transportation (Medical): No    • Lack of Transportation (Non-Medical):  No   Physical Activity: Inactive (2020)    Exercise Vital Sign    • Days of Exercise per Week: 0 days    • Minutes of Exercise per Session: 0 min   Stress: No Stress Concern Present (2020)    [employer] of Occupational Health - Occupational Stress Questionnaire    • Feeling of Stress : Not at all   Social Connections: Socially Isolated (6/29/2020)    Social Connection and Isolation Panel [NHANES]    • Frequency of Communication with Friends and Family: Twice a week    • Frequency of Social Gatherings with Friends and Family: Once a week    • Attends Congregational Services: Never    • Active Member of Clubs or Organizations: No    • Attends Club or Organization Meetings: Never    • Marital Status:    Intimate Partner Violence: Not At Risk (7/18/2022)    Humiliation, Afraid, Rape, and Kick questionnaire    • Fear of Current or Ex-Partner: No    • Emotionally Abused: No    • Physically Abused: No    • Sexually Abused: No   Housing Stability: Not on file       Family History   Problem Relation Age of Onset   • Cancer Father         bladder   • Colon polyps Neg Hx    • Colon cancer Neg Hx

## 2023-07-12 NOTE — PATIENT INSTRUCTIONS
You may use Advil (ibuprofen) 600mg every 6 hours or at least twice per day OR Aleve (naproxen) 250-500mg every 12 hours as needed for pain and inflammation. You may also take Tylenol 500mg every 4-6 hours as needed OR max 1,000mg per dose up to 3 times per day for a total of 3,000mg per day  Check with your primary care physician to see if these medications are safe to take and to make sure they do not interfere with your other medications and medical issues. In order to minimize further degenerative changes and pain from arthritis we recommend maintaining an active lifestyle and continually trying to maintain a healthy BMI. I recommended avoiding any tobacco use. On rainy days and cold days you may have worsening pain than baseline. [facility]. org for more information     Steroid Joint Injection   AMBULATORY CARE:   What you need to know about steroid joint injection:  A steroid joint injection is a procedure to inject steroid medicine into a joint. Steroid medicine decreases pain and inflammation. The injection may also contain an anesthetic (numbing medicine) to decrease pain. It may be done to treat conditions such as arthritis, gout, or carpal tunnel syndrome. The injections may be given in your knee, ankle, shoulder, elbow, wrist, or ankle. How to prepare for steroid joint injection:  Your healthcare provider will talk to you about how to prepare for this procedure. He will tell you what medicines to take or not take on the day of your procedure. You may need to stop taking blood thinners several days before your procedure. What will happen during steroid joint injection:  You may be given local anesthesia to numb the area where the injection will be given. With local anesthesia, you may still feel pressure during the procedure, but you should not feel any pain. Your healthcare provider may use ultrasound or fluoroscopy (a type of x-ray) to guide the needle to the right area.  He will then inject the steroid into your joint. A bandage will be placed on the injection site. What will happen after steroid joint injection:  You may have redness, warmth, or sweating in your face and chest right after the steroid injection. Steroids can affect blood sugar levels. If you have diabetes, you should check your blood sugars closely in the first 24 hours after your procedure. Risks of steroid joint injection:  You may get an infection in your joint. The injection may also cause more pain during the first 24 to 36 hours. You may need more than one injection to feel pain relief. The skin near the injection site may be damaged and become discolored or indented. This can happen if the steroid is placed too close to your skin. A tendon near the injection site may rupture or a nerve can be damaged. Contact your healthcare provider if:   You have fever or chills. You have redness or swelling in the injection site. You have more pain than usual in your joint for more than 72 hours. You have questions or concerns about your condition or care. Medicines:   Pain medicine  may be given. Ask how to take this medicine safely. Take your medicine as directed. Contact your healthcare provider if you think your medicine is not helping or if you have side effects. Tell him or her if you are allergic to any medicine. Keep a list of the medicines, vitamins, and herbs you take. Include the amounts, and when and why you take them. Bring the list or the pill bottles to follow-up visits. Carry your medicine list with you in case of an emergency. Self-care:   Leave the bandage on for 8 to 12 hours. Care for your wound as directed. Rest the area  as directed. You may need to decrease weight on certain joints, such as the knee, for a period of time. Ask when you can return to your daily activities. Elevate  your limb where the steroid injection was given.  Elevate the limb above the level of your heart as often as you can. This will help decrease swelling and pain. Prop your limb on pillows or blankets to keep it elevated comfortably. Apply ice  on your joint for 15 to 20 minutes every hour or as directed. Use an ice pack, or put crushed ice in a plastic bag. Cover it with a towel. Ice helps prevent tissue damage and decreases swelling and pain. Follow up with your healthcare provider as directed:  Write down your questions so you remember to ask them during your visits. © 2017 5 [facility] Information is for End User's use only and may not be sold, redistributed or otherwise used for commercial purposes. All illustrations and images included in CareNotes® are the copyrighted property of [employer]. or [employer]. The above information is an  only. It is not intended as medical advice for individual conditions or treatments. Talk to your doctor, nurse or pharmacist before following any medical regimen to see if it is safe and effective for you. Arthritis   AMBULATORY CARE:   Arthritis  is a disease that causes inflammation in one or more joints. There are many types of arthritis, such as osteoarthritis, rheumatoid arthritis, and septic arthritis. Some types cause inflammation in the joints. Other types wear away the cartilage between joints. This makes the bones of the joint rub together when you move the joint. Your symptoms may be constant, or symptoms may come and go. Arthritis often gets worse over time and can cause permanent joint damage. Common signs and symptoms of arthritis:   Pain, swelling, or stiffness in the joint    Limited range of motion in the joint    Warmth or redness over the joint    Tenderness when you touch the joint    Stiff joints in the morning that loosen with movement    A creaking or grinding sound when you move the joint    Fever  Seek care immediately if:   You have a fever and severe joint pain or swelling.      You cannot move the affected joint. You have severe joint pain you cannot tolerate. Contact your healthcare provider if:   Your pain or swelling does not get better with treatment. You have questions or concerns about your condition or care. Treatment  will depend on the type of arthritis you have and if it is severe. You may need any of the following:  Acetaminophen  decreases pain and fever. It is available without a doctor's order. Ask how much to take and how often to take it. Follow directions. Acetaminophen can cause liver damage if not taken correctly. NSAIDs , such as ibuprofen, help decrease swelling, pain, and fever. This medicine is available with or without a doctor's order. NSAIDs can cause stomach bleeding or kidney problems in certain people. If you take blood thinner medicine, always ask your healthcare provider if NSAIDs are safe for you. Always read the medicine label and follow directions. Steroid medicine  helps reduce swelling and pain. Surgery  may be needed to repair or replace a damaged joint. Manage arthritis:   Rest your painful joint so it can heal.  Your healthcare provider may recommend crutches or a walker if the affected joint is in a leg. Apply ice or heat to the joint. Both can help decrease swelling and pain. Ice may also help prevent tissue damage. Use an ice pack, or put crushed ice in a plastic bag. Cover it with a towel and place it on your joint for 15 to 20 minutes every hour or as directed. You can apply heat for 20 minutes every 2 hours. Heat treatment includes hot packs or heat lamps. Elevate your joint. Elevation helps reduce swelling and pain. Raise your joint above the level of your heart as often as you can. Prop your painful joint on pillows to keep it above your heart comfortably. Go to physical or occupational therapy as directed. A physical therapist can teach you exercises to improve flexibility and range of motion.  You may also be shown non-weight-bearing exercises that are safe for your joints, such as swimming. Exercise can help keep your joints flexible and reduce pain. An occupational therapist can help you learn to do your daily activities when your joints are stiff or sore. Maintain a healthy weight. Extra weight puts increased pressure on your joints. Ask your healthcare provider what you should weigh. If you need to lose weight, he can help you create a weight loss program. Weight loss can help reduce pain and increase your ability to do your activities. The amount of exercise you do may vary each day, depending on your symptoms. Wear flat or low-heeled shoes. This will help decrease pain and reduce pressure on your ankle, knee, and hip joints. Use support devices as directed. You may be given splints to wear on your hands to help your joints rest and to decrease inflammation. While you sleep, use a pillow that is firm enough to support your neck and head. Other equipment  that may help you move and prevent falls:  Orthotic shoes or insoles  help support your feet when you walk. Crutches, a cane, or a walker  may help decrease your risk for falling. They also decrease stress on affected joints. Devices to prevent falls  include raised toilet seats and bathtub bars to help you get up from sitting. Handrails can be placed in areas where you need balance and support. Follow up with your healthcare provider or rheumatologist as directed:  Write down your questions so you remember to ask them during your visits. © 2017 5 [facility] Information is for End User's use only and may not be sold, redistributed or otherwise used for commercial purposes. All illustrations and images included in CareNotes® are the copyrighted property of [employer].A[employer] Inc[employer] or Ian Kaur. The above information is an  only. It is not intended as medical advice for individual conditions or treatments.  Talk to your doctor, nurse or pharmacist before following any medical regimen to see if it is safe and effective for you.

## 2023-08-09 DIAGNOSIS — E78.5 HYPERLIPIDEMIA, UNSPECIFIED HYPERLIPIDEMIA TYPE: ICD-10-CM

## 2023-08-09 RX ORDER — SIMVASTATIN 40 MG
40 TABLET ORAL
Qty: 90 TABLET | Refills: 1 | Status: SHIPPED | OUTPATIENT
Start: 2023-08-09

## 2023-08-30 ENCOUNTER — TELEPHONE (OUTPATIENT)
Dept: PULMONOLOGY | Facility: CLINIC | Age: 73
End: 2023-08-30

## 2023-08-30 DIAGNOSIS — J84.9 ILD (INTERSTITIAL LUNG DISEASE) (HCC): Primary | ICD-10-CM

## 2023-08-30 RX ORDER — DOXYCYCLINE HYCLATE 100 MG/1
100 CAPSULE ORAL EVERY 12 HOURS SCHEDULED
Qty: 14 CAPSULE | Refills: 0 | Status: SHIPPED | OUTPATIENT
Start: 2023-08-30 | End: 2023-09-06

## 2023-08-30 RX ORDER — PREDNISONE 10 MG/1
TABLET ORAL
Qty: 30 TABLET | Refills: 0 | Status: SHIPPED | OUTPATIENT
Start: 2023-08-30

## 2023-08-30 NOTE — TELEPHONE ENCOUNTER
Stephan Herzog would like a return call regarding     What is the reason for the call/chief complaint? Spoke with patient, on Monday he started feeling congested and like his chest was filling up. Today he is constantly coughing and bringing up clear phlegm. He was wheezing so he used the albuterol neb solution and it helped. He is wondering if he should start prednisone or antibiotics before this gets worse. What additional symptoms are present or absent? SOB, no   Are they on O2? No   chest pain/tightness, no  wheezing, yes  Cough, yes  mucous production,yes. What color is it Clear  fevers/chills, no  weight gain, no  Swelling no  Pain no,    When did they start/how long have they been going on? Started Monday    What makes it better or worse? Used nebulizer and it did stop the wheezing    Have you been exposed to anyone that is sick? No    Have you been tested for COVID recently? no    Check current pulmonary medications Patient says he is only using the albuterol neb    Have they had any other treatment or testing for this problem elsewhere? no    Recent steroids? No    Recent Antibiotics? No     Last office visit? 6/1/23    Patient pharmacy?  [facility] [address] (in chart)

## 2023-08-30 NOTE — TELEPHONE ENCOUNTER
Patient called, he said his phone didn't ring but he had a missed phone call within the last hour from our office. He hopes we can try him again.  Please advise

## 2023-09-03 ENCOUNTER — PATIENT MESSAGE (OUTPATIENT)
Dept: PULMONOLOGY | Facility: CLINIC | Age: 73
End: 2023-09-03

## 2023-09-14 ENCOUNTER — TELEPHONE (OUTPATIENT)
Dept: PULMONOLOGY | Facility: CLINIC | Age: 73
End: 2023-09-14

## 2023-09-14 NOTE — TELEPHONE ENCOUNTER
Patient sent a letter stating that they had an x ray of the chest and a ct chest while they were at [address] [place] [address] sent them to be scanned into the chart

## 2023-09-18 ENCOUNTER — DOCUMENTATION (OUTPATIENT)
Dept: PULMONOLOGY | Facility: CLINIC | Age: 73
End: 2023-09-18

## 2023-09-27 NOTE — OP NOTE
2902- Patient arrived to PACU on 15L simple mask. Patient placed on monitor. Warm blankets ans bilateral SCDs provided. Patient drowsy but response to speech. Patient has 5 incisions on abdomen closed with sutures and glue. Incisions appear clean, dry, and intact. Patient arrived with de la paz and peripad in place. Secure device placed in PACU.    0930- Patient attempting to pull de la paz out. Patient attempting to get out of bed. Patient redirected. Warm blankets provided     0935- Patient A&Ox4 on room air. VSS. Patient denies pain    0940- Patient tolerating ice chips    0945- Patient repositioned for comfort. 2556- Patient c/o pain. PRN medication administered     0955- Patient resting with eyes closed    1000- Patient c/o pain. PRN medication administered     1010- 1200cc yellow urine emptied from de la paz    1015- Report called to [address]     8466- Patient c/o pain. PRN medication administered     1025- Patient express mild pain relief. Family updated via messaging     56-  Patient A&Ox4 on room air. VSS. Patient resting comfortably. No changes from previous incision assessment.       1035- Patient transported to unit Orthopedics RELEASE CARPAL TUNNEL, WRIST RESECTION VOLAR GANGLION CYST  Op Note      Pre-op Diagnosis:   Carpal tunnel syndrome of left wrist  Ganglion of left wrist, volar, recurrant    Post-op Diagnosis:  same    Procedure:  LEFT RELEASE CARPAL TUNNEL  LEFT WRIST RESECTION VOLAR GANGLION CYST  PLACEMENT OF SPLINT    Surgeon:  Surgeon(s):  Sam Muñoz MD     I was present for the entire procedure    Anesthesia:  General    Tourniquet Time:  28 min    Estimated Blood Loss:  minimal    Material sent to lab:  Ganglion to pathology      Complications:  None    Findings: Thickened transverse carpal ligament  Radial artery went directly through the ganglion cyst   Showed evidence of complete sclerosis with no blood flow  Indications:  A 78 y o  y/o male with pain and numbness in his left hand with exam and EMG findings consistent with carpal tunnel syndrome in addition to a recurrent volar wrist ganglion cyst  The symptoms were unresponsive to nonoperative management  Treatment options were discussed, and the patient wished to proceed with surgical intervention  Risks and benefits of surgery were discussed which included but were not limited to infection, recurrance, neurovascular damage, incomplete resolution of symptoms, wound healing problems, stiffness, need for further surgery, pillar pain  Informed consent was obtained  Procedure: The patient was met preoperatively and the left wrist was identified and signed  The patient was taken back to the operating room where a General LMA was performed  The arm was sterilely prepped and draped in the usual fashion  A timeout was held identifying the patient, side, site, antibiotics, and allergies  The patient received Ancef preoperatively  The arm was exsanguinated with an Esmarch and the tourniquet was inflated to 250 mm of mercury  I made a longitudinal incision 3 cm, along the radial border of the ring finger over the carpal tunnel   I carefully dissected down through the skin and soft tissue layer, down to the transverse carpal ligament  I then elevated carefully the soft tissue off the transverse carpal ligament, and once clear, under direct visualization, incised the ligament with a 15 blade  As soon as I was able I placed a freer underneath the ligament and proceeded to further release the ligament with a knife under direct visualization, with the Sonia Highman protecting the nerve  For the more proximal and distal aspects of the transverse carpal ligament, tenotomy scissors were used, being careful to spread above and below the ligament prior to cutting, keeping the tips under direct visualization  I then turned my attention to the volar wrist ganglion  The prior incision but extended proximally and distally to allow for visualization of the cyst   I dissected down through the soft tissue and easily identified the cyst   I carefully dissected around the cyst working my way circumferentially deeper and deeper  Was noted that the radial artery and heard the cyst distally in the middle of it  More proximally, it was found the exit  He did not have it standard appearance of a help the artery  Prior to surgery the risks of been examined and there was no blood flow coming through the radial artery and excellent blood flow clinic for the ulnar artery  Given this and the help of the radial artery, I elected to cut the radial artery  Inside the artery showed complete sclerosis with no open lumen  The cyst was followed down to its base but appear to be the radioscaphoid joint  It was cut as far down as possible in that area was thoroughly cauterized  Once I was comfortable that a complete release and excision of the ganglion cyst had been performed the tourniquet was let down  Adequate hemostasis was obtained  The incision was closed with 4-0 Nylon in a vertical mattress fashion  A bulky sterile dressing with a volar splint was placed        Disposition:  Patient tolerated the procedure well and was taken to recovery postoperatively      Plan:  - Patient will follow-up 7-10 days postoperatively  - Patient will be educated on elevating and icing the wrist  - Range of motion of the fingers is encouraged  - The dressing should remain on until follow-up      @[facility]@     Date: 1/24/2018  Time: 1:53 PM

## 2023-10-18 ENCOUNTER — OFFICE VISIT (OUTPATIENT)
Dept: OBGYN CLINIC | Facility: MEDICAL CENTER | Age: 73
End: 2023-10-18
Payer: MEDICARE

## 2023-10-18 VITALS
BODY MASS INDEX: 37.33 KG/M2 | HEART RATE: 89 BPM | DIASTOLIC BLOOD PRESSURE: 78 MMHG | WEIGHT: 252 LBS | HEIGHT: 69 IN | SYSTOLIC BLOOD PRESSURE: 121 MMHG

## 2023-10-18 DIAGNOSIS — M17.11 PRIMARY OSTEOARTHRITIS OF RIGHT KNEE: ICD-10-CM

## 2023-10-18 DIAGNOSIS — M25.561 ACUTE PAIN OF RIGHT KNEE: Primary | ICD-10-CM

## 2023-10-18 PROCEDURE — 99213 OFFICE O/P EST LOW 20 MIN: CPT | Performed by: EMERGENCY MEDICINE

## 2023-10-18 NOTE — PROGRESS NOTES
Assessment/Plan:    Diagnoses and all orders for this visit:    Acute pain of right knee    Primary osteoarthritis of right knee    Reviewed outside hospital records from [facility] regarding sepsis. He does have Pulmonary f/u. Given significant improvement of knee symptoms after CSI, doubt knee as source. Most likely source is Lung    Return if symptoms worsen or fail to improve. Subjective:   Patient ID: Milagros Mendoza is a 68 y.o. male. Amy Hernandez returns s/p CSI last evaluation 7/2023 with significant improvement. On a side note patient states he was admitted to [facility] for sepsis in September while recovering from Pulmonary infection        Review of Systems    The following portions of the patient's chart were reviewed and updated as appropriate:    Allergy:    Allergies   Allergen Reactions    Pollen Extract Allergic Rhinitis       Medications:    Current Outpatient Medications:     albuterol (PROVENTIL HFA,VENTOLIN HFA) 90 mcg/act inhaler, Inhale 2 puffs every 6 (six) hours as needed for wheezing, Disp: 18 g, Rfl: 3    Cholecalciferol (VITAMIN D PO), Take by mouth, Disp: , Rfl:     Coenzyme Q10 (CO Q 10 PO), Take 200 mg by mouth, Disp: , Rfl:     esomeprazole (NexIUM) 40 MG capsule, Take 1 capsule (40 mg total) by mouth daily, Disp: 180 capsule, Rfl: 1    levocetirizine (XYZAL) 5 MG tablet, Take 5 mg by mouth every evening, Disp: , Rfl:     simvastatin (ZOCOR) 40 mg tablet, Take 1 tablet (40 mg total) by mouth daily at bedtime, Disp: 90 tablet, Rfl: 1    predniSONE 10 mg tablet, 4 tabs x 3 days decrease by 1 tablet every third day (Patient not taking: Reported on 10/18/2023), Disp: 30 tablet, Rfl: 0    Patient Active Problem List   Diagnosis    Carpal tunnel syndrome of left wrist    Tendinitis of left rotator cuff    Biceps tendonitis on left    Benign colon polyp    Hyperlipidemia    Peyronie's disease    GERD without esophagitis    Former smoker -quit 2014 Frequent urination    Other urethral stricture, male, unspecified site    Centrilobular emphysema (HCC)    ILD (interstitial lung disease) (HCC)    Obesity, morbid (HCC)    Allergic rhinitis    Lung nodule       Objective:  /78   Pulse 89   Ht 5' 9" (1.753 m)   Wt 114 kg (252 lb)   BMI 37.21 kg/m²     Right Knee Exam     Other   Erythema: absent            Physical Exam      Neurologic Exam    Procedures    I have personally reviewed the written report of the pertinent studies.              Past Medical History:   Diagnosis Date    AC joint arthropathy     right    Allergic 2018    Arthritis 2020    BPH (benign prostatic hyperplasia)     Carpal tunnel syndrome     bilateral    Chronic prostatitis     last assessed: 3/1/2014    Chronic UTI (urinary tract infection)     Colon polyp     Diverticulitis     twice    Ganglion     last assessed: 10/7/2013    GERD (gastroesophageal reflux disease)     GERD without esophagitis     Hyperlipidemia     Impingement syndrome of right shoulder     last assessed: 3/22/2016    Left inguinal hernia     description: repaired by Dr. Brianna Woody     Peyronie's disease     had surgery    Pneumonia 2019    Pneumonia of left upper lobe due to infectious organism     last assessed: 2/8/2016    Sexual dysfunction     Stomach disorder     Urinary tract infection 2020    Wears glasses        Past Surgical History:   Procedure Laterality Date    COLONOSCOPY      COLONOSCOPY      CYSTOSCOPY      Diagnostic last assessed: 6/12/2014    CYSTOSTOMY      urethral stricture    HERNIA REPAIR      mesh left inguinal    NEUROPLASTY / TRANSPOSITION MEDIAN NERVE AT CARPAL TUNNEL      RI CYSTO INSERTION TRANSPROSTATIC IMPLANT SINGLE N/A 6/23/2020    Procedure: CYSTOSCOPY, URETHRAL DILATION,  WITH INSERTION Chente Bari;  Surgeon: Rod Issa MD;  Location[facility];  Service: Urology    RI ENDOSCOPIC PLANTAR FASCIOTOMY Left 11/18/2016    Procedure: PLANTAR FASCIOTOMY;  Surgeon: Farrukh Yadav DPM; Location: [facility];  Service: Podiatry    ID EXCISION GANGLION WRIST DORSAL/VOLAR PRIMARY Left 2018    Procedure: WRIST RESECTION VOLAR GANGLION CYST;  Surgeon: Danika Puckett MD;  Location: [facility];  Service: Orthopedics    ID NEUROPLASTY &/TRANSPOS MEDIAN NRV CARPAL Vanice Latus Right 2016    Procedure: CARPAL TUNNEL RELEASE ;  Surgeon: Danika Puckett MD;  [facility];  Service: Orthopedics    ID NEUROPLASTY &/TRANSPOS MEDIAN NRV CARPAL Vanice Latus Left 2018    Procedure: RELEASE CARPAL TUNNEL;  Surgeon: Danika Puckett MD;  [facility];  Service: Orthopedics       Social History     Socioeconomic History    Marital status:      Spouse name: Not on file    Number of children: 1    Years of education: Not on file    Highest education level: Not on file   Occupational History    Occupation: Working full time    Tobacco Use    Smoking status: Former     Packs/day: 1.00     Years: 40.00     Total pack years: 40.00     Types: Cigarettes     Start date: 9/15/1969     Quit date: 2014     Years since quittin.8    Smokeless tobacco: Former   Vaping Use    Vaping Use: Never used   Substance and Sexual Activity    Alcohol use:  Yes     Alcohol/week: 3.0 standard drinks of alcohol     Types: 3 Standard drinks or equivalent per week     Comment: social and denies alcohol use causing problems     Drug use: No    Sexual activity: Not Currently     Partners: Female   Other Topics Concern    Not on file   Social History Narrative    Not on file     Social Determinants of Health     Financial Resource Strain: Low Risk  (10/8/2020)    Overall Financial Resource Strain (CARDIA)     Difficulty of Paying Living Expenses: Not hard at all   Food Insecurity: No Food Insecurity (10/8/2020)    Hunger Vital Sign     Worried About Running Out of Food in the Last Year: Never true     [address] in the Last Year: Never true   Transportation Needs: No Transportation Needs (10/8/2020)    Eleanor Galindo Transportation     Lack of Transportation (Medical): No     Lack of Transportation (Non-Medical):  No   Physical Activity: Inactive (6/29/2020)    Exercise Vital Sign     Days of Exercise per Week: 0 days     Minutes of Exercise per Session: 0 min   Stress: No Stress Concern Present (6/29/2020)    [facility]     Feeling of Stress : Not at all   Social Connections: Socially Isolated (6/29/2020)    Social Connection and Isolation Panel [NHANES]     Frequency of Communication with Friends and Family: Twice a week     Frequency of Social Gatherings with Friends and Family: Once a week     Attends [employer] Services: Never     Active Member of Clubs or Organizations: No     Attends Club or Organization Meetings: Never     Marital Status:    Intimate Partner Violence: Not At Risk (7/18/2022)    Humiliation, Afraid, Rape, and Kick questionnaire     Fear of Current or Ex-Partner: No     Emotionally Abused: No     Physically Abused: No     Sexually Abused: No   Housing Stability: Not on file       Family History   Problem Relation Age of Onset    Cancer Father         bladder    Colon polyps Neg Hx     Colon cancer Neg Hx

## 2023-11-27 ENCOUNTER — OFFICE VISIT (OUTPATIENT)
Dept: GASTROENTEROLOGY | Facility: CLINIC | Age: 73
End: 2023-11-27
Payer: MEDICARE

## 2023-11-27 VITALS
SYSTOLIC BLOOD PRESSURE: 122 MMHG | HEIGHT: 69 IN | BODY MASS INDEX: 38.21 KG/M2 | WEIGHT: 258 LBS | DIASTOLIC BLOOD PRESSURE: 78 MMHG

## 2023-11-27 DIAGNOSIS — K59.00 CONSTIPATION, UNSPECIFIED CONSTIPATION TYPE: Primary | ICD-10-CM

## 2023-11-27 DIAGNOSIS — K21.9 GASTROESOPHAGEAL REFLUX DISEASE, UNSPECIFIED WHETHER ESOPHAGITIS PRESENT: ICD-10-CM

## 2023-11-27 DIAGNOSIS — Z86.010 HISTORY OF COLONIC POLYPS: ICD-10-CM

## 2023-11-27 PROBLEM — Z86.0100 HISTORY OF COLONIC POLYPS: Status: ACTIVE | Noted: 2023-11-27

## 2023-11-27 PROCEDURE — 99204 OFFICE O/P NEW MOD 45 MIN: CPT | Performed by: INTERNAL MEDICINE

## 2023-11-27 RX ORDER — LINACLOTIDE 145 UG/1
145 CAPSULE, GELATIN COATED ORAL DAILY
Qty: 30 CAPSULE | Refills: 2 | Status: SHIPPED | OUTPATIENT
Start: 2023-11-27 | End: 2024-02-25

## 2023-11-27 RX ORDER — POLYETHYLENE GLYCOL 3350 17 G/17G
17 POWDER, FOR SOLUTION ORAL DAILY
Qty: 850 G | Refills: 3 | Status: SHIPPED | OUTPATIENT
Start: 2023-11-27

## 2023-11-27 NOTE — PROGRESS NOTES
[address] [place] [address] [facility] - Outpatient Consultation  Varghese Miguel 68 y.o. male MRN: [id number]  Encounter: [id number]    ASSESSMENT AND PLAN:      1. Constipation, unspecified constipation type  -     linaCLOtide (Linzess) 145 MCG CAPS; Take 1 capsule (145 mcg total) by mouth daily  -     polyethylene glycol (GLYCOLAX) 17 GM/SCOOP powder; Take 17 g by mouth daily    2. History of colonic polyps    3. Gastroesophageal reflux disease, unspecified whether esophagitis present      Chronic idiopathic constipation. I will try Linzess 145 mcg once daily and informed patient about possible dose titration depending on how he would respond with this middle dose. We will get insurance approval first for this type of medication. In the meantime, he can take MiraLAX 17 g once daily and titrate as needed to a regular bowel movement. History of colonic polyps. He last had a colonoscopy in 2021, recall recommended in 5 years. GERD. He can continue conservative measures of exercise, dietary changes, avoidance of triggers, and avoidance of late-night meals along with his Nexium 40 mg once daily. Patient will follow-up in about 3 months. Prior Authorization for Linzess (linaclotide)  We will prescribe Linzess 145mcg daily. The patient has tried increased fiber intake, and failed stimulant laxative of bisacodyl and senna, and osmotic laxatives of miralax. I have spent 45  minutes today on the date of visit which was spent on one or more of the following: obtaining and reviewing separately obtained history, performing a medically appropriate examination and evaluation, counseling and educating the patient, ordering medications, tests, and procedures, documenting clinical information in the electronic or other health record, and care coordination. Chief Complaint   Patient presents with   • Constipation, thinks he has IBS     Referred by Dr. Eh Garcia         HPI:   Varghese Miguel is a 68 y.o. year old male with a significant past medical history of GERD and history of polyps who is presenting for consultation from Dr. Jose Rueda regarding constipation. He had a colonoscopy in 01/2021 with 2 sub centimeter polyps which were tubular adenomas also with diverticulosis of the sigmoid colon and grade 1 internal hemorrhoids; recommended repeat in 5 years. He does have a history of elevated LFTs most recently in 11/2022 with an AST of 71 and ALT of 86, alkaline phosphatase of 96 and total bilirubin of 0.75. He had a recent imaging at an outside facility in 09/2023. The patient has been struggling with constipation over the past several months. He can not void himself and is constantly constipated. He experiences delayed effectiveness when using stool softeners or laxatives. Without any medications, he would have infrequent bowel movements, with small releases occurring 2 to 3 times a week. He associated the constipation with excessive gas. The gas improves after a bowel movement, but unless he completely empties his bowels, it returns. He denies any fevers, chills, nausea, vomiting, or dysphagia. His heartburn is controlled on Nexium. He denies any hematochezia, he had polyps removed once and stated that his diet is good. He has tried MiraLAX in the past and does not see much of a difference with it. He takes 2 or 3 Dulcolax at a time and has not taken MiraLAX for an extended amount of time. Patient also inquired about Karthik Iliana and believes that his current condition aligns with the use of this medication.       Historical Information   Past Medical History:   Diagnosis Date   • AC joint arthropathy     right   • Allergic 2018   • Arthritis 2020   • BPH (benign prostatic hyperplasia)    • Carpal tunnel syndrome     bilateral   • Chronic prostatitis     last assessed: 3/1/2014   • Chronic UTI (urinary tract infection)    • Colon polyp    • Diverticulitis     twice   • Ganglion     last assessed: 10/7/2013   • GERD (gastroesophageal reflux disease)    • GERD without esophagitis    • Hyperlipidemia    • Impingement syndrome of right shoulder     last assessed: 3/22/2016   • Irritable bowel syndrome July 2022   • Left inguinal hernia     description: repaired by Dr. Odilia Diaz    • Peyronie's disease     had surgery   • Pneumonia 2019   • Pneumonia of left upper lobe due to infectious organism     last assessed: 2/8/2016   • Sexual dysfunction    • Stomach disorder    • Urinary tract infection 2020   • Wears glasses      Past Surgical History:   Procedure Laterality Date   • ABDOMINAL SURGERY  2010   • COLONOSCOPY     • COLONOSCOPY     • CYSTOSCOPY      Diagnostic last assessed: 6/12/2014   • CYSTOSTOMY      urethral stricture   • HERNIA REPAIR      mesh left inguinal   • NEUROPLASTY / TRANSPOSITION MEDIAN NERVE AT CARPAL TUNNEL     • KS CYSTO INSERTION TRANSPROSTATIC IMPLANT SINGLE N/A 06/23/2020    Procedure: CYSTOSCOPY, URETHRAL DILATION,  WITH INSERTION UROLIFT;  Surgeon: Bill Moore MD;  [facility];  Service: Urology   • KS ENDOSCOPIC PLANTAR FASCIOTOMY Left 11/18/2016    Procedure: PLANTAR FASCIOTOMY;  Surgeon: Millie Bethea DPM;  Location: [facility];  Service: Podiatry   • KS EXCISION GANGLION WRIST DORSAL/VOLAR PRIMARY Left 01/24/2018    Procedure: WRIST RESECTION VOLAR GANGLION CYST;  Surgeon: Marybel Yu MD;  Location: [facility];  Service: Orthopedics   • KS NEUROPLASTY &/TRANSPOS MEDIAN NRV CARPAL Melany Justin Right 08/24/2016    Procedure: CARPAL TUNNEL RELEASE ;  Surgeon: Mayrbel Yu MD;  [facility];  Service: Orthopedics   • KS NEUROPLASTY &/TRANSPOS MEDIAN NRV CARPAL Melany Justin Left 01/24/2018    Procedure: RELEASE CARPAL TUNNEL;  Surgeon: Marybel Yu MD;  Location[facility];  Service: Orthopedics     Social History     Substance and Sexual Activity   Alcohol Use Yes   • Alcohol/week: 3.0 standard drinks of alcohol   • Types: 3 Standard drinks or equivalent per week    Comment: social and denies alcohol use causing problems      Social History     Substance and Sexual Activity   Drug Use No     Social History     Tobacco Use   Smoking Status Former   • Packs/day: 1.00   • Years: 40.00   • Total pack years: 40.00   • Types: Cigarettes   • Start date: 9/15/1969   • Quit date: 2014   • Years since quittin.9   Smokeless Tobacco Former     Family History   Problem Relation Age of Onset   • Cancer Father         bladder   • Colon polyps Neg Hx    • Colon cancer Neg Hx        Meds/Allergies     Current Outpatient Medications:   •  albuterol (PROVENTIL HFA,VENTOLIN HFA) 90 mcg/act inhaler  •  Cholecalciferol (VITAMIN D PO)  •  Coenzyme Q10 (CO Q 10 PO)  •  esomeprazole (NexIUM) 40 MG capsule  •  levocetirizine (XYZAL) 5 MG tablet  •  linaCLOtide (Linzess) 145 MCG CAPS  •  polyethylene glycol (GLYCOLAX) 17 GM/SCOOP powder  •  simvastatin (ZOCOR) 40 mg tablet  •  predniSONE 10 mg tablet    Allergies   Allergen Reactions   • Pollen Extract Allergic Rhinitis       PHYSICAL EXAM:    Blood pressure 122/78, height 5' 9" (1.753 m), weight 117 kg (258 lb). Body mass index is 38.1 kg/m². General Appearance: No apparent distress, cooperative, alert. Eyes: Anicteric. Gastrointestinal: Soft, non-tender, non-distended; normal bowel sounds; no masses, no organomegaly. Positive for constipation associated with gas. Rectal: Deferred. Musculoskeletal: No edema. Skin: No jaundice. Lab Results:     Testing  He last had a colonoscopy in 2021 with 2 sub centimeter polyps, which were tubular adenomas, also with diverticulosis of the sigmoid colon and grade 1 internal hemorrhoids. Recommended repeat in 5 years. I have personally reviewed results with the patient.       Lab Results   Component Value Date    WBC 10.36 (H) 2023    WBC 9.12 2022    WBC 7.57 2021    HGB 15.3 2023    HGB 15.1 2022    HGB 14.8 2021    MCV 98 2023     2023     2022     03/03/2021    INR 0.97 06/17/2014     Lab Results   Component Value Date     03/11/2015    K 4.2 11/30/2022     11/30/2022    CO2 29 11/30/2022    ANIONGAP 3 (L) 03/11/2015    BUN 14 11/30/2022    CREATININE 0.94 11/30/2022    GLUCOSE 105 03/11/2015    GLUF 100 (H) 11/30/2022    CALCIUM 9.5 11/30/2022    CORRECTEDCA 10.0 11/30/2022    AST 71 (H) 11/30/2022    AST 29 03/03/2021    AST 41 10/03/2019    ALT 86 (H) 11/30/2022    ALT 51 03/03/2021    ALT 54 10/03/2019    ALKPHOS 96 11/30/2022    ALKPHOS 74 03/03/2021    ALKPHOS 67 10/03/2019    PROT 7.8 11/23/2015    BILITOT 0.82 11/23/2015    BILITOT 0.8 03/11/2015    BILITOT 1.00 06/18/2014    EGFR 81 11/30/2022     No results found for: "IRON", "TIBC", "FERRITIN"  Lab Results   Component Value Date    LIPASE 116 03/11/2015       Radiology Results:   No results found. Transcribed for Joan Abreu MD, by [employer] on 11/27/23 at 3:53 PM. Powered by [employer].

## 2023-12-20 ENCOUNTER — OFFICE VISIT (OUTPATIENT)
Dept: PULMONOLOGY | Facility: CLINIC | Age: 73
End: 2023-12-20
Payer: MEDICARE

## 2023-12-20 VITALS
DIASTOLIC BLOOD PRESSURE: 82 MMHG | TEMPERATURE: 97.4 F | HEIGHT: 69 IN | BODY MASS INDEX: 38.06 KG/M2 | WEIGHT: 257 LBS | OXYGEN SATURATION: 97 % | SYSTOLIC BLOOD PRESSURE: 128 MMHG | HEART RATE: 90 BPM

## 2023-12-20 DIAGNOSIS — J43.2 CENTRILOBULAR EMPHYSEMA (HCC): Primary | ICD-10-CM

## 2023-12-20 DIAGNOSIS — R91.1 LUNG NODULE: ICD-10-CM

## 2023-12-20 DIAGNOSIS — J84.9 ILD (INTERSTITIAL LUNG DISEASE) (HCC): ICD-10-CM

## 2023-12-20 DIAGNOSIS — Z16.24 MULTIPLE DRUG RESISTANT ORGANISM (MDRO) CULTURE POSITIVE: ICD-10-CM

## 2023-12-20 DIAGNOSIS — Z87.891 FORMER SMOKER: ICD-10-CM

## 2023-12-20 PROBLEM — G56.02 CARPAL TUNNEL SYNDROME OF LEFT WRIST: Status: RESOLVED | Noted: 2018-01-24 | Resolved: 2023-12-20

## 2023-12-20 PROBLEM — N48.6 PEYRONIE'S DISEASE: Status: RESOLVED | Noted: 2019-05-14 | Resolved: 2023-12-20

## 2023-12-20 PROCEDURE — 99214 OFFICE O/P EST MOD 30 MIN: CPT | Performed by: INTERNAL MEDICINE

## 2023-12-20 NOTE — ASSESSMENT & PLAN NOTE
Continues to meet lung cancer screening criteria  44-pack-year history and quit in 2014  Needs diagnostic CT in September 2024 for interstitial lung disease however.  Not appropriate for screening criteria CT

## 2023-12-20 NOTE — ASSESSMENT & PLAN NOTE
Imaging studies from a pulmonary perspective have been stable  Minimal pulmonary symptoms as well  Last imaging was September 2023 at [facility].  He was hospitalized for urinary sepsis at that time  He has multiple years of stability, recommended 1 year follow-up scan

## 2023-12-20 NOTE — PROGRESS NOTES
Progress note - Pulmonary Medicine   Heath Dumas 73 y.o. male MRN: [id number]       Impression & Plan:     ILD (interstitial lung disease) (HCC)  Imaging studies from a pulmonary perspective have been stable  Minimal pulmonary symptoms as well  Last imaging was September 2023 at [facility].  He was hospitalized for urinary sepsis at that time  He has multiple years of stability, recommended 1 year follow-up scan    Centrilobular emphysema (HCC)  From an emphysema perspective  Has a rescue albuterol inhaler but has very rare use  Has some morning symptomatology which is related to allergies and nasal drip    Lung nodule  6 mm nodule has demonstrated long-term stability, benign    Former smoker -quit 2014  Continues to meet lung cancer screening criteria  44-pack-year history and quit in 2014  Needs diagnostic CT in September 2024 for interstitial lung disease however.  Not appropriate for screening criteria CT    Multiple drug resistant organism (MDRO) culture positive  Data received from [facility] suggest patient had ESBL organism in the urine  Actual culture has not been available and has been requested.  Blood culture and urinary culture results requested from [facility].  Discussed with the patient and he may wish to be evaluated by urology given his urologic history and recent episode of sepsis related to urinary tract infection    Return in about 9 months (around 9/20/2024).      ______________________________________________________________________    HPI:    Heath Dumas presents today for follow-up of interstitial lung disease and emphysema.  He is a former smoker with a 44-pack-year history and quit in 2014.  He has not had any new pulmonary symptoms.  He has a bit of a morning cough with phlegm production which he attributes to allergies and nasal drip.  Symptoms resolved pretty quickly in the morning after taking his antihistamine.  No chest pain or wheezing.  He has an albuterol  inhaler but rarely uses this.  He has not required other pulmonary medications.    From an interstitial lung disease standpoint, symptoms also remain stable.  He does not report significant shortness of breath, chest tightness, or other pulmonary symptomatology.  Imaging has also been stable for many imaging studies.    Recently however, he was hospitalized at [facility] for sepsis.  It was felt to be urinary source and ultimately urine reportedly grew a ESBL organism.  Culture data was not available however electronically and has been requested.  This was not resulted at the time of hospital discharge.  He did have significant clinical improvement however with the antibiotic course in the hospital.  He does have a previous urologic history but since his UroLift procedure, he does not report any urinary problems    Current Medications:    Current Outpatient Medications:     albuterol (PROVENTIL HFA,VENTOLIN HFA) 90 mcg/act inhaler, Inhale 2 puffs every 6 (six) hours as needed for wheezing, Disp: 18 g, Rfl: 3    Cholecalciferol (VITAMIN D PO), Take by mouth, Disp: , Rfl:     Coenzyme Q10 (CO Q 10 PO), Take 200 mg by mouth, Disp: , Rfl:     esomeprazole (NexIUM) 40 MG capsule, Take 1 capsule (40 mg total) by mouth daily, Disp: 180 capsule, Rfl: 1    levocetirizine (XYZAL) 5 MG tablet, Take 5 mg by mouth every evening, Disp: , Rfl:     linaCLOtide (Linzess) 145 MCG CAPS, Take 1 capsule (145 mcg total) by mouth daily, Disp: 30 capsule, Rfl: 2    polyethylene glycol (GLYCOLAX) 17 GM/SCOOP powder, Take 17 g by mouth daily, Disp: 850 g, Rfl: 3    simvastatin (ZOCOR) 40 mg tablet, Take 1 tablet (40 mg total) by mouth daily at bedtime, Disp: 90 tablet, Rfl: 1    Review of Systems:    Aside from what is mentioned in the HPI, the review of systems is otherwise negative    Past medical history, surgical history, and family history were reviewed and updated as appropriate    Social history updates:  Social History     Tobacco  "Use   Smoking Status Former    Current packs/day: 0.00    Average packs/day: 1 pack/day for 44.3 years (44.3 ttl pk-yrs)    Types: Cigarettes    Start date: 9/15/1969    Quit date: 2014    Years since quittin.9   Smokeless Tobacco Former    Quit date: 2023       PhysicalExamination:  Vitals:   /82 (BP Location: Left arm, Patient Position: Sitting, Cuff Size: Large)   Pulse 90   Temp (!) 97.4 °F (36.3 °C) (Tympanic)   Ht 5' 9\" (1.753 m)   Wt 117 kg (257 lb)   SpO2 97%   BMI 37.95 kg/m²     Gen: Overweight.  Comfortable on room air.  No conversational dyspnea  HEENT:  Conjugate gaze.  sclerae anicteric.  Oropharynx moist  Neck: Trachea is midline. No JVD. No adenopathy  Chest: Excursion symmetric with clear breath sounds.  No wheezes or crackles  Cardiac: Slightly distant heart tones, regular. no murmur  Abdomen: Obese, benign  Extremities: No edema  Neuro:  Normal speech and mentation    Diagnostic Data:  Labs:  I personally reviewed the most recent laboratory data pertinent to today's visit    Lab Results   Component Value Date    WBC 10.36 (H) 2023    HGB 15.3 2023    HCT 48.1 2023    MCV 98 2023     2023     Lab Results   Component Value Date    SODIUM 137 2022    K 4.2 2022    CO2 29 2022     2022    BUN 14 2022    CREATININE 0.94 2022    GLUCOSE 105 2015    CALCIUM 9.5 2022       PFT results:  The most recent pulmonary function tests were reviewed.  PFTs from  only demonstrate mild reduction in diffusion capacity.  Spirometry and lung volumes are normal    Imaging:  I personally reviewed the images on the PAC system pertinent to today's visit  Most recent CT imaging in our network is from May and this was compared with the outside study from [facility] in 2023.  This does show stable emphysema and subpleural fibrotic changes.  Fibrotic changes have demonstrated long-term " stability    Abdiel Alvarez MD

## 2023-12-20 NOTE — ASSESSMENT & PLAN NOTE
Data received from [facility] suggest patient had ESBL organism in the urine  Actual culture has not been available and has been requested.  Blood culture and urinary culture results requested from [facility].  Discussed with the patient and he may wish to be evaluated by urology given his urologic history and recent episode of sepsis related to urinary tract infection

## 2023-12-20 NOTE — ASSESSMENT & PLAN NOTE
From an emphysema perspective  Has a rescue albuterol inhaler but has very rare use  Has some morning symptomatology which is related to allergies and nasal drip

## 2023-12-23 ENCOUNTER — PATIENT MESSAGE (OUTPATIENT)
Dept: UROLOGY | Facility: AMBULATORY SURGERY CENTER | Age: 73
End: 2023-12-23

## 2023-12-27 ENCOUNTER — TELEPHONE (OUTPATIENT)
Age: 73
End: 2023-12-27

## 2023-12-27 NOTE — TELEPHONE ENCOUNTER
Patient has been contacted and we agreed on the below appointment and location:    Date: 2/5/2024     Arrival Time: 10:45 AM     Visit Type: FOLLOW UP [facility] [[id number]]     Provider: Kristie Hightower PA-C Department: [facility] [place]

## 2023-12-27 NOTE — TELEPHONE ENCOUNTER
Are there any trusted websites or informational packets on how to prevent ecoli infection that we can share with the client?     I did fine this information, is this approved to be shared with the client?     https://www.health.[place].gov/topics/disease/Pages/Foodborne-Illness.aspx    Please advise.

## 2023-12-27 NOTE — TELEPHONE ENCOUNTER
----- Message from Heath Dumas sent at 12/27/2023 12:34 PM EST -----  Regarding: Ecoli bacterial infection to Sepsis  Contact: [phone]  I was given massive doses of antibiotics by IV the whole time I was at [facility]. I currently have no symptoms. What concerns me is how I could get Ecoli bacteria in my urinary tract in the first place.  What can be done to insure that I never have this potential life threatening Bacteria again. Please have someone call for a visit and any subsequent tests  that should be done. Thanks

## 2024-02-01 NOTE — PROGRESS NOTES
2/5/2024      Chief Complaint   Patient presents with    Benign Prostatic Hypertrophy       Assessment and Plan    73 y.o. male managed by AP team (last seen July 2021)     1. BPH  2. Urethral Stricture     S/p Urolift and urethral dilation June 2020 by Dr. Silveira   No repeat occurrence of stricture   AUA score 6 today   Patient is overall content with urinary pattern   Urine dip in office shows small leuks, no blood or nitrites    Urosepsis in September - send urine for UA and culture per patients request  Father has history of bladder cancer   PVR 22 mL   Most recent PSA from 6/1/2022 was 0.6  Follow PSA trend depicted below    Follow up in  6 months with repeat PSA and urine dip in office   Consider PRABHAKAR at this visit if patient is agreeable     Lab Results   Component Value Date    PSA 0.6 06/01/2022    PSA 0.6 03/03/2021    PSA 1.0 11/09/2020       History of Present Illness  Heath Dumas is a 73 y.o. male here for annual follow-up evaluation of BPH and history of urethral stricture.  S/p UroLift and urethral dilation in June 2020 completed by Dr. Silveira.  Patient denies further episodes of urethral stricture at this time.  PVR today 22 mL. AUA score 6.  Overall patient is content with his urinary pattern.  Patient does not currently take any medication for his urinary pattern.  Patient denies dysuria, hematuria, urinary frequency/urgency, incomplete emptying, urinary incontinence, or pain in the bladder/bilateral flanks.     Patient reports that in September 2023 he was admitted to the hospital and found to have urosepsis from an E. coli UTI.  He was admitted for 2 days stating that he did not have any symptoms of urinary tract infection prior to being admitted.  He was urinating completely normally and then developed fever/chills and rigors.  Urine dip today shows small leukocytes, no other signs of infection or blood.  Patient requesting that urine is still sent out for urinalysis and culture to ensure  that there is no signs of infection.  Patient would feel comfortable returning in 6 months for repeat urine dip to avoid further episodes of urosepsis.     Patient's most recent PSA from 6/1/2022 was 0.6. Denies history of elevated PSA.  PRABHAKAR today in office deferred.  Patient denies family history of prostate cancer. Father did have bladder cancer. Repeat PSA within prior to 6 month follow up to discuss results.      Review of Systems   Constitutional:  Negative for activity change and fever.   Respiratory:  Negative for apnea, cough and shortness of breath.    Cardiovascular:  Negative for chest pain and leg swelling.   Gastrointestinal:  Negative for abdominal distention, abdominal pain, constipation, diarrhea, nausea and vomiting.   Genitourinary:  Negative for decreased urine volume, difficulty urinating, dysuria, flank pain, frequency, hematuria, penile pain, testicular pain and urgency.   Neurological:  Negative for dizziness and headaches.   Psychiatric/Behavioral: Negative.         AUA SYMPTOM SCORE      Flowsheet Row Most Recent Value   AUA SYMPTOM SCORE    How often have you had a sensation of not emptying your bladder completely after you finished urinating? 1   How often have you had to urinate again less than two hours after you finished urinating? 1   How often have you found you stopped and started again several times when you urinate? 1   How often have you found it difficult to postpone urination? 1   How often have you had a weak urinary stream? 0   How often have you had to push or strain to begin urination? 1   How many times did you most typically get up to urinate from the time you went to bed at night until the time you got up in the morning? 1   Quality of Life: If you were to spend the rest of your life with your urinary condition just the way it is now, how would you feel about that? 0   AUA SYMPTOM SCORE 6              Vitals  Vitals:    02/05/24 1109   BP: 132/88   Pulse: 59   SpO2: 92%  "  Weight: 116 kg (256 lb)   Height: 5' 9\" (1.753 m)       Physical Exam  Constitutional:       Appearance: Normal appearance.   HENT:      Head: Normocephalic and atraumatic.      Mouth/Throat:      Pharynx: Oropharynx is clear.   Eyes:      Extraocular Movements: Extraocular movements intact.      Conjunctiva/sclera: Conjunctivae normal.   Cardiovascular:      Rate and Rhythm: Normal rate.   Pulmonary:      Effort: Pulmonary effort is normal.   Abdominal:      General: Abdomen is flat. There is no distension.      Palpations: Abdomen is soft.      Tenderness: There is no abdominal tenderness.   Genitourinary:     Comments: PRABHAKAR deferred, past DREs normal  Musculoskeletal:         General: Normal range of motion.      Cervical back: Normal range of motion.   Skin:     General: Skin is warm and dry.   Neurological:      General: No focal deficit present.      Mental Status: He is alert and oriented to person, place, and time.   Psychiatric:         Mood and Affect: Mood normal.         Behavior: Behavior normal.           Past History  Past Medical History:   Diagnosis Date    AC joint arthropathy     right    Allergic 2018    Arthritis 2020    BPH (benign prostatic hyperplasia)     Carpal tunnel syndrome     bilateral    Chronic prostatitis     last assessed: 3/1/2014    Chronic UTI (urinary tract infection)     Colon polyp     Diverticulitis     twice    Ganglion     last assessed: 10/7/2013    GERD (gastroesophageal reflux disease)     GERD without esophagitis     Hyperlipidemia     Impingement syndrome of right shoulder     last assessed: 3/22/2016    Irritable bowel syndrome July 2022    Left inguinal hernia     description: repaired by Dr. 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    Pneumonia 2019    Pneumonia of left upper lobe due to infectious organism     last assessed: 2/8/2016    Sexual dysfunction     Stomach disorder     Urinary tract infection 2020    Wears glasses      Social History "     Socioeconomic History    Marital status:      Spouse name: Not on file    Number of children: 1    Years of education: Not on file    Highest education level: Not on file   Occupational History    Occupation: Working full time    Tobacco Use    Smoking status: Former     Current packs/day: 0.00     Average packs/day: 1 pack/day for 44.3 years (44.3 ttl pk-yrs)     Types: Cigarettes     Start date: 9/15/1969     Quit date: 1/1/2014     Years since quitting: 10.1    Smokeless tobacco: Former     Quit date: 1/1/2023   Vaping Use    Vaping status: Never Used   Substance and Sexual Activity    Alcohol use: Yes     Alcohol/week: 3.0 standard drinks of alcohol     Types: 3 Standard drinks or equivalent per week     Comment: social and denies alcohol use causing problems     Drug use: No    Sexual activity: Not Currently     Partners: Male   Other Topics Concern    Not on file   Social History Narrative    Not on file     Social Determinants of Health     Financial Resource Strain: Low Risk  (10/8/2020)    Overall Financial Resource Strain (CARDIA)     Difficulty of Paying Living Expenses: Not hard at all   Food Insecurity: No Food Insecurity (10/8/2020)    Hunger Vital Sign     Worried About Running Out of Food in the Last Year: Never true     Ran Out of Food in the Last Year: Never true   Transportation Needs: No Transportation Needs (10/8/2020)    PRAPARE - Transportation     Lack of Transportation (Medical): No     Lack of Transportation (Non-Medical): No   Physical Activity: Inactive (6/29/2020)    Exercise Vital Sign     Days of Exercise per Week: 0 days     Minutes of Exercise per Session: 0 min   Stress: No Stress Concern Present (6/29/2020)    [place] [employer] - Occupational Stress Questionnaire     Feeling of Stress : Not at all   Social Connections: Socially Isolated (6/29/2020)    Social Connection and Isolation Panel [NHANES]     Frequency of Communication with Friends and  Family: Twice a week     Frequency of Social Gatherings with Friends and Family: Once a week     Attends Hindu Services: Never     Active Member of Clubs or Organizations: No     Attends Club or Organization Meetings: Never     Marital Status:    Intimate Partner Violence: Not At Risk (7/18/2022)    Humiliation, Afraid, Rape, and Kick questionnaire     Fear of Current or Ex-Partner: No     Emotionally Abused: No     Physically Abused: No     Sexually Abused: No   Housing Stability: Not on file     Social History     Tobacco Use   Smoking Status Former    Current packs/day: 0.00    Average packs/day: 1 pack/day for 44.3 years (44.3 ttl pk-yrs)    Types: Cigarettes    Start date: 9/15/1969    Quit date: 1/1/2014    Years since quitting: 10.1   Smokeless Tobacco Former    Quit date: 1/1/2023     Family History   Problem Relation Age of Onset    Cancer Father         bladder    Colon polyps Neg Hx     Colon cancer Neg Hx        The following portions of the patient's history were reviewed and updated as appropriate: allergies, current medications, past medical history, past social history, past surgical history and problem list.    Results  No results found for this or any previous visit (from the past 1 hour(s)).]  Lab Results   Component Value Date    PSA 0.6 06/01/2022    PSA 0.6 03/03/2021    PSA 1.0 11/09/2020    PSA 1.9 10/03/2019     Lab Results   Component Value Date    GLUCOSE 105 03/11/2015    CALCIUM 9.5 11/30/2022     03/11/2015    K 4.2 11/30/2022    CO2 29 11/30/2022     11/30/2022    BUN 14 11/30/2022    CREATININE 0.94 11/30/2022     Lab Results   Component Value Date    WBC 10.36 (H) 04/26/2023    HGB 15.3 04/26/2023    HCT 48.1 04/26/2023    MCV 98 04/26/2023     04/26/2023       Kristie Hightower PA-C

## 2024-02-05 ENCOUNTER — OFFICE VISIT (OUTPATIENT)
Dept: UROLOGY | Facility: MEDICAL CENTER | Age: 74
End: 2024-02-05

## 2024-02-05 VITALS
DIASTOLIC BLOOD PRESSURE: 88 MMHG | WEIGHT: 256 LBS | OXYGEN SATURATION: 92 % | SYSTOLIC BLOOD PRESSURE: 132 MMHG | HEIGHT: 69 IN | BODY MASS INDEX: 37.92 KG/M2 | HEART RATE: 59 BPM

## 2024-02-05 DIAGNOSIS — Z12.5 SCREENING PSA (PROSTATE SPECIFIC ANTIGEN): ICD-10-CM

## 2024-02-05 DIAGNOSIS — N39.0 RECURRENT UTI: ICD-10-CM

## 2024-02-05 DIAGNOSIS — N40.0 BENIGN PROSTATIC HYPERPLASIA, UNSPECIFIED WHETHER LOWER URINARY TRACT SYMPTOMS PRESENT: Primary | ICD-10-CM

## 2024-02-05 DIAGNOSIS — Z87.440 HISTORY OF RECURRENT UTIS: ICD-10-CM

## 2024-02-05 LAB
POST-VOID RESIDUAL VOLUME, ML POC: 22 ML
SL AMB  POCT GLUCOSE, UA: ABNORMAL
SL AMB LEUKOCYTE ESTERASE,UA: ABNORMAL
SL AMB POCT BILIRUBIN,UA: ABNORMAL
SL AMB POCT BLOOD,UA: ABNORMAL
SL AMB POCT CLARITY,UA: CLEAR
SL AMB POCT COLOR,UA: YELLOW
SL AMB POCT KETONES,UA: ABNORMAL
SL AMB POCT NITRITE,UA: ABNORMAL
SL AMB POCT PH,UA: 6
SL AMB POCT SPECIFIC GRAVITY,UA: 1.02
SL AMB POCT URINE PROTEIN: ABNORMAL
SL AMB POCT UROBILINOGEN: 2

## 2024-02-26 DIAGNOSIS — E78.5 HYPERLIPIDEMIA, UNSPECIFIED HYPERLIPIDEMIA TYPE: ICD-10-CM

## 2024-02-26 RX ORDER — SIMVASTATIN 40 MG
40 TABLET ORAL
Qty: 90 TABLET | Refills: 1 | Status: SHIPPED | OUTPATIENT
Start: 2024-02-26

## 2024-02-28 ENCOUNTER — OFFICE VISIT (OUTPATIENT)
Dept: GASTROENTEROLOGY | Facility: CLINIC | Age: 74
End: 2024-02-28
Payer: MEDICARE

## 2024-02-28 VITALS
WEIGHT: 257 LBS | DIASTOLIC BLOOD PRESSURE: 82 MMHG | BODY MASS INDEX: 38.06 KG/M2 | HEIGHT: 69 IN | SYSTOLIC BLOOD PRESSURE: 120 MMHG

## 2024-02-28 DIAGNOSIS — K59.00 CONSTIPATION, UNSPECIFIED CONSTIPATION TYPE: Primary | ICD-10-CM

## 2024-02-28 DIAGNOSIS — K21.9 GASTROESOPHAGEAL REFLUX DISEASE, UNSPECIFIED WHETHER ESOPHAGITIS PRESENT: ICD-10-CM

## 2024-02-28 DIAGNOSIS — Z86.010 HISTORY OF COLONIC POLYPS: ICD-10-CM

## 2024-02-28 PROCEDURE — 99213 OFFICE O/P EST LOW 20 MIN: CPT | Performed by: INTERNAL MEDICINE

## 2024-02-28 PROCEDURE — G2211 COMPLEX E/M VISIT ADD ON: HCPCS | Performed by: INTERNAL MEDICINE

## 2024-02-28 NOTE — PROGRESS NOTES
[facility] - Outpatient Follow-up Note  Heath Dumas 73 y.o. male MRN: [id number]  Encounter: [id number]    ASSESSMENT AND PLAN:    1. Constipation, unspecified constipation type    2. History of colonic polyps    3. Gastroesophageal reflux disease, unspecified whether esophagitis present      Chronic idiopathic constipation.  The patient with chronic idiopathic constipation. He tried Linzess 145 mg, and caused diarrhea. It is okay to continue MiraLAX 17 g and titrate to a regular bowel movement. He can also take Gas-X or simethicone to help with his gas sensations.    History of colonic polyps.  He last had a colonoscopy in 2021. Recall colonoscopy recommended in 5 years, which is 2026.    GERD.  He will continue conservative measures including exercise, dietary changes, avoidance of triggers, and avoidance of late-night meals along with Nexium 40 mg once daily.    Follow-up  The patient will follow up in 1 year.    ---    Chief Complaint   Patient presents with   • Follow-up     No issues       HPI:   Heath Dumas is a 73 y.o. year old male     HPI: 11/27/2023  Heath Dumas is a 73 y.o. year old male with a significant past medical history of GERD and history of polyps who is presenting for consultation from Dr. Denise regarding constipation. He had a colonoscopy in 01/2021 with 2 sub centimeter polyps which were tubular adenomas also with diverticulosis of the sigmoid colon and grade 1 internal hemorrhoids; recommended repeat in 5 years. He does have a history of elevated LFTs most recently in 11/2022 with an AST of 71 and ALT of 86, alkaline phosphatase of 96 and total bilirubin of 0.75. He had a recent imaging at an outside facility in 09/2023.       Without any medications, he would have infrequent bowel movements, with small releases occurring 2 to 3 times a week. He associated the constipation with excessive gas. The gas improves after a bowel movement, but unless he  completely empties his bowels, it returns. He denies any fevers, chills, nausea, vomiting, or dysphagia. His heartburn is controlled on Nexium. He denies any hematochezia.    HPI: 2/28/2024  Heath Dumas is a 73-year-old male who presents for a follow-up encounter.    His bowel movements remain irregular, albeit improved. He is not currently taking Linzess. Despite daily bowel movements, he reports incomplete evacuation. He believes MiraLAX is more effective for his bowel condition.     He ceased smoking 10 years ago. He experiences flatulence during bowel movements and has not tried Gas-X or simethicone for this symptom.    He denies experiencing nausea, vomiting, dysphagia, abdominal pain or discomfort, and hematochezia.     Heartburn is well-managed with Nexium.      Answers submitted by the patient for this visit:  Abdominal Pain Questionnaire (Submitted on 2/28/2024)  Chief Complaint: Abdominal pain  anorexia: No  arthralgias: Yes  belching: No  constipation: Yes  diarrhea: No  dysuria: No  fever: No  flatus: Yes  frequency: No  headaches: No  hematochezia: No  hematuria: No  melena: No  myalgias: No  nausea: No  weight loss: No  vomiting: No      LAB/RADIOLOGY/ENDOSCOPY RESULTS:       Historical Information   Past Medical History:   Diagnosis Date   • AC joint arthropathy     right   • Allergic 2018   • Arthritis 2020   • BPH (benign prostatic hyperplasia)    • Carpal tunnel syndrome     bilateral   • Chronic prostatitis     last assessed: 3/1/2014   • Chronic UTI (urinary tract infection)    • Colon polyp    • Diverticulitis     twice   • Ganglion     last assessed: 10/7/2013   • GERD (gastroesophageal reflux disease)    • GERD without esophagitis    • Hyperlipidemia    • Impingement syndrome of right shoulder     last assessed: 3/22/2016   • Irritable bowel syndrome July 2022   • Left inguinal hernia     description: repaired by Dr. Harkins    • Peyronie's disease     had surgery   • Pneumonia 2019   •  Pneumonia of left upper lobe due to infectious organism     last assessed: 2/8/2016   • Sexual dysfunction    • Stomach disorder    • Urinary tract infection 2020   • Wears glasses      Past Surgical History:   Procedure Laterality Date   • ABDOMINAL SURGERY  2010   • COLONOSCOPY     • COLONOSCOPY     • CYSTOSCOPY      Diagnostic last assessed: 6/12/2014   • CYSTOSTOMY      urethral stricture   • HERNIA REPAIR      mesh left inguinal   • NEUROPLASTY / TRANSPOSITION MEDIAN NERVE AT CARPAL TUNNEL     • ND CYSTO INSERTION TRANSPROSTATIC IMPLANT SINGLE N/A 06/23/2020    Procedure: CYSTOSCOPY, URETHRAL DILATION,  WITH INSERTION UROLIFT;  Surgeon: Nabeel Silveira MD;  Location: [facility];  Service: Urology   • ND ENDOSCOPIC PLANTAR FASCIOTOMY Left 11/18/2016    Procedure: PLANTAR FASCIOTOMY;  Surgeon: Osmin Soto DPM;  Location: [facility];  Service: Podiatry   • ND EXCISION GANGLION WRIST DORSAL/VOLAR PRIMARY Left 01/24/2018    Procedure: WRIST RESECTION VOLAR GANGLION CYST;  Surgeon: Jessa Jackson MD;  Location: [facility];  Service: Orthopedics   • ND NEUROPLASTY &/TRANSPOS MEDIAN NRV CARPAL TUNNE Right 08/24/2016    Procedure: CARPAL TUNNEL RELEASE ;  Surgeon: Jessa Jackson MD;  Location: [facility];  Service: Orthopedics   • ND NEUROPLASTY &/TRANSPOS MEDIAN NRV CARPAL TUNNE Left 01/24/2018    Procedure: RELEASE CARPAL TUNNEL;  Surgeon: Jessa Jackson MD;  Location: [facility];  Service: Orthopedics     Social History     Substance and Sexual Activity   Alcohol Use Yes   • Alcohol/week: 3.0 standard drinks of alcohol   • Types: 3 Standard drinks or equivalent per week    Comment: social and denies alcohol use causing problems      Social History     Substance and Sexual Activity   Drug Use No     Social History     Tobacco Use   Smoking Status Former   • Current packs/day: 0.00   • Average packs/day: 1 pack/day for 44.3 years (44.3 ttl pk-yrs)   • Types: Cigarettes   • Start date: 9/15/1969   • Quit date: 1/1/2014   •  "Years since quitting: 10.1   Smokeless Tobacco Former   • Quit date: 1/1/2023     Family History   Problem Relation Age of Onset   • Cancer Father         bladder   • Colon polyps Neg Hx    • Colon cancer Neg Hx        Meds/Allergies     Current Outpatient Medications:   •  albuterol (PROVENTIL HFA,VENTOLIN HFA) 90 mcg/act inhaler  •  Cholecalciferol (VITAMIN D PO)  •  Coenzyme Q10 (CO Q 10 PO)  •  esomeprazole (NexIUM) 40 MG capsule  •  levocetirizine (XYZAL) 5 MG tablet  •  polyethylene glycol (GLYCOLAX) 17 GM/SCOOP powder  •  simvastatin (ZOCOR) 40 mg tablet  •  linaCLOtide (Linzess) 145 MCG CAPS  Allergies   Allergen Reactions   • Pollen Extract Allergic Rhinitis       PHYSICAL EXAM:    Blood pressure 120/82, height 5' 9\" (1.753 m), weight 117 kg (257 lb). Body mass index is 37.95 kg/m².      OTHER LAB RESULTS:   Lab Results   Component Value Date    WBC 10.36 (H) 04/26/2023    WBC 9.12 11/30/2022    WBC 7.57 03/03/2021    HGB 15.3 04/26/2023    HGB 15.1 11/30/2022    HGB 14.8 03/03/2021    MCV 98 04/26/2023     04/26/2023     11/30/2022     03/03/2021    INR 0.97 06/17/2014     Lab Results   Component Value Date     03/11/2015    K 4.2 11/30/2022     11/30/2022    CO2 29 11/30/2022    ANIONGAP 3 (L) 03/11/2015    BUN 14 11/30/2022    CREATININE 0.94 11/30/2022    GLUCOSE 105 03/11/2015    GLUF 100 (H) 11/30/2022    CALCIUM 9.5 11/30/2022    CORRECTEDCA 10.0 11/30/2022    AST 71 (H) 11/30/2022    AST 29 03/03/2021    AST 41 10/03/2019    ALT 86 (H) 11/30/2022    ALT 51 03/03/2021    ALT 54 10/03/2019    ALKPHOS 96 11/30/2022    ALKPHOS 74 03/03/2021    ALKPHOS 67 10/03/2019    PROT 7.8 11/23/2015    BILITOT 0.82 11/23/2015    BILITOT 0.8 03/11/2015    BILITOT 1.00 06/18/2014    EGFR 81 11/30/2022     No results found for: \"IRON\", \"TIBC\", \"FERRITIN\"  Lab Results   Component Value Date    LIPASE 116 03/11/2015       OTHER RADIOLOGY RESULTS:   No results found.    Transcribed for " Mini Armas MD, by Latosha Ordonez on 02/29/24 at 9:02 AM. Powered by Dragon [employer].

## 2024-04-17 ENCOUNTER — OFFICE VISIT (OUTPATIENT)
Dept: OBGYN CLINIC | Facility: MEDICAL CENTER | Age: 74
End: 2024-04-17
Payer: MEDICARE

## 2024-04-17 VITALS
BODY MASS INDEX: 38.48 KG/M2 | HEIGHT: 69 IN | HEART RATE: 62 BPM | SYSTOLIC BLOOD PRESSURE: 126 MMHG | WEIGHT: 259.8 LBS | DIASTOLIC BLOOD PRESSURE: 72 MMHG

## 2024-04-17 DIAGNOSIS — G89.29 CHRONIC PAIN OF BOTH KNEES: Primary | ICD-10-CM

## 2024-04-17 DIAGNOSIS — M25.562 CHRONIC PAIN OF BOTH KNEES: Primary | ICD-10-CM

## 2024-04-17 DIAGNOSIS — M17.0 PRIMARY OSTEOARTHRITIS OF BOTH KNEES: ICD-10-CM

## 2024-04-17 DIAGNOSIS — M25.561 CHRONIC PAIN OF BOTH KNEES: Primary | ICD-10-CM

## 2024-04-17 PROCEDURE — 99213 OFFICE O/P EST LOW 20 MIN: CPT | Performed by: EMERGENCY MEDICINE

## 2024-04-17 PROCEDURE — 20610 DRAIN/INJ JOINT/BURSA W/O US: CPT | Performed by: EMERGENCY MEDICINE

## 2024-04-17 RX ORDER — METHYLPREDNISOLONE ACETATE 40 MG/ML
1 INJECTION, SUSPENSION INTRA-ARTICULAR; INTRALESIONAL; INTRAMUSCULAR; SOFT TISSUE
Status: COMPLETED | OUTPATIENT
Start: 2024-04-17 | End: 2024-04-17

## 2024-04-17 RX ORDER — LIDOCAINE HYDROCHLORIDE 10 MG/ML
4 INJECTION, SOLUTION INFILTRATION; PERINEURAL
Status: COMPLETED | OUTPATIENT
Start: 2024-04-17 | End: 2024-04-17

## 2024-04-17 RX ADMIN — LIDOCAINE HYDROCHLORIDE 4 ML: 10 INJECTION, SOLUTION INFILTRATION; PERINEURAL at 12:15

## 2024-04-17 RX ADMIN — METHYLPREDNISOLONE ACETATE 1 ML: 40 INJECTION, SUSPENSION INTRA-ARTICULAR; INTRALESIONAL; INTRAMUSCULAR; SOFT TISSUE at 12:15

## 2024-04-17 NOTE — PROGRESS NOTES
Assessment/Plan:    Diagnoses and all orders for this visit:    Chronic pain of both knees  -     Large joint arthrocentesis: bilateral knee    Primary osteoarthritis of both knees  -     Large joint arthrocentesis: bilateral knee    B/L Knee CSI provided today (repeat Right, initial left)    No follow-ups on file.      Subjective:   Patient ID: Heath Dumas is a 74 y.o. male.    Returns to the office for bilateral knee pain.  Pain is mostly anterior medial, he had significant benefit from prior corticosteroid injection of the right knee and July 2023.        Review of Systems    The following portions of the patient's chart were reviewed and updated as appropriate:   Allergy:    Allergies   Allergen Reactions    Pollen Extract Allergic Rhinitis       Medications:    Current Outpatient Medications:     albuterol (PROVENTIL HFA,VENTOLIN HFA) 90 mcg/act inhaler, Inhale 2 puffs every 6 (six) hours as needed for wheezing, Disp: 18 g, Rfl: 3    Cholecalciferol (VITAMIN D PO), Take by mouth, Disp: , Rfl:     Coenzyme Q10 (CO Q 10 PO), Take 200 mg by mouth, Disp: , Rfl:     esomeprazole (NexIUM) 40 MG capsule, Take 1 capsule (40 mg total) by mouth daily, Disp: 180 capsule, Rfl: 1    levocetirizine (XYZAL) 5 MG tablet, Take 5 mg by mouth every evening, Disp: , Rfl:     polyethylene glycol (GLYCOLAX) 17 GM/SCOOP powder, Take 17 g by mouth daily, Disp: 850 g, Rfl: 3    simvastatin (ZOCOR) 40 mg tablet, Take 1 tablet (40 mg total) by mouth daily at bedtime, Disp: 90 tablet, Rfl: 1    linaCLOtide (Linzess) 145 MCG CAPS, Take 1 capsule (145 mcg total) by mouth daily, Disp: 30 capsule, Rfl: 2    Patient Active Problem List   Diagnosis    Tendinitis of left rotator cuff    Biceps tendonitis on left    Benign colon polyp    Hyperlipidemia    GERD without esophagitis    Former smoker -quit 2014    Frequent urination    Other urethral stricture, male, unspecified site    Centrilobular emphysema (HCC)    ILD (interstitial lung  "disease) (HCC)    Obesity, morbid (HCC)    Allergic rhinitis    Lung nodule    History of colonic polyps    Constipation    Multiple drug resistant organism (MDRO) culture positive       Objective:  /72   Pulse 62   Ht 5' 9\" (1.753 m)   Wt 118 kg (259 lb 12.8 oz)   BMI 38.37 kg/m²     Right Knee Exam     Tenderness   The patient is experiencing tenderness in the medial joint line.    Other   Erythema: absent  Sensation: normal  Swelling: mild      Left Knee Exam     Tenderness   The patient is experiencing tenderness in the medial joint line.    Other   Erythema: absent  Sensation: normal  Swelling: mild            Physical Exam      Neurologic Exam    Large joint arthrocentesis: bilateral knee  Universal Protocol:  Consent: Verbal consent obtained.  Risks and benefits: risks, benefits and alternatives were discussed  Consent given by: patient  Time out: Immediately prior to procedure a \"time out\" was called to verify the correct patient, procedure, equipment, support staff and site/side marked as required.  Timeout called at: 4/17/2024 12:21 PM.  Patient understanding: patient states understanding of the procedure being performed  Test results: test results available and properly labeled  Site marked: the operative site was marked  Patient identity confirmed: verbally with patient  Supporting Documentation  Indications: pain   Procedure Details  Location: knee - bilateral knee  Preparation: Patient was prepped and draped in the usual sterile fashion  Needle size: 22 G  Ultrasound guidance: no  Approach: anterolateral    Medications (Right): 4 mL lidocaine 1 %; 1 mL methylPREDNISolone acetate 40 mg/mLMedications (Left): 4 mL lidocaine 1 %; 1 mL methylPREDNISolone acetate 40 mg/mL   Patient tolerance: patient tolerated the procedure well with no immediate complications  Dressing:  Sterile dressing applied    No erythema of knee(s)          I have personally reviewed the written report of the pertinent " studies.             Past Medical History:   Diagnosis Date    AC joint arthropathy     right    Allergic 2018    Arthritis 2020    BPH (benign prostatic hyperplasia)     Carpal tunnel syndrome     bilateral    Chronic prostatitis     last assessed: 3/1/2014    Chronic UTI (urinary tract infection)     Colon polyp     Diverticulitis     twice    Ganglion     last assessed: 10/7/2013    GERD (gastroesophageal reflux disease)     GERD without esophagitis     Hyperlipidemia     Impingement syndrome of right shoulder     last assessed: 3/22/2016    Irritable bowel syndrome July 2022    Left inguinal hernia     description: repaired by Dr. 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    Pneumonia 2019    Pneumonia of left upper lobe due to infectious organism     last assessed: 2/8/2016    Sexual dysfunction     Stomach disorder     Urinary tract infection 2020    Wears glasses        Past Surgical History:   Procedure Laterality Date    ABDOMINAL SURGERY  2010    COLONOSCOPY      COLONOSCOPY      CYSTOSCOPY      Diagnostic last assessed: 6/12/2014    CYSTOSTOMY      urethral stricture    HERNIA REPAIR      mesh left inguinal    NEUROPLASTY / TRANSPOSITION MEDIAN NERVE AT CARPAL TUNNEL      AR CYSTO INSERTION TRANSPROSTATIC IMPLANT SINGLE N/A 06/23/2020    Procedure: CYSTOSCOPY, URETHRAL DILATION,  WITH INSERTION UROLIFT;  Surgeon: Nabeel Silveira MD;  [facility];  Service: Urology    AR ENDOSCOPIC PLANTAR FASCIOTOMY Left 11/18/2016    Procedure: PLANTAR FASCIOTOMY;  Surgeon: Osmin Soto DPM;  [facility];  Service: Podiatry    AR EXCISION GANGLION WRIST DORSAL/VOLAR PRIMARY Left 01/24/2018    Procedure: WRIST RESECTION VOLAR GANGLION CYST;  Surgeon: Jessa Jackson MD;  [facility];  Service: Orthopedics    AR NEUROPLASTY &/TRANSPOS MEDIAN NRV CARPAL TUNNE Right 08/24/2016    Procedure: CARPAL TUNNEL RELEASE ;  Surgeon: Jessa Jackson MD;  [facility];  Service: Orthopedics    AR  NEUROPLASTY &/TRANSPOS MEDIAN NRV CARPAL TUNNE Left 01/24/2018    Procedure: RELEASE CARPAL TUNNEL;  Surgeon: Jessa Jackson MD;  [facility];  Service: Orthopedics       Social History     Socioeconomic History    Marital status:      Spouse name: Not on file    Number of children: 1    Years of education: Not on file    Highest education level: Not on file   Occupational History    Occupation: Working full time    Tobacco Use    Smoking status: Former     Current packs/day: 0.00     Average packs/day: 1 pack/day for 44.3 years (44.3 ttl pk-yrs)     Types: Cigarettes     Start date: 9/15/1969     Quit date: 1/1/2014     Years since quitting: 10.2    Smokeless tobacco: Former     Quit date: 1/1/2023   Vaping Use    Vaping status: Never Used   Substance and Sexual Activity    Alcohol use: Yes     Alcohol/week: 3.0 standard drinks of alcohol     Types: 3 Standard drinks or equivalent per week     Comment: social and denies alcohol use causing problems     Drug use: No    Sexual activity: Not Currently     Partners: Male   Other Topics Concern    Not on file   Social History Narrative    Not on file     Social Determinants of Health     Financial Resource Strain: Low Risk  (10/8/2020)    Overall Financial Resource Strain (CARDIA)     Difficulty of Paying Living Expenses: Not hard at all   Food Insecurity: No Food Insecurity (10/8/2020)    Hunger Vital Sign     Worried About Running Out of Food in the Last Year: Never true     Ran Out of Food in the Last Year: Never true   Transportation Needs: No Transportation Needs (10/8/2020)    PRAPARE - Transportation     Lack of Transportation (Medical): No     Lack of Transportation (Non-Medical): No   Physical Activity: Inactive (6/29/2020)    Exercise Vital Sign     Days of Exercise per Week: 0 days     Minutes of Exercise per Session: 0 min   Stress: No Stress Concern Present (6/29/2020)    [place] [facility] [employer] - Occupational Stress  Questionnaire     Feeling of Stress : Not at all   Social Connections: Socially Isolated (6/29/2020)    Social Connection and Isolation Panel [NHANES]     Frequency of Communication with Friends and Family: Twice a week     Frequency of Social Gatherings with Friends and Family: Once a week     Attends Religion Services: Never     Active Member of Clubs or Organizations: No     Attends Club or Organization Meetings: Never     Marital Status:    Intimate Partner Violence: Not At Risk (7/18/2022)    Humiliation, Afraid, Rape, and Kick questionnaire     Fear of Current or Ex-Partner: No     Emotionally Abused: No     Physically Abused: No     Sexually Abused: No   Housing Stability: Not on file       Family History   Problem Relation Age of Onset    Cancer Father         bladder    Colon polyps Neg Hx     Colon cancer Neg Hx

## 2024-04-24 NOTE — PROGRESS NOTES
Daily Note     Today's date: 3/20/2018  Patient name: Eleonora Richmond  : 1950  MRN: [id number]  Referring provider: Carlotta Sousa MD  Dx:   Encounter Diagnosis     ICD-10-CM    1  Rotator cuff tendonitis, left M75 82    2  Biceps tendonitis on left M75 22    3  Tendinitis of left rotator cuff M75 82    4  Ganglion of left wrist M67 432    5  Carpal tunnel syndrome of left wrist G56 02                   Subjective: Pt reports that his L shoulder has continued to be sore since last visit  Objective: See treatment diary below  Precautions: recent left Carpal tunnel release and ganglion cyst excision  Manuals 3/4/18 3/6 3/8 3/13 3/15 3/19       Shoulder mobs grade 3 post and inf mobs nv nv  np 4' nv       Shoulder prom all planes nv Not kendra  np 5' IR/ER 4'       Left shldr pec infraspinatus and subscap tpr nv 8 8 6' 6' 6'       Left wrist scar mobilization nv 6 8 8' 5' 5'       Exercise Diary     3/13 3/15 3/19       UBE nv nv           Shoulder rows and ext Blue nv Green  2x10  np         Shoulder Er  Green nv grn  2x10  np         Shoulder IR Dbl green nv Dbl grn  2x10  np         Wrist 4 way Green nv Green  2x10 all  np  Green 2x10       Pro sup (stick) 1# nv 0#  2x10  0#  2x10  0# 2x10       gripper 50# nv 50#  2x10  50#  2x10  50# 2x10       finkelstein stretch    15"x3  15"x3       Shoulder iso    Abd, extx10         S/l ER     x10 x10       S/l abd (25-90deg)     x10 x10       Prone rows     x10 x15       Prone ext     x10 x10       Prone Habd     2x10 unable to complete                                                                                                               Modalities  3/8 3/13    3/20        CP with TENS shouder  15'  seated Post treatment  declinced                         Assessment: Pt had difficulty performing table shoulder exercises and was only able to tolerate PROM for IR/ER due to pain in the L shoulder as patient was not able to relax with passive motion   Patient In order to meet Medicare requirements, the clinical documentation must support the information cited in the admission order.  Please be sure to provide detailed and clear documentation about the following in the admitting note/history and physical: demonstrated fatigue post treatment and would benefit from continued PT      Plan: Continue per plan of care

## 2024-06-12 DIAGNOSIS — K21.9 GASTROESOPHAGEAL REFLUX DISEASE WITHOUT ESOPHAGITIS: ICD-10-CM

## 2024-06-12 RX ORDER — ESOMEPRAZOLE MAGNESIUM 40 MG/1
40 CAPSULE, DELAYED RELEASE ORAL DAILY
Qty: 90 CAPSULE | Refills: 0 | Status: SHIPPED | OUTPATIENT
Start: 2024-06-12

## 2024-06-17 ENCOUNTER — TELEPHONE (OUTPATIENT)
Dept: FAMILY MEDICINE CLINIC | Facility: CLINIC | Age: 74
End: 2024-06-17

## 2024-06-17 NOTE — TELEPHONE ENCOUNTER
Left a message for Heath to call us back to schedule an appt for his medicare wellness He wanted Mon Aug 26- Dr Denise doesn't have any appts that day

## 2024-08-14 ENCOUNTER — OFFICE VISIT (OUTPATIENT)
Dept: OBGYN CLINIC | Facility: MEDICAL CENTER | Age: 74
End: 2024-08-14
Payer: MEDICARE

## 2024-08-14 VITALS
DIASTOLIC BLOOD PRESSURE: 82 MMHG | WEIGHT: 253 LBS | HEIGHT: 69 IN | HEART RATE: 74 BPM | SYSTOLIC BLOOD PRESSURE: 144 MMHG | BODY MASS INDEX: 37.47 KG/M2

## 2024-08-14 DIAGNOSIS — M25.561 CHRONIC PAIN OF BOTH KNEES: Primary | ICD-10-CM

## 2024-08-14 DIAGNOSIS — M17.0 PRIMARY OSTEOARTHRITIS OF BOTH KNEES: ICD-10-CM

## 2024-08-14 DIAGNOSIS — G89.29 CHRONIC PAIN OF BOTH KNEES: Primary | ICD-10-CM

## 2024-08-14 DIAGNOSIS — M25.562 CHRONIC PAIN OF BOTH KNEES: Primary | ICD-10-CM

## 2024-08-14 PROCEDURE — 99213 OFFICE O/P EST LOW 20 MIN: CPT | Performed by: EMERGENCY MEDICINE

## 2024-08-14 NOTE — PROGRESS NOTES
Assessment/Plan:    Diagnoses and all orders for this visit:    Chronic pain of both knees  -     Injection Procedure Prior Authorization; Future  -     Ambulatory Referral to Orthopedic Surgery; Future    Primary osteoarthritis of both knees  -     Injection Procedure Prior Authorization; Future  -     Ambulatory Referral to Orthopedic Surgery; Future      VISCOSUPPLEMENTATION:  Patient has failed conservative treatment including:  NSAIDs   Tylenol   Home exercises   Weight loss (9 lbs)  Assistive devices.    Patient has failed corticosteroid injection  Patient is symptomatic and pain interferes with ADLs/sleep  Pain score 7/10      No follow-ups on file.      Subjective:   Patient ID: Heath Dumas is a 74 y.o. male.    Returns to the office for bilateral knee pain.  He had no benefit from prior CSI's performed the last evaluation 4/2024.  He is interested in viscosupplementation  He states he is trying to lose weight to help his knee pain as well he has lost 9 pounds through calorie counting and intermittent fasting        Review of Systems    The following portions of the patient's chart were reviewed and updated as appropriate:   Allergy:    Allergies   Allergen Reactions    Pollen Extract Allergic Rhinitis       Medications:    Current Outpatient Medications:     albuterol (PROVENTIL HFA,VENTOLIN HFA) 90 mcg/act inhaler, Inhale 2 puffs every 6 (six) hours as needed for wheezing, Disp: 18 g, Rfl: 3    Cholecalciferol (VITAMIN D PO), Take by mouth, Disp: , Rfl:     Coenzyme Q10 (CO Q 10 PO), Take 200 mg by mouth, Disp: , Rfl:     esomeprazole (NexIUM) 40 MG capsule, Take 1 capsule (40 mg total) by mouth daily, Disp: 90 capsule, Rfl: 0    levocetirizine (XYZAL) 5 MG tablet, Take 5 mg by mouth every evening, Disp: , Rfl:     polyethylene glycol (GLYCOLAX) 17 GM/SCOOP powder, Take 17 g by mouth daily, Disp: 850 g, Rfl: 3    simvastatin (ZOCOR) 40 mg tablet, Take 1 tablet (40 mg total) by mouth daily at bedtime,  "Disp: 90 tablet, Rfl: 1    linaCLOtide (Linzess) 145 MCG CAPS, Take 1 capsule (145 mcg total) by mouth daily, Disp: 30 capsule, Rfl: 2    Patient Active Problem List   Diagnosis    Tendinitis of left rotator cuff    Biceps tendonitis on left    Benign colon polyp    Hyperlipidemia    GERD without esophagitis    Former smoker -quit 2014    Frequent urination    Other urethral stricture, male, unspecified site    Centrilobular emphysema (HCC)    ILD (interstitial lung disease) (HCC)    Obesity, morbid (HCC)    Allergic rhinitis    Lung nodule    History of colonic polyps    Constipation    Multiple drug resistant organism (MDRO) culture positive       Objective:  /82   Pulse 74   Ht 5' 9\" (1.753 m)   Wt 115 kg (253 lb)   BMI 37.36 kg/m²     Ortho Exam    Physical Exam      Neurologic Exam    Procedures    I have personally reviewed the written report of the pertinent studies.             Past Medical History:   Diagnosis Date    AC joint arthropathy     right    Allergic 2018    Arthritis 2020    BPH (benign prostatic hyperplasia)     Carpal tunnel syndrome     bilateral    Chronic prostatitis     last assessed: 3/1/2014    Chronic UTI (urinary tract infection)     Colon polyp     Diverticulitis     twice    Ganglion     last assessed: 10/7/2013    GERD (gastroesophageal reflux disease)     GERD without esophagitis     Hyperlipidemia     Impingement syndrome of right shoulder     last assessed: 3/22/2016    Irritable bowel syndrome July 2022    Left inguinal hernia     description: repaired by Dr. 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    Pneumonia 2019    Pneumonia of left upper lobe due to infectious organism     last assessed: 2/8/2016    Sexual dysfunction     Stomach disorder     Urinary tract infection 2020    Wears glasses        Past Surgical History:   Procedure Laterality Date    ABDOMINAL SURGERY  2010    COLONOSCOPY      COLONOSCOPY      CYSTOSCOPY      Diagnostic last assessed: 6/12/2014 "    CYSTOSTOMY      urethral stricture    HERNIA REPAIR      mesh left inguinal    NEUROPLASTY / TRANSPOSITION MEDIAN NERVE AT CARPAL TUNNEL      ND CYSTO INSERTION TRANSPROSTATIC IMPLANT SINGLE N/A 06/23/2020    Procedure: CYSTOSCOPY, URETHRAL DILATION,  WITH INSERTION UROLIFT;  Surgeon: Nabeel Silveira MD;  [facility];  Service: Urology    ND ENDOSCOPIC PLANTAR FASCIOTOMY Left 11/18/2016    Procedure: PLANTAR FASCIOTOMY;  Surgeon: Osmin Soto DPM;  [facility];  Service: Podiatry    ND EXCISION GANGLION WRIST DORSAL/VOLAR PRIMARY Left 01/24/2018    Procedure: WRIST RESECTION VOLAR GANGLION CYST;  Surgeon: Jessa Jackson MD;  [facility];  Service: Orthopedics    ND NEUROPLASTY &/TRANSPOS MEDIAN NRV CARPAL TUNNE Right 08/24/2016    Procedure: CARPAL TUNNEL RELEASE ;  Surgeon: Jessa Jackson MD;  [facility];  Service: Orthopedics    ND NEUROPLASTY &/TRANSPOS MEDIAN NRV CARPAL TUNNE Left 01/24/2018    Procedure: RELEASE CARPAL TUNNEL;  Surgeon: Jessa Jackson MD;  [facility];  Service: Orthopedics       Social History     Socioeconomic History    Marital status:      Spouse name: Not on file    Number of children: 1    Years of education: Not on file    Highest education level: Not on file   Occupational History    Occupation: Working full time    Tobacco Use    Smoking status: Former     Current packs/day: 0.00     Average packs/day: 1 pack/day for 44.3 years (44.3 ttl pk-yrs)     Types: Cigarettes     Start date: 9/15/1969     Quit date: 1/1/2014     Years since quitting: 10.6    Smokeless tobacco: Former     Quit date: 1/1/2023   Vaping Use    Vaping status: Never Used   Substance and Sexual Activity    Alcohol use: Yes     Alcohol/week: 3.0 standard drinks of alcohol     Types: 3 Standard drinks or equivalent per week     Comment: social and denies alcohol use causing problems     Drug use: No    Sexual activity: Not Currently     Partners: Male   Other Topics  Concern    Not on file   Social History Narrative    Not on file     Social Determinants of Health     Financial Resource Strain: Low Risk  (8/5/2024)    Received from [facility]    Overall Financial Resource Strain (CARDIA)     Difficulty of Paying Living Expenses: Not hard at all   Food Insecurity: No Food Insecurity (8/5/2024)    Received from [facility]    Hunger Vital Sign     Worried About Running Out of Food in the Last Year: Never true     Ran Out of Food in the Last Year: Never true   Transportation Needs: No Transportation Needs (8/5/2024)    Received from [facility]    PRAPARE - Transportation     Lack of Transportation (Medical): No     Lack of Transportation (Non-Medical): No   Physical Activity: Inactive (6/29/2020)    Exercise Vital Sign     Days of Exercise per Week: 0 days     Minutes of Exercise per Session: 0 min   Stress: No Stress Concern Present (6/29/2020)    [place] [employer] - Occupational Stress Questionnaire     Feeling of Stress : Not at all   Social Connections: Socially Isolated (6/29/2020)    Social Connection and Isolation Panel [NHANES]     Frequency of Communication with Friends and Family: Twice a week     Frequency of Social Gatherings with Friends and Family: Once a week     Attends Restorationist Services: Never     Active Member of Clubs or Organizations: No     Attends Club or Organization Meetings: Never     Marital Status:    Intimate Partner Violence: Not At Risk (8/5/2024)    Received from [facility]    Humiliation, Afraid, Rape, and Kick questionnaire     Fear of Current or Ex-Partner: No     Emotionally Abused: No     Physically Abused: No     Sexually Abused: No   Housing Stability: Unknown (8/5/2024)    Received from [facility]    Housing Stability Vital Sign     Unable to Pay for Housing in the Last Year: No     Number of Times Moved in the Last Year:  Not on file     Homeless in the Last Year: No       Family History   Problem Relation Age of Onset    Cancer Father         bladder    Colon polyps Neg Hx     Colon cancer Neg Hx

## 2024-08-15 DIAGNOSIS — E78.5 HYPERLIPIDEMIA, UNSPECIFIED HYPERLIPIDEMIA TYPE: ICD-10-CM

## 2024-08-16 RX ORDER — SIMVASTATIN 40 MG
40 TABLET ORAL
Qty: 90 TABLET | Refills: 3 | Status: SHIPPED | OUTPATIENT
Start: 2024-08-16

## 2024-08-28 ENCOUNTER — PROCEDURE VISIT (OUTPATIENT)
Dept: OBGYN CLINIC | Facility: MEDICAL CENTER | Age: 74
End: 2024-08-28
Payer: MEDICARE

## 2024-08-28 VITALS
HEART RATE: 72 BPM | SYSTOLIC BLOOD PRESSURE: 118 MMHG | WEIGHT: 253 LBS | DIASTOLIC BLOOD PRESSURE: 82 MMHG | HEIGHT: 69 IN | BODY MASS INDEX: 37.47 KG/M2

## 2024-08-28 DIAGNOSIS — G89.29 CHRONIC PAIN OF BOTH KNEES: Primary | ICD-10-CM

## 2024-08-28 DIAGNOSIS — M17.0 PRIMARY OSTEOARTHRITIS OF BOTH KNEES: ICD-10-CM

## 2024-08-28 DIAGNOSIS — M25.562 CHRONIC PAIN OF BOTH KNEES: Primary | ICD-10-CM

## 2024-08-28 DIAGNOSIS — M25.561 CHRONIC PAIN OF BOTH KNEES: Primary | ICD-10-CM

## 2024-08-28 PROCEDURE — 20610 DRAIN/INJ JOINT/BURSA W/O US: CPT | Performed by: EMERGENCY MEDICINE

## 2024-08-28 NOTE — PROGRESS NOTES
Assessment/Plan:    Diagnoses and all orders for this visit:    Chronic pain of both knees  -     Large joint arthrocentesis: bilateral knee    Primary osteoarthritis of both knees  -     Large joint arthrocentesis: bilateral knee    Patient return to the office for bilateral knee Visco injections.  Bilateral Durolane provided.  He is scheduled to see Dr. Perez in several months to discuss further treatment options, as he has had no benefit from most recent CSI's.    Return if symptoms worsen or fail to improve.      Subjective:   Patient ID: Heath Dumas is a 74 y.o. male.    HPI    Review of Systems    The following portions of the patient's chart were reviewed and updated as appropriate:   Allergy:    Allergies   Allergen Reactions    Pollen Extract Allergic Rhinitis       Medications:    Current Outpatient Medications:     albuterol (PROVENTIL HFA,VENTOLIN HFA) 90 mcg/act inhaler, Inhale 2 puffs every 6 (six) hours as needed for wheezing, Disp: 18 g, Rfl: 3    Cholecalciferol (VITAMIN D PO), Take by mouth, Disp: , Rfl:     Coenzyme Q10 (CO Q 10 PO), Take 200 mg by mouth, Disp: , Rfl:     esomeprazole (NexIUM) 40 MG capsule, Take 1 capsule (40 mg total) by mouth daily, Disp: 90 capsule, Rfl: 0    levocetirizine (XYZAL) 5 MG tablet, Take 5 mg by mouth every evening, Disp: , Rfl:     polyethylene glycol (GLYCOLAX) 17 GM/SCOOP powder, Take 17 g by mouth daily, Disp: 850 g, Rfl: 3    simvastatin (ZOCOR) 40 mg tablet, TAKE 1 TABLET BY MOUTH ONCE DAILY AT BEDTIME, Disp: 90 tablet, Rfl: 3    linaCLOtide (Linzess) 145 MCG CAPS, Take 1 capsule (145 mcg total) by mouth daily, Disp: 30 capsule, Rfl: 2    Patient Active Problem List   Diagnosis    Tendinitis of left rotator cuff    Biceps tendonitis on left    Benign colon polyp    Hyperlipidemia    GERD without esophagitis    Former smoker -quit 2014    Frequent urination    Other urethral stricture, male, unspecified site    Centrilobular emphysema (HCC)    ILD  "(interstitial lung disease) (HCC)    Obesity, morbid (HCC)    Allergic rhinitis    Lung nodule    History of colonic polyps    Constipation    Multiple drug resistant organism (MDRO) culture positive       Objective:  /82   Pulse 72   Ht 5' 9\" (1.753 m)   Wt 115 kg (253 lb)   BMI 37.36 kg/m²     Right Knee Exam     Other   Erythema: absent      Left Knee Exam     Other   Erythema: absent            Physical Exam      Neurologic Exam    Large joint arthrocentesis: bilateral knee  Universal Protocol:  Consent: Verbal consent obtained.  Risks and benefits: risks, benefits and alternatives were discussed  Consent given by: patient  Time out: Immediately prior to procedure a \"time out\" was called to verify the correct patient, procedure, equipment, support staff and site/side marked as required.  Timeout called at: 8/28/2024 10:43 AM.  Patient understanding: patient states understanding of the procedure being performed  Test results: test results available and properly labeled  Site marked: the operative site was marked  Patient identity confirmed: verbally with patient  Supporting Documentation  Indications: pain   Procedure Details  Location: knee - bilateral knee  Preparation: Patient was prepped and draped in the usual sterile fashion  Needle size: 20 G  Ultrasound guidance: no  Approach: anterolateral    Medications (Right): 3 mL sodium hyaluronate 60 MG/3MLMedications (Left): 3 mL sodium hyaluronate 60 MG/3ML   Patient tolerance: patient tolerated the procedure well with no immediate complications  Dressing:  Sterile dressing applied    No erythema of knees          I have personally reviewed the written report of the pertinent studies.             Past Medical History:   Diagnosis Date    AC joint arthropathy     right    Allergic 2018    Arthritis 2020    BPH (benign prostatic hyperplasia)     Carpal tunnel syndrome     bilateral    Chronic prostatitis     last assessed: 3/1/2014    Chronic UTI (urinary " tract infection)     Colon polyp     Diverticulitis     twice    Ganglion     last assessed: 10/7/2013    GERD (gastroesophageal reflux disease)     GERD without esophagitis     Hyperlipidemia     Impingement syndrome of right shoulder     last assessed: 3/22/2016    Irritable bowel syndrome July 2022    Left inguinal hernia     description: repaired by Dr. Shahana Aguilar's disease     had surgery    Pneumonia 2019    Pneumonia of left upper lobe due to infectious organism     last assessed: 2/8/2016    Sexual dysfunction     Stomach disorder     Urinary tract infection 2020    Wears glasses        Past Surgical History:   Procedure Laterality Date    ABDOMINAL SURGERY  2010    COLONOSCOPY      COLONOSCOPY      CYSTOSCOPY      Diagnostic last assessed: 6/12/2014    CYSTOSTOMY      urethral stricture    HERNIA REPAIR      mesh left inguinal    NEUROPLASTY / TRANSPOSITION MEDIAN NERVE AT CARPAL TUNNEL      WI CYSTO INSERTION TRANSPROSTATIC IMPLANT SINGLE N/A 06/23/2020    Procedure: CYSTOSCOPY, URETHRAL DILATION,  WITH INSERTION UROLIFT;  Surgeon: Nabeel Silveira MD;  [facility];  Service: Urology    WI ENDOSCOPIC PLANTAR FASCIOTOMY Left 11/18/2016    Procedure: PLANTAR FASCIOTOMY;  Surgeon: Osmin Soto DPM;  [facility];  Service: Podiatry    WI EXCISION GANGLION WRIST DORSAL/VOLAR PRIMARY Left 01/24/2018    Procedure: WRIST RESECTION VOLAR GANGLION CYST;  Surgeon: Jessa Jackson MD;  [facility];  Service: Orthopedics    WI NEUROPLASTY &/TRANSPOS MEDIAN NRV CARPAL TUNNE Right 08/24/2016    Procedure: CARPAL TUNNEL RELEASE ;  Surgeon: Jessa Jackson MD;  [facility];  Service: Orthopedics    WI NEUROPLASTY &/TRANSPOS MEDIAN NRV CARPAL TUNNE Left 01/24/2018    Procedure: RELEASE CARPAL TUNNEL;  Surgeon: Jessa Jackson MD;  [facility];  Service: Orthopedics       Social History     Socioeconomic History    Marital status:      Spouse name: Not on file    Number of  children: 1    Years of education: Not on file    Highest education level: Not on file   Occupational History    Occupation: Working full time    Tobacco Use    Smoking status: Former     Current packs/day: 0.00     Average packs/day: 1 pack/day for 44.3 years (44.3 ttl pk-yrs)     Types: Cigarettes     Start date: 9/15/1969     Quit date: 1/1/2014     Years since quitting: 10.6    Smokeless tobacco: Former     Quit date: 1/1/2023   Vaping Use    Vaping status: Never Used   Substance and Sexual Activity    Alcohol use: Yes     Alcohol/week: 3.0 standard drinks of alcohol     Types: 3 Standard drinks or equivalent per week     Comment: social and denies alcohol use causing problems     Drug use: No    Sexual activity: Not Currently     Partners: Male   Other Topics Concern    Not on file   Social History Narrative    Not on file     Social Determinants of Health     Financial Resource Strain: Low Risk  (8/5/2024)    Received from [facility]    Overall Financial Resource Strain (CARDIA)     Difficulty of Paying Living Expenses: Not hard at all   Food Insecurity: No Food Insecurity (8/5/2024)    Received from [facility]    Hunger Vital Sign     Worried About Running Out of Food in the Last Year: Never true     Ran Out of Food in the Last Year: Never true   Transportation Needs: No Transportation Needs (8/5/2024)    Received from [facility]    PRAPARE - Transportation     Lack of Transportation (Medical): No     Lack of Transportation (Non-Medical): No   Physical Activity: Inactive (6/29/2020)    Exercise Vital Sign     Days of Exercise per Week: 0 days     Minutes of Exercise per Session: 0 min   Stress: No Stress Concern Present (6/29/2020)    [employer] [place] [employer] - Occupational Stress Questionnaire     Feeling of Stress : Not at all   Social Connections: Socially Isolated (6/29/2020)    Social Connection and Isolation Panel [NHANES]      Frequency of Communication with Friends and Family: Twice a week     Frequency of Social Gatherings with Friends and Family: Once a week     Attends Zoroastrian Services: Never     Active Member of Clubs or Organizations: No     Attends Club or Organization Meetings: Never     Marital Status:    Intimate Partner Violence: Not At Risk (8/5/2024)    Received from [facility]    Humiliation, Afraid, Rape, and Kick questionnaire     Fear of Current or Ex-Partner: No     Emotionally Abused: No     Physically Abused: No     Sexually Abused: No   Housing Stability: Unknown (8/5/2024)    Received from [facility]    Housing Stability Vital Sign     Unable to Pay for Housing in the Last Year: No     Number of Times Moved in the Last Year: Not on file     Homeless in the Last Year: No       Family History   Problem Relation Age of Onset    Cancer Father         bladder    Colon polyps Neg Hx     Colon cancer Neg Hx

## 2024-08-29 ENCOUNTER — TELEPHONE (OUTPATIENT)
Dept: OBGYN CLINIC | Facility: HOSPITAL | Age: 74
End: 2024-08-29

## 2024-08-29 NOTE — TELEPHONE ENCOUNTER
Called and spoke with pt and relayed Dr Higgins's message. Pt states he had to miss work today due to the pain. He is doing all the things we just advised him to do. There is no redness, fever or chills. He is requesting a work note for today and tomorrow because he cannot work due to the pain. Please review. Thanks!

## 2024-08-29 NOTE — TELEPHONE ENCOUNTER
Caller: Patient    Doctor: Ronaldo    Reason for call: Patients right knee is swollen and he needs to walk with a cane after having an injection yesterday. Patient is requesting a call back    Call back#: [phone]

## 2024-09-09 ENCOUNTER — HOSPITAL ENCOUNTER (OUTPATIENT)
Dept: CT IMAGING | Facility: HOSPITAL | Age: 74
Discharge: HOME | End: 2024-09-09
Attending: INTERNAL MEDICINE
Payer: MEDICARE

## 2024-09-09 DIAGNOSIS — J84.9 ILD (INTERSTITIAL LUNG DISEASE) (HCC): ICD-10-CM

## 2024-09-09 PROCEDURE — G1004 CDSM NDSC: HCPCS

## 2024-09-09 PROCEDURE — 71250 CT THORAX DX C-: CPT

## 2024-09-11 DIAGNOSIS — K21.9 GASTROESOPHAGEAL REFLUX DISEASE WITHOUT ESOPHAGITIS: ICD-10-CM

## 2024-09-11 RX ORDER — ESOMEPRAZOLE MAGNESIUM 40 MG/1
40 CAPSULE, DELAYED RELEASE ORAL DAILY
Qty: 90 CAPSULE | Refills: 0 | Status: SHIPPED | OUTPATIENT
Start: 2024-09-11

## 2024-09-11 NOTE — TELEPHONE ENCOUNTER
Reason for call:   [x] Refill   [] Prior Auth  [x] Other: dispense 180 at a time, patient pays out of pocket    Office:   [x] PCP/Provider - kristan page  [] Specialty/Provider -     Medication:     Pharmacy: [facility] [place] [place] - [address]     Does the patient have enough for 3 days?   [x] Yes   [] No - Send as HP to POD

## 2024-09-17 ENCOUNTER — TELEPHONE (OUTPATIENT)
Age: 74
End: 2024-09-17

## 2024-10-21 ENCOUNTER — TELEPHONE (OUTPATIENT)
Dept: OBGYN CLINIC | Facility: HOSPITAL | Age: 74
End: 2024-10-21

## 2024-11-11 ENCOUNTER — APPOINTMENT (OUTPATIENT)
Age: 74
End: 2024-11-11
Payer: MEDICARE

## 2024-11-11 ENCOUNTER — OFFICE VISIT (OUTPATIENT)
Dept: UROLOGY | Facility: MEDICAL CENTER | Age: 74
End: 2024-11-11
Payer: MEDICARE

## 2024-11-11 ENCOUNTER — OFFICE VISIT (OUTPATIENT)
Age: 74
End: 2024-11-11
Payer: MEDICARE

## 2024-11-11 VITALS
BODY MASS INDEX: 36.73 KG/M2 | SYSTOLIC BLOOD PRESSURE: 122 MMHG | HEIGHT: 69 IN | DIASTOLIC BLOOD PRESSURE: 70 MMHG | HEART RATE: 85 BPM | WEIGHT: 248 LBS | OXYGEN SATURATION: 95 %

## 2024-11-11 VITALS
SYSTOLIC BLOOD PRESSURE: 138 MMHG | HEIGHT: 68 IN | DIASTOLIC BLOOD PRESSURE: 82 MMHG | BODY MASS INDEX: 36.83 KG/M2 | WEIGHT: 243 LBS

## 2024-11-11 DIAGNOSIS — Z87.440 HISTORY OF RECURRENT UTIS: ICD-10-CM

## 2024-11-11 DIAGNOSIS — M25.562 LEFT KNEE PAIN, UNSPECIFIED CHRONICITY: ICD-10-CM

## 2024-11-11 DIAGNOSIS — E66.01 OBESITY, MORBID (HCC): ICD-10-CM

## 2024-11-11 DIAGNOSIS — M17.0 PRIMARY OSTEOARTHRITIS OF BOTH KNEES: Primary | ICD-10-CM

## 2024-11-11 DIAGNOSIS — M25.561 RIGHT KNEE PAIN, UNSPECIFIED CHRONICITY: ICD-10-CM

## 2024-11-11 DIAGNOSIS — M25.562 CHRONIC PAIN OF BOTH KNEES: ICD-10-CM

## 2024-11-11 DIAGNOSIS — G89.29 CHRONIC PAIN OF BOTH KNEES: ICD-10-CM

## 2024-11-11 DIAGNOSIS — N40.0 BENIGN PROSTATIC HYPERPLASIA, UNSPECIFIED WHETHER LOWER URINARY TRACT SYMPTOMS PRESENT: Primary | ICD-10-CM

## 2024-11-11 DIAGNOSIS — M25.561 CHRONIC PAIN OF BOTH KNEES: ICD-10-CM

## 2024-11-11 LAB — POST-VOID RESIDUAL VOLUME, ML POC: 53 ML

## 2024-11-11 PROCEDURE — 99213 OFFICE O/P EST LOW 20 MIN: CPT | Performed by: UROLOGY

## 2024-11-11 PROCEDURE — 51798 US URINE CAPACITY MEASURE: CPT | Performed by: UROLOGY

## 2024-11-11 PROCEDURE — 99214 OFFICE O/P EST MOD 30 MIN: CPT | Performed by: STUDENT IN AN ORGANIZED HEALTH CARE EDUCATION/TRAINING PROGRAM

## 2024-11-11 PROCEDURE — 73564 X-RAY EXAM KNEE 4 OR MORE: CPT

## 2024-11-11 PROCEDURE — 77073 BONE LENGTH STUDIES: CPT

## 2024-11-11 NOTE — PROGRESS NOTES
Knee New Office Note    Assessment:     1. Right knee pain, unspecified chronicity    2. Left knee pain, unspecified chronicity    3. Chronic pain of both knees    4. Primary osteoarthritis of both knees        Plan:     Problem List Items Addressed This Visit    None  Visit Diagnoses       Right knee pain, unspecified chronicity    -  Primary    Relevant Orders    XR knee 4+ vw right injury    XR bone length (scanogram)    MRI knee right  wo contrast    Left knee pain, unspecified chronicity        Relevant Orders    XR knee 4+ vw left injury    XR bone length (scanogram)    Chronic pain of both knees        Primary osteoarthritis of both knees               Findings today are consistent with bilateral knee mild osteoarthritis. Imaging and prognosis was reviewed with the patient today. Discussed treatment options including continued observation, low impact exercises, bracing, anti-inflammatories, physical therapy, cortisone injection, visco injection, versus surgical intervention. MRI of the right knee ordered for further evaluation of possible meniscal pathology due to mechanical symptoms, preserved joint spaces and lack of relief with conservative measures. Follow-up once testing is complete.    Subjective:     Patient ID: Heath Dumas is a 74 y.o. male.  Chief Complaint:  HPI:  74 y.o. male presents to the office for evaluation of bilateral knee pain.  He was referred to us by Dr. Higgins. He is experiencing bilateral anterior knee pain, right worse than left. His pain worsens with activities including transitioning from sit to stand and going up stairs. He also notes buckling and cracking. He has tried cortisone injections in the past, first injections lasted 7 months and the most recent ones only lasted roughly 1 month. He has also tried visco supplementation with mild relief.    Allergy:  Allergies   Allergen Reactions    Pollen Extract Allergic Rhinitis     Medications:  all current active meds have been  reviewed  Past Medical History:  Past Medical History:   Diagnosis Date    AC joint arthropathy     right    Allergic 2018    Arthritis 2020    BPH (benign prostatic hyperplasia)     Carpal tunnel syndrome     bilateral    Chronic prostatitis     last assessed: 3/1/2014    Chronic UTI (urinary tract infection)     Colon polyp     Diverticulitis     twice    Ganglion     last assessed: 10/7/2013    GERD (gastroesophageal reflux disease)     GERD without esophagitis     Hyperlipidemia     Impingement syndrome of right shoulder     last assessed: 3/22/2016    Irritable bowel syndrome July 2022    Left inguinal hernia     description: repaired by Dr. 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    Pneumonia 2019    Pneumonia of left upper lobe due to infectious organism     last assessed: 2/8/2016    Sexual dysfunction     Stomach disorder     Urinary tract infection 2020    Wears glasses      Past Surgical History:  Past Surgical History:   Procedure Laterality Date    ABDOMINAL SURGERY  2010    COLONOSCOPY      COLONOSCOPY      CYSTOSCOPY      Diagnostic last assessed: 6/12/2014    CYSTOSTOMY      urethral stricture    HERNIA REPAIR      mesh left inguinal    NEUROPLASTY / TRANSPOSITION MEDIAN NERVE AT CARPAL TUNNEL      [place] CYSTO INSERTION TRANSPROSTATIC IMPLANT SINGLE N/A 06/23/2020    Procedure: CYSTOSCOPY, URETHRAL DILATION,  WITH INSERTION UROLIFT;  Surgeon: Nabeel Silveira MD;  [facility];  Service: Urology    WA ENDOSCOPIC PLANTAR FASCIOTOMY Left 11/18/2016    Procedure: PLANTAR FASCIOTOMY;  Surgeon: Osmin Soto DPM;  [facility];  Service: Podiatry    WA EXCISION GANGLION WRIST DORSAL/VOLAR PRIMARY Left 01/24/2018    Procedure: WRIST RESECTION VOLAR GANGLION CYST;  Surgeon: Jessa Jackson MD;  [facility];  Service: Orthopedics    WA NEUROPLASTY &/TRANSPOS MEDIAN NRV CARPAL TUNNE Right 08/24/2016    Procedure: CARPAL TUNNEL RELEASE ;  Surgeon: Jessa Jackson MD;  [facility];   Service: Orthopedics    [place] NEUROPLASTY &/TRANSPOS MEDIAN NRV CARPAL TUNNE Left 01/24/2018    Procedure: RELEASE CARPAL TUNNEL;  Surgeon: Jessa Jackson MD;  [facility];  Service: Orthopedics     Family History:  Family History   Problem Relation Age of Onset    Cancer Father         bladder    Colon polyps Neg Hx     Colon cancer Neg Hx      Social History:  Social History     Substance and Sexual Activity   Alcohol Use Yes    Alcohol/week: 3.0 standard drinks of alcohol    Types: 3 Standard drinks or equivalent per week    Comment: social and denies alcohol use causing problems      Social History     Substance and Sexual Activity   Drug Use No     Social History     Tobacco Use   Smoking Status Former    Current packs/day: 0.00    Average packs/day: 1 pack/day for 44.3 years (44.3 ttl pk-yrs)    Types: Cigarettes    Start date: 9/15/1969    Quit date: 1/1/2014    Years since quitting: 10.8   Smokeless Tobacco Former    Quit date: 1/1/2023         ROS:  General: Per HPI  Skin: Negative, except if noted below  HEENT: Negative  Respiratory: Negative  Cardiovascular: Negative  Gastrointestinal: Negative  Urinary: Negative  Vascular: Negative  Musculoskeletal: Positive per HPI   Neurologic: Positive per HPI  Endocrine: Negative    Objective:  BP Readings from Last 1 Encounters:   11/11/24 138/82      Wt Readings from Last 1 Encounters:   11/11/24 110 kg (243 lb)        Respiratory:   non-labored respirations    Lymphatics:  no palpable lymph nodes    Gait:   antalgic    Neurologic:   Alert and oriented times 3  Patient with normal sensation except as noted below  Deep tendon reflexes 2+ except as noted in MSK exam    Bilateral Lower Extremity:  Left Knee:      Inspection:  skin intact    Overall limb alignment neutral    Effusion: none    ROM 0-120 with pain    Extensor Lag: none    Palpation: mild medial Joint line tenderness to palpation    AP Stability at 90 deg stable    M/L stability in full extension  "stable    M/L stability in midflexion stable    Motor: 5/5 Q/HS/TA/GS/P    Pulses: 2+ DP / 2+ PT    SILT DP/SP/S/S/TN    Right knee:     Inspection:  skin intact    Overall limb alignment neutral    Effusion: none    ROM 0-120 with pain    Extensor Lag: none    Palpation: significant medial joint line tenderness to palpation    AP Stability at 90 deg stable    + Medial [place]     M/L stability in full extension stable    M/L stability in midflexion stable    Motor: 5/5 Q/HS/TA/GS/P    Pulses: 2+ DP / 2+ PT    SILT DP/SP/S/S/TN    Imaging:  My interpretation XR AP scanogram/AP bilateral knee/lateral/dean/sunrise bilateral knees: mild joint space narrowing, subchondral sclerosis, subchondral cysts, osteophyte formation. No fracture or dislocation.     BMI:   Estimated body mass index is 36.95 kg/m² as calculated from the following:    Height as of this encounter: 5' 8\" (1.727 m).    Weight as of this encounter: 110 kg (243 lb).  BSA:   Estimated body surface area is 2.22 meters squared as calculated from the following:    Height as of this encounter: 5' 8\" (1.727 m).    Weight as of this encounter: 110 kg (243 lb).           Scribe Attestation      I,:  Belem Rizo am acting as a scribe while in the presence of the attending physician.:       I,:  Michael Perez, DO personally performed the services described in this documentation    as scribed in my presence.:              "

## 2024-11-11 NOTE — ASSESSMENT & PLAN NOTE
BPH s/p Urolift in 2020  No voiding complaints at this time  PVR 53 cc   - continue to monitor urinary symptoms  - no need for BPH medication at this time  - will get interval PSA with PCP on routine blood work

## 2024-11-11 NOTE — PROGRESS NOTES
11/11/2024    Heath Dumas  1950  [id number]    1. Benign prostatic hyperplasia, unspecified whether lower urinary tract symptoms present  Assessment & Plan:  BPH s/p Urolift in 2020  No voiding complaints at this time  PVR 53 cc   - continue to monitor urinary symptoms  - no need for BPH medication at this time  - will get interval PSA with PCP on routine blood work  Orders:  -     POCT Measure PVR  -     Urinalysis with microscopic; Future  2. History of recurrent UTIs  3. Obesity, morbid (HCC)       History of Present Illness  74 y.o. male with a history of BPH s/p Urolift with Dr Silveira in 2020    Not currently taking any BPH medications  No bothersome voiding symptoms post Urolift and treatment of urethral stricture  No hematuria, no dysuria    Had Peyronie's disease s/p plication procedure at [facility]      AUA Symptom Score  AUA SYMPTOM SCORE      Flowsheet Row Most Recent Value   AUA SYMPTOM SCORE    How often have you had a sensation of not emptying your bladder completely after you finished urinating? 2 (P)     How often have you had to urinate again less than two hours after you finished urinating? 2 (P)     How often have you found you stopped and started again several times when you urinate? 2 (P)     How often have you found it difficult to postpone urination? 2 (P)     How often have you had a weak urinary stream? 2 (P)     How often have you had to push or strain to begin urination? 2 (P)     How many times did you most typically get up to urinate from the time you went to bed at night until the time you got up in the morning? 1 (P)     Quality of Life: If you were to spend the rest of your life with your urinary condition just the way it is now, how would you feel about that? 2 (P)     AUA SYMPTOM SCORE 13 (P)              Review of Systems   All other systems reviewed and are negative.      Past Medical History  Past Medical History:   Diagnosis Date    AC joint arthropathy     right     Allergic 2018    Arthritis 2020    BPH (benign prostatic hyperplasia)     Carpal tunnel syndrome     bilateral    Chronic prostatitis     last assessed: 3/1/2014    Chronic UTI (urinary tract infection)     Colon polyp     Diverticulitis     twice    Ganglion     last assessed: 10/7/2013    GERD (gastroesophageal reflux disease)     GERD without esophagitis     Hyperlipidemia     Impingement syndrome of right shoulder     last assessed: 3/22/2016    Irritable bowel syndrome July 2022    Left inguinal hernia     description: repaired by Dr. 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    Pneumonia 2019    Pneumonia of left upper lobe due to infectious organism     last assessed: 2/8/2016    Sexual dysfunction     Stomach disorder     Urinary tract infection 2020    Wears glasses        Past Social History  Past Surgical History:   Procedure Laterality Date    ABDOMINAL SURGERY  2010    COLONOSCOPY      COLONOSCOPY      CYSTOSCOPY      Diagnostic last assessed: 6/12/2014    CYSTOSTOMY      urethral stricture    HERNIA REPAIR      mesh left inguinal    NEUROPLASTY / TRANSPOSITION MEDIAN NERVE AT CARPAL TUNNEL      IA CYSTO INSERTION TRANSPROSTATIC IMPLANT SINGLE N/A 06/23/2020    Procedure: CYSTOSCOPY, URETHRAL DILATION,  WITH INSERTION UROLIFT;  Surgeon: Nabeel Silveira MD;  [facility];  Service: Urology    IA ENDOSCOPIC PLANTAR FASCIOTOMY Left 11/18/2016    Procedure: PLANTAR FASCIOTOMY;  Surgeon: Osmin Soto DPM;  Location: [facility];  Service: Podiatry    IA EXCISION GANGLION WRIST DORSAL/VOLAR PRIMARY Left 01/24/2018    Procedure: WRIST RESECTION VOLAR GANGLION CYST;  Surgeon: Jessa Jackson MD;  Location: [facility];  Service: Orthopedics    IA NEUROPLASTY &/TRANSPOS MEDIAN NRV CARPAL TUNNE Right 08/24/2016    Procedure: CARPAL TUNNEL RELEASE ;  Surgeon: Jessa Jackson MD;  [facility];  Service: Orthopedics    IA NEUROPLASTY &/TRANSPOS MEDIAN NRV CARPAL TUNNE Left 01/24/2018    Procedure: RELEASE  CARPAL TUNNEL;  Surgeon: Jessa Jackson MD;  [facility];  Service: Orthopedics       Past Family History  Family History   Problem Relation Age of Onset    Cancer Father         bladder    Colon polyps Neg Hx     Colon cancer Neg Hx        Past Social history  Social History     Socioeconomic History    Marital status:      Spouse name: Not on file    Number of children: 1    Years of education: Not on file    Highest education level: Not on file   Occupational History    Occupation: Working full time    Tobacco Use    Smoking status: Former     Current packs/day: 0.00     Average packs/day: 1 pack/day for 44.3 years (44.3 ttl pk-yrs)     Types: Cigarettes     Start date: 9/15/1969     Quit date: 1/1/2014     Years since quitting: 10.8    Smokeless tobacco: Former     Quit date: 1/1/2023   Vaping Use    Vaping status: Never Used   Substance and Sexual Activity    Alcohol use: Yes     Alcohol/week: 3.0 standard drinks of alcohol     Types: 3 Standard drinks or equivalent per week     Comment: social and denies alcohol use causing problems     Drug use: No    Sexual activity: Not Currently     Partners: Male   Other Topics Concern    Not on file   Social History Narrative    Not on file     Social Determinants of Health     Financial Resource Strain: Low Risk  (8/5/2024)    Received from [facility]    Overall Financial Resource Strain (CARDIA)     Difficulty of Paying Living Expenses: Not hard at all   Food Insecurity: No Food Insecurity (8/5/2024)    Received from [facility]    Hunger Vital Sign     Worried About Running Out of Food in the Last Year: Never true     Ran Out of Food in the Last Year: Never true   Transportation Needs: No Transportation Needs (8/5/2024)    Received from [facility]    PRAPARE - Transportation     Lack of Transportation (Medical): No     Lack of Transportation (Non-Medical): No   Physical Activity: Inactive (6/29/2020)     Exercise Vital Sign     Days of Exercise per Week: 0 days     Minutes of Exercise per Session: 0 min   Stress: No Stress Concern Present (6/29/2020)    [employer] - Occupational Stress Questionnaire     Feeling of Stress : Not at all   Social Connections: Socially Isolated (6/29/2020)    Social Connection and Isolation Panel [NHANES]     Frequency of Communication with Friends and Family: Twice a week     Frequency of Social Gatherings with Friends and Family: Once a week     Attends Zoroastrianism Services: Never     Active Member of Clubs or Organizations: No     Attends Club or Organization Meetings: Never     Marital Status:    Intimate Partner Violence: Not At Risk (8/5/2024)    Received from [facility]    Humiliation, Afraid, Rape, and Kick questionnaire     Fear of Current or Ex-Partner: No     Emotionally Abused: No     Physically Abused: No     Sexually Abused: No   Housing Stability: Unknown (8/5/2024)    Received from [facility]    Housing Stability Vital Sign     Unable to Pay for Housing in the Last Year: No     Number of Times Moved in the Last Year: Not on file     Homeless in the Last Year: No       Current Medications  Current Outpatient Medications   Medication Sig Dispense Refill    Cholecalciferol (VITAMIN D PO) Take by mouth      Coenzyme Q10 (CO Q 10 PO) Take 200 mg by mouth      esomeprazole (NexIUM) 40 MG capsule Take 1 capsule (40 mg total) by mouth daily 90 capsule 0    levocetirizine (XYZAL) 5 MG tablet Take 5 mg by mouth every evening      linaCLOtide (Linzess) 145 MCG CAPS Take 1 capsule (145 mcg total) by mouth daily 30 capsule 2    polyethylene glycol (GLYCOLAX) 17 GM/SCOOP powder Take 17 g by mouth daily 850 g 3    simvastatin (ZOCOR) 40 mg tablet TAKE 1 TABLET BY MOUTH ONCE DAILY AT BEDTIME 90 tablet 3    albuterol (PROVENTIL HFA,VENTOLIN HFA) 90 mcg/act inhaler Inhale 2 puffs every 6 (six) hours as needed for  "wheezing (Patient not taking: Reported on 11/11/2024) 18 g 3     No current facility-administered medications for this visit.       Allergies  Allergies   Allergen Reactions    Pollen Extract Allergic Rhinitis       Past Medical History, Social History, Family History, medications and allergies were reviewed.    Vitals  Vitals:    11/11/24 1048   BP: 122/70   BP Location: Left arm   Patient Position: Sitting   Cuff Size: Standard   Pulse: 85   SpO2: 95%   Weight: 112 kg (248 lb)   Height: 5' 9\" (1.753 m)       Physical Exam  Constitutional:       Appearance: Normal appearance. He is normal weight.   HENT:      Head: Normocephalic.      Nose: Nose normal. No congestion.   Eyes:      Conjunctiva/sclera: Conjunctivae normal.   Cardiovascular:      Rate and Rhythm: Normal rate.   Pulmonary:      Effort: Pulmonary effort is normal. No respiratory distress.   Abdominal:      General: Abdomen is flat. There is no distension.      Palpations: Abdomen is soft.      Tenderness: There is no right CVA tenderness or left CVA tenderness.   Genitourinary:     Comments: PRABHAKAR deferred by patient  Musculoskeletal:      Comments: Normal gait   Skin:     General: Skin is warm and dry.   Neurological:      General: No focal deficit present.      Mental Status: He is alert and oriented to person, place, and time.   Psychiatric:         Mood and Affect: Mood normal.           Results  Lab Results   Component Value Date    PSA 0.6 06/01/2022    PSA 0.6 03/03/2021    PSA 1.0 11/09/2020     Lab Results   Component Value Date    GLUCOSE 105 03/11/2015    CALCIUM 9.5 11/30/2022     03/11/2015    K 4.2 11/30/2022    CO2 29 11/30/2022     11/30/2022    BUN 14 11/30/2022    CREATININE 0.94 11/30/2022     Lab Results   Component Value Date    WBC 10.36 (H) 04/26/2023    HGB 15.3 04/26/2023    HCT 48.1 04/26/2023    MCV 98 04/26/2023     04/26/2023       Office Urine Dip  Recent Results (from the past 1 hour(s))   POCT Measure PVR "    Collection Time: 11/11/24 11:02 AM   Result Value Ref Range    POST-VOID RESIDUAL VOLUME, ML POC 53 mL   ]

## 2024-11-25 ENCOUNTER — OFFICE VISIT (OUTPATIENT)
Dept: FAMILY MEDICINE CLINIC | Facility: CLINIC | Age: 74
End: 2024-11-25
Payer: MEDICARE

## 2024-11-25 VITALS
OXYGEN SATURATION: 96 % | WEIGHT: 243 LBS | HEIGHT: 68 IN | DIASTOLIC BLOOD PRESSURE: 78 MMHG | BODY MASS INDEX: 36.83 KG/M2 | RESPIRATION RATE: 18 BRPM | SYSTOLIC BLOOD PRESSURE: 134 MMHG | TEMPERATURE: 98.5 F | HEART RATE: 95 BPM

## 2024-11-25 DIAGNOSIS — Z79.899 HIGH RISK MEDICATION USE: ICD-10-CM

## 2024-11-25 DIAGNOSIS — R73.01 IMPAIRED FASTING GLUCOSE: ICD-10-CM

## 2024-11-25 DIAGNOSIS — Z12.5 SCREENING FOR PROSTATE CANCER: ICD-10-CM

## 2024-11-25 DIAGNOSIS — E78.5 HYPERLIPIDEMIA, UNSPECIFIED HYPERLIPIDEMIA TYPE: ICD-10-CM

## 2024-11-25 DIAGNOSIS — Z00.00 MEDICARE ANNUAL WELLNESS VISIT, SUBSEQUENT: Primary | ICD-10-CM

## 2024-11-25 PROBLEM — J43.2 CENTRILOBULAR EMPHYSEMA (HCC): Status: RESOLVED | Noted: 2019-11-07 | Resolved: 2024-11-25

## 2024-11-25 PROBLEM — M75.82 TENDINITIS OF LEFT ROTATOR CUFF: Status: RESOLVED | Noted: 2018-02-21 | Resolved: 2024-11-25

## 2024-11-25 PROBLEM — Z16.24 MULTIPLE DRUG RESISTANT ORGANISM (MDRO) CULTURE POSITIVE: Status: RESOLVED | Noted: 2023-09-09 | Resolved: 2024-11-25

## 2024-11-25 PROCEDURE — G0439 PPPS, SUBSEQ VISIT: HCPCS | Performed by: FAMILY MEDICINE

## 2024-11-25 NOTE — PROGRESS NOTES
Name: Heath Dumas      : 1950      MRN: [id number]  Encounter Provider: Chase Denise DO  Encounter Date: 2024   Encounter department: [facility]    Assessment & Plan  Medicare annual wellness visit, subsequent  Medicare wellness visit completed  Labs ordered  He is up-to-date rest of health maintenance he overall is doing well and can follow-up with me in 1 year or sooner if needed  Declining vaccines today       Hyperlipidemia, unspecified hyperlipidemia type    Orders:    Lipid panel; Future    High risk medication use    Orders:    CBC and differential; Future    Comprehensive metabolic panel; Future    Screening for prostate cancer    Orders:    PSA, Total Screen; Future    UA (URINE) with reflex to Scope; Future    Impaired fasting glucose    Orders:    Comprehensive metabolic panel; Future    Hemoglobin A1C; Future      Depression Screening and Follow-up Plan: Patient was screened for depression during today's encounter. They screened negative with a PHQ-2 score of 0.      Preventive health issues were discussed with patient, and age appropriate screening tests were ordered as noted in patient's After Visit Summary. Personalized health advice and appropriate referrals for health education or preventive services given if needed, as noted in patient's After Visit Summary.    History of Present Illness     Patient presents today for Medicare wellness visit  Overall he is doing well with no acute complaints       Patient Care Team:  Chase Denise DO as PCP - General (Family Medicine)  MD Jessa French MD Kristofer Matullo, MD    Review of Systems   Constitutional: Negative.    HENT: Negative.     Eyes: Negative.    Respiratory: Negative.     Cardiovascular: Negative.    Gastrointestinal: Negative.    Endocrine: Negative.    Genitourinary: Negative.    Musculoskeletal: Negative.    Skin: Negative.    Allergic/Immunologic: Negative.    Neurological: Negative.     Hematological: Negative.    Psychiatric/Behavioral: Negative.     All other systems reviewed and are negative.    Medical History Reviewed by provider this encounter:       Annual Wellness Visit Questionnaire   Heath is here for his Subsequent Wellness visit. Last Medicare Wellness visit information reviewed, patient interviewed and updates made to the record today.      Health Risk Assessment:   Patient rates overall health as excellent. Patient feels that their physical health rating is much better. Patient is very satisfied with their life. Eyesight was rated as slightly worse. Hearing was rated as same. Patient feels that their emotional and mental health rating is same. Patients states they are never, rarely angry. Patient states they are never, rarely unusually tired/fatigued. Pain experienced in the last 7 days has been none. Patient states that he has experienced no weight loss or gain in last 6 months.     Depression Screening:   PHQ-2 Score: 0      Fall Risk Screening:   In the past year, patient has experienced: no history of falling in past year      Home Safety:  Patient has trouble with stairs inside or outside of their home. Patient has working smoke alarms and has working carbon monoxide detector. Home safety hazards include: none.     Nutrition:   Current diet is Regular.     Medications:   Patient is currently taking over-the-counter supplements. OTC medications include: see medication list. Patient is able to manage medications.     Activities of Daily Living (ADLs)/Instrumental Activities of Daily Living (IADLs):   Walk and transfer into and out of bed and chair?: Yes  Dress and groom yourself?: Yes    Bathe or shower yourself?: Yes    Feed yourself? Yes  Do your laundry/housekeeping?: Yes  Manage your money, pay your bills and track your expenses?: Yes  Make your own meals?: Yes    Do your own shopping?: Yes    Previous Hospitalizations:   Any hospitalizations or ED visits within the last 12  months?: No      Advance Care Planning:   Living will: Yes    Durable POA for healthcare: Yes    Advanced directive: Yes    Advanced directive counseling given: Yes    End of Life Decisions reviewed with patient: Yes    Provider agrees with end of life decisions: Yes      Cognitive Screening:   Provider or family/friend/caregiver concerned regarding cognition?: No    PREVENTIVE SCREENINGS      Cardiovascular Screening:    General: History Lipid Disorder and Screening Current      Diabetes Screening:     General: Screening Current      Colorectal Cancer Screening:     General: Screening Current      Prostate Cancer Screening:    General: Risks and Benefits Discussed      Osteoporosis Screening:    General: Screening Not Indicated      Abdominal Aortic Aneurysm (AAA) Screening:    Risk factors include: age between 65-76 yo and tobacco use        General: Screening Not Indicated      Lung Cancer Screening:     General: Screening Current      Hepatitis C Screening:    General: Screening Current    Screening, Brief Intervention, and Referral to Treatment (SBIRT)    Screening  Typical number of drinks in a day: 3  Typical number of drinks in a week: 0  Interpretation: Low risk drinking behavior.    AUDIT-C Screenin) How often did you have a drink containing alcohol in the past year? 4 or more times a week  2) How many drinks did you have on a typical day when you were drinking in the past year? 3 to 4  3) How often did you have 6 or more drinks on one occasion in the past year? never    AUDIT-C Score: 4  Interpretation: Score 4-12 (male): POSITIVE screen for alcohol misuse    AUDIT Screenin) How often during the last year have you found that you were not able to stop drinking once you had started? 0 - never  5) How often during the last year have you failed to do what was normally expected from you because of drinking? 0 - never  6) How often during the last year have you needed a first drink in the morning to  get yourself going after a heavy drinking session? 0 - never  7) How often during the last year have you had a feeling of guilt or remorse after drinking? 0 - never  8) How often during the last year have you been unable to remember what happened the night before because you had been drinking? 0 - never  9) Have you or someone else been injured as a result of your drinking? 0 - no  10) Has a relative or friend or a doctor or another health worker been concerned about your drinking or suggested you cut down? 0 - no    AUDIT Score: 4  Interpretation: Low risk alcohol consumption    Single Item Drug Screening:  How often have you used an illegal drug (including marijuana) or a prescription medication for non-medical reasons in the past year? never    Single Item Drug Screen Score: 0  Interpretation: Negative screen for possible drug use disorder    Brief Intervention  Alcohol & drug use screenings were reviewed. No concerns regarding substance use disorder identified.     Other Counseling Topics:   Car/seat belt/driving safety, skin self-exam, sunscreen and regular weightbearing exercise and calcium and vitamin D intake.     Social Drivers of Health     Financial Resource Strain: Low Risk  (8/5/2024)    Received from [facility]    Overall Financial Resource Strain (CARDIA)     Difficulty of Paying Living Expenses: Not hard at all   Food Insecurity: No Food Insecurity (11/24/2024)    Hunger Vital Sign     Worried About Running Out of Food in the Last Year: Never true     Ran Out of Food in the Last Year: Never true   Transportation Needs: No Transportation Needs (11/24/2024)    PRAPARE - Transportation     Lack of Transportation (Medical): No     Lack of Transportation (Non-Medical): No   Housing Stability: Low Risk  (11/24/2024)    Housing Stability Vital Sign     Unable to Pay for Housing in the Last Year: No     Number of Times Moved in the Last Year: 0     Homeless in the Last Year: No   Utilities:  "Not At Risk (11/24/2024)    [facility] Utilities     Threatened with loss of utilities: No     No results found.    Objective   /78   Pulse 95   Temp 98.5 °F (36.9 °C) (Tympanic)   Resp 18   Ht 5' 7.9\" (1.725 m)   Wt 110 kg (243 lb)   SpO2 96%   BMI 37.06 kg/m²     Physical Exam  Vitals and nursing note reviewed.   Constitutional:       Appearance: Normal appearance. He is well-developed.   HENT:      Head: Normocephalic.      Right Ear: External ear normal.      Left Ear: External ear normal.      Nose: Nose normal.   Eyes:      Conjunctiva/sclera: Conjunctivae normal.      Pupils: Pupils are equal, round, and reactive to light.   Cardiovascular:      Rate and Rhythm: Normal rate and regular rhythm.      Heart sounds: Normal heart sounds.   Pulmonary:      Effort: Pulmonary effort is normal.      Breath sounds: Normal breath sounds.   Abdominal:      General: Bowel sounds are normal.      Palpations: Abdomen is soft.   Musculoskeletal:         General: Normal range of motion.      Cervical back: Normal range of motion and neck supple.   Skin:     General: Skin is warm and dry.   Neurological:      General: No focal deficit present.      Mental Status: He is alert and oriented to person, place, and time.   Psychiatric:         Behavior: Behavior normal.         Thought Content: Thought content normal.         Judgment: Judgment normal.         "

## 2024-11-25 NOTE — PATIENT INSTRUCTIONS
Medicare Preventive Visit Patient Instructions  Thank you for completing your Welcome to Medicare Visit or Medicare Annual Wellness Visit today. Your next wellness visit will be due in one year (11/26/2025).  The screening/preventive services that you may require over the next 5-10 years are detailed below. Some tests may not apply to you based off risk factors and/or age. Screening tests ordered at today's visit but not completed yet may show as past due. Also, please note that scanned in results may not display below.  Preventive Screenings:  Service Recommendations Previous Testing/Comments   Colorectal Cancer Screening  Colonoscopy    Fecal Occult Blood Test (FOBT)/Fecal Immunochemical Test (FIT)  Fecal DNA/Cologuard Test  Flexible Sigmoidoscopy Age: 45-75 years old   Colonoscopy: every 10 years (May be performed more frequently if at higher risk)  OR  FOBT/FIT: every 1 year  OR  Cologuard: every 3 years  OR  Sigmoidoscopy: every 5 years  Screening may be recommended earlier than age 45 if at higher risk for colorectal cancer. Also, an individualized decision between you and your healthcare provider will decide whether screening between the ages of 76-85 would be appropriate. Colonoscopy: 01/27/2021  FOBT/FIT: Not on file  Cologuard: Not on file  Sigmoidoscopy: Not on file          Prostate Cancer Screening Individualized decision between patient and health care provider in men between ages of 55-69   Medicare will cover every 12 months beginning on the day after your 50th birthday PSA: 0.6 ng/mL           Hepatitis C Screening Once for adults born between 1945 and 1965  More frequently in patients at high risk for Hepatitis C Hep C Antibody: 08/16/2018        Diabetes Screening 1-2 times per year if you're at risk for diabetes or have pre-diabetes Fasting glucose: 100 mg/dL (11/30/2022)  A1C: No results in last 5 years (No results in last 5 years)      Cholesterol Screening Once every 5 years if you don't have  a lipid disorder. May order more often based on risk factors. Lipid panel: 11/30/2022         Other Preventive Screenings Covered by Medicare:  Abdominal Aortic Aneurysm (AAA) Screening: covered once if your at risk. You're considered to be at risk if you have a family history of AAA or a male between the age of 65-75 who smoking at least 100 cigarettes in your lifetime.  Lung Cancer Screening: covers low dose CT scan once per year if you meet all of the following conditions: (1) Age 55-77; (2) No signs or symptoms of lung cancer; (3) Current smoker or have quit smoking within the last 15 years; (4) You have a tobacco smoking history of at least 20 pack years (packs per day x number of years you smoked); (5) You get a written order from a healthcare provider.  Glaucoma Screening: covered annually if you're considered high risk: (1) You have diabetes OR (2) Family history of glaucoma OR (3)  aged 50 and older OR (4)  American aged 65 and older  Osteoporosis Screening: covered every 2 years if you meet one of the following conditions: (1) Have a vertebral abnormality; (2) On glucocorticoid therapy for more than 3 months; (3) Have primary hyperparathyroidism; (4) On osteoporosis medications and need to assess response to drug therapy.  HIV Screening: covered annually if you're between the age of 15-65. Also covered annually if you are younger than 15 and older than 65 with risk factors for HIV infection. For pregnant patients, it is covered up to 3 times per pregnancy.    Immunizations:  Immunization Recommendations   Influenza Vaccine Annual influenza vaccination during flu season is recommended for all persons aged >= 6 months who do not have contraindications   Pneumococcal Vaccine   * Pneumococcal conjugate vaccine = PCV13 (Prevnar 13), PCV15 (Vaxneuvance), PCV20 (Prevnar 20)  * Pneumococcal polysaccharide vaccine = PPSV23 (Pneumovax) Adults 19-63 yo with certain risk factors or if 65+  yo  If never received any pneumonia vaccine: recommend Prevnar 20 (PCV20)  Give PCV20 if previously received 1 dose of PCV13 or PPSV23   Hepatitis B Vaccine 3 dose series if at intermediate or high risk (ex: diabetes, end stage renal disease, liver disease)   Respiratory syncytial virus (RSV) Vaccine - COVERED BY MEDICARE PART D  * RSVPreF3 (Arexvy) CDC recommends that adults 60 years of age and older may receive a single dose of RSV vaccine using shared clinical decision-making (SCDM)   Tetanus (Td) Vaccine - COST NOT COVERED BY MEDICARE PART B Following completion of primary series, a booster dose should be given every 10 years to maintain immunity against tetanus. Td may also be given as tetanus wound prophylaxis.   Tdap Vaccine - COST NOT COVERED BY MEDICARE PART B Recommended at least once for all adults. For pregnant patients, recommended with each pregnancy.   Shingles Vaccine (Shingrix) - COST NOT COVERED BY MEDICARE PART B  2 shot series recommended in those 19 years and older who have or will have weakened immune systems or those 50 years and older     Health Maintenance Due:      Topic Date Due   • Lung Cancer Screening  09/09/2025   • Colorectal Cancer Screening  01/27/2031   • Hepatitis C Screening  Completed     Immunizations Due:      Topic Date Due   • Hepatitis A Vaccine (1 of 2 - Risk 2-dose series) Never done   • Hepatitis B Vaccine (1 of 3 - Risk 3-dose series) Never done   • Influenza Vaccine (1) Never done   • COVID-19 Vaccine (3 - 2024-25 season) 09/01/2024     Advance Directives   What are advance directives?  Advance directives are legal documents that state your wishes and plans for medical care. These plans are made ahead of time in case you lose your ability to make decisions for yourself. Advance directives can apply to any medical decision, such as the treatments you want, and if you want to donate organs.   What are the types of advance directives?  There are many types of advance  directives, and each state has rules about how to use them. You may choose a combination of any of the following:  Living will:  This is a written record of the treatment you want. You can also choose which treatments you do not want, which to limit, and which to stop at a certain time. This includes surgery, medicine, IV fluid, and tube feedings.   Durable power of  for healthcare (DPAHC):  This is a written record that states who you want to make healthcare choices for you when you are unable to make them for yourself. This person, called a proxy, is usually a family member or a friend. You may choose more than 1 proxy.  Do not resuscitate (DNR) order:  A DNR order is used in case your heart stops beating or you stop breathing. It is a request not to have certain forms of treatment, such as CPR. A DNR order may be included in other types of advance directives.  Medical directive:  This covers the care that you want if you are in a coma, near death, or unable to make decisions for yourself. You can list the treatments you want for each condition. Treatment may include pain medicine, surgery, blood transfusions, dialysis, IV or tube feedings, and a ventilator (breathing machine).  Values history:  This document has questions about your views, beliefs, and how you feel and think about life. This information can help others choose the care that you would choose.  Why are advance directives important?  An advance directive helps you control your care. Although spoken wishes may be used, it is better to have your wishes written down. Spoken wishes can be misunderstood, or not followed. Treatments may be given even if you do not want them. An advance directive may make it easier for your family to make difficult choices about your care.   Weight Management   Why it is important to manage your weight:  Being overweight increases your risk of health conditions such as heart disease, high blood pressure, type 2  diabetes, and certain types of cancer. It can also increase your risk for osteoarthritis, sleep apnea, and other respiratory problems. Aim for a slow, steady weight loss. Even a small amount of weight loss can lower your risk of health problems.  How to lose weight safely:  A safe and healthy way to lose weight is to eat fewer calories and get regular exercise. You can lose up about 1 pound a week by decreasing the number of calories you eat by 500 calories each day.   Healthy meal plan for weight management:  A healthy meal plan includes a variety of foods, contains fewer calories, and helps you stay healthy. A healthy meal plan includes the following:  Eat whole-grain foods more often.  A healthy meal plan should contain fiber. Fiber is the part of grains, fruits, and vegetables that is not broken down by your body. Whole-grain foods are healthy and provide extra fiber in your diet. Some examples of whole-grain foods are whole-wheat breads and pastas, oatmeal, brown rice, and bulgur.  Eat a variety of vegetables every day.  Include dark, leafy greens such as spinach, kale, leroy greens, and mustard greens. Eat yellow and orange vegetables such as carrots, sweet potatoes, and winter squash.   Eat a variety of fruits every day.  Choose fresh or canned fruit (canned in its own juice or light syrup) instead of juice. Fruit juice has very little or no fiber.  Eat low-fat dairy foods.  Drink fat-free (skim) milk or 1% milk. Eat fat-free yogurt and low-fat cottage cheese. Try low-fat cheeses such as mozzarella and other reduced-fat cheeses.  Choose meat and other protein foods that are low in fat.  Choose beans or other legumes such as split peas or lentils. Choose fish, skinless poultry (chicken or turkey), or lean cuts of red meat (beef or pork). Before you cook meat or poultry, cut off any visible fat.   Use less fat and oil.  Try baking foods instead of frying them. Add less fat, such as margarine, sour cream,  regular salad dressing and mayonnaise to foods. Eat fewer high-fat foods. Some examples of high-fat foods include french fries, doughnuts, ice cream, and cakes.  Eat fewer sweets.  Limit foods and drinks that are high in sugar. This includes candy, cookies, regular soda, and sweetened drinks.  Exercise:  Exercise at least 30 minutes per day on most days of the week. Some examples of exercise include walking, biking, dancing, and swimming. You can also fit in more physical activity by taking the stairs instead of the elevator or parking farther away from stores. Ask your healthcare provider about the best exercise plan for you.      © Copyright [employer] 2018 Information is for End User's use only and may not be sold, redistributed or otherwise used for commercial purposes. All illustrations and images included in CareNotes® are the copyrighted property of A.D.A[employer]M., Inc[employer] or [employer]

## 2024-12-06 DIAGNOSIS — K21.9 GASTROESOPHAGEAL REFLUX DISEASE WITHOUT ESOPHAGITIS: ICD-10-CM

## 2024-12-06 RX ORDER — ESOMEPRAZOLE MAGNESIUM 40 MG/1
40 CAPSULE, DELAYED RELEASE ORAL DAILY
Qty: 90 CAPSULE | Refills: 1 | Status: SHIPPED | OUTPATIENT
Start: 2024-12-06 | End: 2024-12-12 | Stop reason: SDUPTHER

## 2024-12-06 NOTE — TELEPHONE ENCOUNTER
Reason for call:   [x] Refill   [] Prior Auth  [] Other:     Office:   [x] PCP/Provider - Chase Page, DO  [] Specialty/Provider -     Medication:   esomeprazole (NexIUM) 40 MG capsule       Dose/Frequency: 40 mg, Oral, Daily     Quantity: 90    Pharmacy: [facility] - [place], [place] -     Does the patient have enough for 3 days?   [x] Yes   [] No - Send as HP to POD

## 2024-12-07 ENCOUNTER — HOSPITAL ENCOUNTER (OUTPATIENT)
Dept: MRI IMAGING | Facility: HOSPITAL | Age: 74
Discharge: HOME | End: 2024-12-07
Attending: STUDENT IN AN ORGANIZED HEALTH CARE EDUCATION/TRAINING PROGRAM
Payer: MEDICARE

## 2024-12-07 DIAGNOSIS — M25.561 RIGHT KNEE PAIN, UNSPECIFIED CHRONICITY: ICD-10-CM

## 2024-12-07 PROCEDURE — 73721 MRI JNT OF LWR EXTRE W/O DYE: CPT

## 2024-12-10 ENCOUNTER — TELEPHONE (OUTPATIENT)
Dept: FAMILY MEDICINE CLINIC | Facility: CLINIC | Age: 74
End: 2024-12-10

## 2024-12-10 DIAGNOSIS — K21.9 GASTROESOPHAGEAL REFLUX DISEASE WITHOUT ESOPHAGITIS: Primary | ICD-10-CM

## 2024-12-10 NOTE — TELEPHONE ENCOUNTER
PA for esomeprazole (NexIUM) 40 MG capsule SUBMITTED to [facility]    via    []CMM-KEY:   [x]Surescripts-Case ID # [id number]  []Availity-Auth ID # NDC #   []Faxed to plan   []Other website   []Phone call Case ID #     []PA sent as URGENT    All office notes, labs and other pertaining documents and studies sent. Clinical questions answered. Awaiting determination from insurance company.     Turnaround time for your insurance to make a decision on your Prior Authorization can take 7-21 business days.

## 2024-12-10 NOTE — TELEPHONE ENCOUNTER
Prior auth needed  Esomeprazole Magnesium 40mg DR Endy [employer]- [id number]  [email or web address]    Thank you

## 2024-12-11 DIAGNOSIS — K21.9 GASTROESOPHAGEAL REFLUX DISEASE WITHOUT ESOPHAGITIS: ICD-10-CM

## 2024-12-11 RX ORDER — ESOMEPRAZOLE MAGNESIUM 40 MG/1
40 CAPSULE, DELAYED RELEASE ORAL DAILY
Qty: 90 CAPSULE | Refills: 0 | Status: CANCELLED | OUTPATIENT
Start: 2024-12-11

## 2024-12-11 NOTE — TELEPHONE ENCOUNTER
Zora with [employer] called. Requesting additional information for esomeprazole (NexIUM) 40 MG capsule Prior Auth. States Pt is required to have tried 3 alternatives such as omeprazole, lansoprazole, rabeprazole, and Dexlansoprazole. [employer] states it was documented that Pt has not tried these alternatives. Requesting documentation that Pt has either tried and failed 3 of these alternatives, there is a suspected drug/adverse interaction, or Pt is stable on the current ordered medication.     Please review and return call today. [facility]. Tel #[phone]

## 2024-12-11 NOTE — TELEPHONE ENCOUNTER
PA for esomeprazole (NexIUM) 40 MG capsule DENIED    Reason:(Screenshot if applicable)        Message sent to office clinical pool Yes    Denial letter scanned into Media Yes    Appeal started No (Provider will need to decide if appeal is warranted and send clinical documentation to Prior Authorization Team for initiation.)    **Please follow up with your patient regarding denial and next steps**

## 2024-12-11 NOTE — TELEPHONE ENCOUNTER
Reason for call:   [x] Refill   [] Prior Auth  [x] Other: [facility] without needing a prior auth    Office:   [x] PCP/Provider -   [] Specialty/Provider -     Medication: esomeprazole (NexIUM) 40 MG capsule     Dose/Frequency: 40 mg, Daily     Quantity: 90    [facility]    Does the patient have enough for 3 days?   [x] Yes   [] No - Send as HP to POD

## 2024-12-12 RX ORDER — ESOMEPRAZOLE MAGNESIUM 40 MG/1
40 CAPSULE, DELAYED RELEASE ORAL DAILY
Qty: 90 CAPSULE | Refills: 1 | Status: CANCELLED | OUTPATIENT
Start: 2024-12-12

## 2024-12-12 RX ORDER — ESOMEPRAZOLE MAGNESIUM 40 MG/1
40 CAPSULE, DELAYED RELEASE ORAL DAILY
Qty: 90 CAPSULE | Refills: 1 | Status: SHIPPED | OUTPATIENT
Start: 2024-12-12

## 2024-12-12 RX ORDER — RABEPRAZOLE SODIUM 20 MG/1
20 TABLET, DELAYED RELEASE ORAL DAILY
COMMUNITY
End: 2024-12-12 | Stop reason: CLARIF

## 2024-12-12 NOTE — TELEPHONE ENCOUNTER
Spoke with patient via telephone and advised of insurance denial. Pt prefers to stay on this medication and has paid [facility] in the past. Medication sent to Dr. Denise for approval.

## 2025-01-10 ENCOUNTER — TELEPHONE (OUTPATIENT)
Dept: BARIATRICS | Facility: CLINIC | Age: 75
End: 2025-01-10

## 2025-01-13 ENCOUNTER — OFFICE VISIT (OUTPATIENT)
Age: 75
End: 2025-01-13
Payer: MEDICARE

## 2025-01-13 VITALS — WEIGHT: 243 LBS | HEIGHT: 68 IN | BODY MASS INDEX: 36.83 KG/M2

## 2025-01-13 DIAGNOSIS — M70.51 PES ANSERINUS BURSITIS OF RIGHT KNEE: Primary | ICD-10-CM

## 2025-01-13 DIAGNOSIS — M17.0 PRIMARY OSTEOARTHRITIS OF BOTH KNEES: ICD-10-CM

## 2025-01-13 PROCEDURE — 20610 DRAIN/INJ JOINT/BURSA W/O US: CPT | Performed by: STUDENT IN AN ORGANIZED HEALTH CARE EDUCATION/TRAINING PROGRAM

## 2025-01-13 PROCEDURE — 99214 OFFICE O/P EST MOD 30 MIN: CPT | Performed by: STUDENT IN AN ORGANIZED HEALTH CARE EDUCATION/TRAINING PROGRAM

## 2025-01-13 RX ORDER — TRIAMCINOLONE ACETONIDE 40 MG/ML
40 INJECTION, SUSPENSION INTRA-ARTICULAR; INTRAMUSCULAR
Status: COMPLETED | OUTPATIENT
Start: 2025-01-13 | End: 2025-01-13

## 2025-01-13 RX ORDER — BUPIVACAINE HYDROCHLORIDE 5 MG/ML
2 INJECTION, SOLUTION EPIDURAL; INTRACAUDAL
Status: COMPLETED | OUTPATIENT
Start: 2025-01-13 | End: 2025-01-13

## 2025-01-13 RX ADMIN — BUPIVACAINE HYDROCHLORIDE 2 ML: 5 INJECTION, SOLUTION EPIDURAL; INTRACAUDAL at 11:15

## 2025-01-13 RX ADMIN — TRIAMCINOLONE ACETONIDE 40 MG: 40 INJECTION, SUSPENSION INTRA-ARTICULAR; INTRAMUSCULAR at 11:15

## 2025-01-13 NOTE — PROGRESS NOTES
Knee Follow up Office Note    Assessment:     1. Primary osteoarthritis of both knees          Plan:     Problem List Items Addressed This Visit    None  Visit Diagnoses         Primary osteoarthritis of both knees    -  Primary             74 y.o. male with bilateral knee mild osteoarthritis. Imaging and prognosis was reviewed with the patient today. MRI reviewed today showing that she has mild arthritic changes and no meniscus tear.  On physical exam today he  Discussed treatment options including continued observation, low impact exercises, bracing, anti-inflammatories, physical therapy, cortisone injection, visco injection, versus surgical intervention.       Subjective:     Patient ID: Heath Dumas is a 74 y.o. male.  Chief Complaint:  HPI:  74 y.o. male presents to the office for evaluation of bilateral knee pain.  He was referred to us by Dr. Higgins. He is experiencing bilateral anterior knee pain, right worse than left. His pain worsens with activities including transitioning from sit to stand and going up stairs. He also notes buckling and cracking. He has tried cortisone injections in the past, first injections lasted 7 months and the most recent ones only lasted roughly 1 month. He has also tried visco supplementation with mild relief.    Allergy:  Allergies   Allergen Reactions    Pollen Extract Allergic Rhinitis     Medications:  all current active meds have been reviewed  Past Medical History:  Past Medical History:   Diagnosis Date    AC joint arthropathy     right    Allergic 2018    Arthritis 2020    BPH (benign prostatic hyperplasia)     Carpal tunnel syndrome     bilateral    Chronic prostatitis     last assessed: 3/1/2014    Chronic UTI (urinary tract infection)     Colon polyp     Diverticulitis     twice    Ganglion     last assessed: 10/7/2013    GERD (gastroesophageal reflux disease)     GERD without esophagitis     Hyperlipidemia     Impingement syndrome of right shoulder     last assessed:  3/22/2016    Irritable bowel syndrome July 2022    Left inguinal hernia     description: repaired by Dr. 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    Pneumonia 2019    Pneumonia of left upper lobe due to infectious organism     last assessed: 2/8/2016    Sexual dysfunction     Stomach disorder     Urinary tract infection 2020    Wears glasses      Past Surgical History:  Past Surgical History:   Procedure Laterality Date    ABDOMINAL SURGERY  2010    COLONOSCOPY      COLONOSCOPY      CYSTOSCOPY      Diagnostic last assessed: 6/12/2014    CYSTOSTOMY      urethral stricture    HERNIA REPAIR      mesh left inguinal    NEUROPLASTY / TRANSPOSITION MEDIAN NERVE AT CARPAL TUNNEL      TX CYSTO INSERTION TRANSPROSTATIC IMPLANT SINGLE N/A 06/23/2020    Procedure: CYSTOSCOPY, URETHRAL DILATION,  WITH INSERTION UROLIFT;  Surgeon: Nabeel Silveira MD;  [facility];  Service: Urology    TX ENDOSCOPIC PLANTAR FASCIOTOMY Left 11/18/2016    Procedure: PLANTAR FASCIOTOMY;  Surgeon: Osmin Soto DPM;  [facility];  Service: Podiatry    TX EXCISION GANGLION WRIST DORSAL/VOLAR PRIMARY Left 01/24/2018    Procedure: WRIST RESECTION VOLAR GANGLION CYST;  Surgeon: Jessa Jackson MD;  [facility];  Service: Orthopedics    TX NEUROPLASTY &/TRANSPOS MEDIAN NRV CARPAL TUNNE Right 08/24/2016    Procedure: CARPAL TUNNEL RELEASE ;  Surgeon: Jessa Jackson MD;  [facility];  Service: Orthopedics    TX NEUROPLASTY &/TRANSPOS MEDIAN NRV CARPAL TUNNE Left 01/24/2018    Procedure: RELEASE CARPAL TUNNEL;  Surgeon: Jessa Jackson MD;  [facility];  Service: Orthopedics     Family History:  Family History   Problem Relation Age of Onset    Cancer Father         bladder    Colon polyps Neg Hx     Colon cancer Neg Hx      Social History:  Social History     Substance and Sexual Activity   Alcohol Use Yes    Alcohol/week: 3.0 standard drinks of alcohol    Types: 3 Standard drinks or equivalent per week    Comment:  social and denies alcohol use causing problems      Social History     Substance and Sexual Activity   Drug Use No     Social History     Tobacco Use   Smoking Status Former    Current packs/day: 0.00    Average packs/day: 1 pack/day for 44.3 years (44.3 ttl pk-yrs)    Types: Cigarettes    Start date: 9/15/1969    Quit date: 2014    Years since quittin.0   Smokeless Tobacco Former    Quit date: 2023         ROS:  General: Per HPI  Skin: Negative, except if noted below  HEENT: Negative  Respiratory: Negative  Cardiovascular: Negative  Gastrointestinal: Negative  Urinary: Negative  Vascular: Negative  Musculoskeletal: Positive per HPI   Neurologic: Positive per HPI  Endocrine: Negative    Objective:  BP Readings from Last 1 Encounters:   24 134/78      Wt Readings from Last 1 Encounters:   25 110 kg (243 lb)        Respiratory:   non-labored respirations    Lymphatics:  no palpable lymph nodes    Gait:   antalgic    Neurologic:   Alert and oriented times 3  Patient with normal sensation except as noted below  Deep tendon reflexes 2+ except as noted in MSK exam    Bilateral Lower Extremity:  Left Knee:      Inspection:  skin intact    Overall limb alignment neutral    Effusion: none    ROM 0-120 with pain    Extensor Lag: none    Palpation: mild medial Joint line tenderness to palpation    AP Stability at 90 deg stable    M/L stability in full extension stable    M/L stability in midflexion stable    Motor: 5/5 Q/HS/TA/GS/P    Pulses: 2+ DP / 2+ PT    SILT DP/SP/S/S/TN    Right knee:     Inspection:  skin intact    Overall limb alignment neutral    Effusion: none    ROM 0-120 with pain    Extensor Lag: none    Palpation: pes bursa TTP    AP Stability at 90 deg stable     M/L stability in full extension stable    M/L stability in midflexion stable    Motor: 5/5 Q/HS/TA/GS/P    Pulses: 2+ DP / 2+ PT    SILT DP/SP/S/S/TN    Imaging:  MRI right knee: mild cartilage loss MFC, no meniscal pathology.  "Intact ACL/PCL/MCL/LCL. Mild uptake medial hamstring insertion.     Large joint arthrocentesis: R anserine bursa  Universal Protocol:  Consent: Verbal consent obtained.  Risks and benefits: risks, benefits and alternatives were discussed  Consent given by: patient  Patient understanding: patient states understanding of the procedure being performed  Supporting Documentation  Indications: pain   Procedure Details  Location: knee - R anserine bursa  Preparation: Patient was prepped and draped in the usual sterile fashion  Needle size: 22 G  Approach: medial  Medications administered: 2 mL bupivacaine (PF) 0.5 %; 40 mg triamcinolone acetonide 40 mg/mL    Patient tolerance: patient tolerated the procedure well with no immediate complications  Dressing:  Sterile dressing applied            BMI:   Estimated body mass index is 37.06 kg/m² as calculated from the following:    Height as of this encounter: 5' 7.9\" (1.725 m).    Weight as of this encounter: 110 kg (243 lb).  BSA:   Estimated body surface area is 2.22 meters squared as calculated from the following:    Height as of this encounter: 5' 7.9\" (1.725 m).    Weight as of this encounter: 110 kg (243 lb).           Scribe Attestation      I,:   am acting as a scribe while in the presence of the attending physician.:       I,:   personally performed the services described in this documentation    as scribed in my presence.:              "

## 2025-03-19 ENCOUNTER — OFFICE VISIT (OUTPATIENT)
Dept: DERMATOLOGY | Facility: CLINIC | Age: 75
End: 2025-03-19
Payer: MEDICARE

## 2025-03-19 VITALS — TEMPERATURE: 97.7 F | WEIGHT: 247 LBS | BODY MASS INDEX: 37.44 KG/M2 | HEIGHT: 68 IN

## 2025-03-19 DIAGNOSIS — D22.71 MULTIPLE BENIGN MELANOCYTIC NEVI OF UPPER AND LOWER EXTREMITIES AND TRUNK: ICD-10-CM

## 2025-03-19 DIAGNOSIS — D22.5 MULTIPLE BENIGN MELANOCYTIC NEVI OF UPPER AND LOWER EXTREMITIES AND TRUNK: ICD-10-CM

## 2025-03-19 DIAGNOSIS — D18.01 CHERRY ANGIOMA: ICD-10-CM

## 2025-03-19 DIAGNOSIS — L82.1 SEBORRHEIC KERATOSIS: ICD-10-CM

## 2025-03-19 DIAGNOSIS — D48.5 NEOPLASM OF UNCERTAIN BEHAVIOR OF SKIN: ICD-10-CM

## 2025-03-19 DIAGNOSIS — D22.62 MULTIPLE BENIGN MELANOCYTIC NEVI OF UPPER AND LOWER EXTREMITIES AND TRUNK: ICD-10-CM

## 2025-03-19 DIAGNOSIS — L57.8 OTHER SKIN CHANGES DUE TO CHRONIC EXPOSURE TO NONIONIZING RADIATION: ICD-10-CM

## 2025-03-19 DIAGNOSIS — L72.0 EPIDERMAL INCLUSION CYST: ICD-10-CM

## 2025-03-19 DIAGNOSIS — D22.72 MULTIPLE BENIGN MELANOCYTIC NEVI OF UPPER AND LOWER EXTREMITIES AND TRUNK: ICD-10-CM

## 2025-03-19 DIAGNOSIS — D22.61 MULTIPLE BENIGN MELANOCYTIC NEVI OF UPPER AND LOWER EXTREMITIES AND TRUNK: ICD-10-CM

## 2025-03-19 DIAGNOSIS — L81.4 LENTIGINES: ICD-10-CM

## 2025-03-19 DIAGNOSIS — L57.0 KERATOSIS, ACTINIC: Primary | ICD-10-CM

## 2025-03-19 PROCEDURE — 11102 TANGNTL BX SKIN SINGLE LES: CPT | Performed by: DERMATOLOGY

## 2025-03-19 PROCEDURE — 99204 OFFICE O/P NEW MOD 45 MIN: CPT | Performed by: DERMATOLOGY

## 2025-03-19 PROCEDURE — 88305 TISSUE EXAM BY PATHOLOGIST: CPT | Performed by: STUDENT IN AN ORGANIZED HEALTH CARE EDUCATION/TRAINING PROGRAM

## 2025-03-19 RX ORDER — FLUOROURACIL 50 MG/G
CREAM TOPICAL 2 TIMES DAILY
Qty: 40 G | Refills: 0 | Status: SHIPPED | OUTPATIENT
Start: 2025-03-19

## 2025-03-19 NOTE — PATIENT INSTRUCTIONS
"EVANGELISTA ANGIOMAS          Assessment and Plan:  Based on a thorough discussion of this condition and the management approach to it (including a comprehensive discussion of the known risks, side effects and potential benefits of treatment), the patient (family) agrees to implement the following specific plan:  Reassure benign        SEBORRHEIC KERATOSIS; NON-INFLAMED          Assessment and Plan:  Based on a thorough discussion of this condition and the management approach to it (including a comprehensive discussion of the known risks, side effects and potential benefits of treatment), the patient (family) agrees to implement the following specific plan:  Reassure benign  Use sun protection.  Apply SPF 30 or higher at least three times a day.  Wear sun protecting clothing and hats.        SOLAR LENTIGINES   OTHER SKIN CHANGES DUE TO CHRONIC EXPOSURE TO NONIONIZING RADIATION             Assessment and Plan:  Based on a thorough discussion of this condition and the management approach to it (including a comprehensive discussion of the known risks, side effects and potential benefits of treatment), the patient (family) agrees to implement the following specific plan:  Reassure benign  Use sun protection.  Apply SPF 30 or higher at least three times a day.  Wear sun protecting clothing and hats.         MULTIPLE MELANOCYTIC NEVI (\"Moles\")          Assessment and Plan:  Based on a thorough discussion of this condition and the management approach to it (including a comprehensive discussion of the known risks, side effects and potential benefits of treatment), the patient (family) agrees to implement the following specific plan:  Reassure benign  Monitor for changes  Use sun protection.  Apply SPF 30 or higher at least three times a day.  Wear sun protecting clothing and hats.       Worrisome signs of skin malignancy discussed, questions answered. Regular self-skin check discussed. Advised to call or return to office if " patient notices any spots of concern, rapidly growing/changing lesions, bleeding lesions, non-healing lesions. Advised regular SPF use.      EPIDERMAL INCLUSION CYST      Assessment and Plan:  Based on a thorough discussion of this condition and the management approach to it (including a comprehensive discussion of the known risks, side effects and potential benefits of treatment), the patient (family) agrees to implement the following specific plan:  Reassured benign  If becomes bothersome to patient can be removed with an excision.     What are epidermal inclusion cysts?  Epidermal inclusion cysts are the most common, benign cutaneous cysts. There are many different names for epidermal inclusion cysts, including epidermoid cyst, epidermal cyst, infundibular cyst, inclusion cyst, and keratin cyst. These cysts can occur anywhere on the body and typically present as nodules directly underneath the skin. There is often a visible pore or opening in the center. The cysts are freely moveable and can range from a few millimeters to several centimeters in diameter. The center of epidermoid cysts almost always contains keratin, which has a cheesy appearance, and not sebum. They also do not originate from sebaceous glands. Therefore, epidermal inclusion cysts are not the same as sebaceous cysts.    Cysts may remain stable or progressively enlarge over time. There are no reliable predictive factors to tell if an epidermal inclusion cyst will enlarge, become inflamed, or remain quiescent. Infected cysts tend to become larger, turn red, and are more noticeable to the patient. There may be accompanying pain and discomfort.     What causes epidermal inclusion cysts?  Epidermal inclusion cysts often appear out of the blue and are not contagious. They are due to a proliferation of epidermal cells within the dermis and are more common in men than women. They occur more frequently in patients in their 20s to 40s. Epidermal inclusion  cysts by themselves are usually not inherited, but they can be hereditary in rare syndromes such as Mcgraw syndrome, nodular elastosis with cysts and comedones (Favre-Racouchot syndrome), and basal cell nevus syndrome (Gorlin syndrome). Elderly patients with chronic sun-damaged skin areas have a higher likelihood of developing epidermoid cysts. They often occur in areas where hair follicles have been inflamed or repeatedly irritated are more frequent in patients with acne vulgaris. In the  period, they are called milia.     Patients on BRAF inhibitors such as imiquimod and cyclosporine have a higher incidence of epidermoid cysts of the face.    How do we diagnose an epidermal inclusion cyst?  Epidermoid inclusion cysts are often diagnosed by history and physical exam. There is usually no need for biopsy prior to removal.  Radiographic and laboratory exams, such as ultrasound studies, are unnecessary and not typically ordered unless the practitioner suspects a genetic condition.    What is the treatment for an epidermal inclusion cyst?  Inflamed, uninfected epidermal inclusion cysts rarely resolve spontaneously without therapy or surgical intervention. Treatment is not emergent unless desired by the patient.     Definitive treatment is via surgical excision with walls intact. This method will prevent recurrence. This is best done when the cyst is not inflamed, to decrease the probability of rupture during surgery.   A local anesthetic will be injected around the cyst  A small incision is made in the skin overlying the cyst, and contents are expressed  The incision is repaired with sutures    Another option is to use a 4mm punch biopsy with cyst extraction through the defect.    Incision and drainage is often needed if the cyst is infected or inflamed. If there is surrounding cellulitis, oral antibiotic therapy may be necessary. The common agents used target methicillin sensitive Staphylococcal aureus and  "methicillin resistant S aureus in areas of high prevalence.       ACTINIC KERATOSIS          Plan:  PRESCRIPTION MANAGEMENT:  Several topical management options and their side effects were discussed including \"field therapies\" such as Efudex (5-fluorouracil) and/or Aldara (imiquimod). Efudex may be used alone or in combination with Calcipotriene. Begin treatment once regions that have been sprayed with liquid nitrogen are healed. Redness and irritation are to be expected within a few days of field therapy treatment and should resolve within 1 to 2 weeks following treatment. Do NOT cover the treatment areas with bandages. Use a sunscreen with an SPF 50+ while using field therapy.  We discussed the pre-cancerous nature of the condition. Actinic keratosis is found on sun-damaged skin and there is small risk that the condition could turn into a skin cancer called squamous cell carcinoma. There is no risk of actinic keratosis turning into melanoma.  Proliferative actinic keratosis tends to be resistant against standard treatments, including topical therapies and cryotherapy. It has a tendency to develop into infiltrative squamous cell carcinoma. Excision may be considered.  We discussed sun protection measures, including using sunscreen with an SPF 50+ year round, avoiding the sun, and wearing protective clothing such as long sleeves and pants when out in the sun.  Continue to monitor clinically for signs of recurrence. Discussed with patient the importance of keeping up to date with full body skin exams.  5-Fluorouracil: Apply 2 times daily for 2 weeks to the forehead. We discussed that 5-Fluorouracil will result in skin redness and irritation, which is expected. Begin 5-Fluorouracil treatment once regions that have been sprayed with liquid nitrogen are healed.     PROCEDURES PERFORMED TODAY ASSOCIATED WITH THIS CONDITION:          NONE                       NEOPLASM OF UNCERTAIN BEHAVIOR          Plan:  Shave biopsy " "       PROCEDURES PERFORMED TODAY ASSOCIATED WITH THIS CONDITION:          TANGENTIAL BIOPSY  PROCEDURE TANGENTIAL (SHAVE) BIOPSY NOTE:    Performing Physician:   Anatomic Location; Clinical Description with size (cm); Pre-Op Diagnosis:   Specimen A: 75 year old male; skin; shave; right chest; 1.5 cm pearly plaque;diff DDX: rule out BCC  Post-op diagnosis: Same     Local anesthesia: 1% xylocaine with epi      Topical anesthesia: None    Hemostasis: Aluminum chloride       After obtaining informed consent  at which time there was a discussion about the purpose of biopsy  and low risks of infection and bleeding.  The area was prepped and draped in the usual fashion. Anesthesia was obtained with 1% lidocaine with epinephrine. A shave biopsy to an appropriate sampling depth was obtained by Shave (Dermablade or 15 blade) The resulting wound was covered with surgical ointment and bandaged appropriately.     The patient tolerated the procedure well without complications and was without signs of functional compromise.      Specimen has been sent for review by Dermatopathology.    Standard post-procedure care has been explained and has been included in written form within the patient's copy of Informed Consent.    INFORMED CONSENT DISCUSSION AND POST-OPERATIVE INSTRUCTIONS FOR PATIENT    I.  RATIONALE FOR PROCEDURE  I understand that a skin biopsy allows the Dermatologist to test a lesion or rash under the microscope to obtain a diagnosis.  It usually involves numbing the area with numbing medication and removing a small piece of skin; sometimes the area will be closed with sutures. In this specific procedure, sutures are not usually needed.  If any sutures are placed, then they are usually need to be removed in 2 weeks or less.    I understand that my Dermatologist recommends that a skin \"shave\" biopsy be performed today.  A local anesthetic, similar to the kind that a dentist uses when filling a cavity, will be " "injected with a very small needle into the skin area to be sampled.  The injected skin and tissue underneath \"will go to sleep” and become numb so no pain should be felt afterwards.  An instrument shaped like a tiny \"razor blade\" (shave biopsy instrument) will be used to cut a small piece of tissue and skin from the area so that a sample of tissue can be taken and examined more closely under the microscope.  A slight amount of bleeding will occur, but it will be stopped with direct pressure and a pressure bandage and any other appropriate methods.  I understands that a scar will form where the wound was created.  Surgical ointment will be applied to help protect the wound.  Sutures are not usually needed.    II.  RISKS AND POTENTIAL COMPLICATIONS   I understand the risks and potential complications of a skin biopsy include but are not limited to the following:  Bleeding  Infection  Pain  Scar/keloid  Skin discoloration  Incomplete Removal  Recurrence  Nerve Damage/Numbness/Loss of Function  Allergic Reaction to Anesthesia  Biopsies are diagnostic procedures and based on findings additional treatment or evaluation may be required  Loss or destruction of specimen resulting in no additional findings    My Dermatologist has explained to me the nature of the condition, the nature of the procedure, and the benefits to be reasonably expected compared with alternative approaches.  My Dermatologist has discussed the likelihood of major risks or complications of this procedure including the specific risks listed above, such as bleeding, infection, and scarring/keloid.  I understand that a scar is expected after this procedure.  I understand that my physician cannot predict if the scar will form a \"keloid,\" which extends beyond the borders of the wound that is created.  A keloid is a thick, painful, and bumpy scar.  A keloid can be difficult to treat, as it does not always respond well to therapy, which includes injecting " "cortisone directly into the keloid every few weeks.  While this usually reduces the pain and size of the scar, it does not eliminate it.      I understand that photographs may be taken before and after the procedure.  These will be maintained as part of the medical providers confidential records and may not be made available to me.  I further authorize the medical provider to use the photographs for teaching purposes or to illustrate scientific papers, books, or lectures if in his/her judgment, medical research, education, or science may benefit from its use.    I have had an opportunity to fully inquire about the risks and benefits of this procedure and its alternatives.   I have been given ample time and opportunity to ask questions and to seek a second opinion if I wished to do so.  I acknowledge that there have specifically been no guarantees as to the cosmetic results from the procedure.  I am aware that with any procedure there is always the possibility of an unexpected complication.    III. POST-PROCEDURAL CARE (WHAT YOU WILL NEED TO DO \"AFTER THE BIOPSY\" TO OPTIMIZE HEALING)    Keep the area clean and dry.  Try NOT to remove the bandage or get it wet for the first 24 hours.    Gently clean the area and apply surgical ointment (such as Vaseline petrolatum ointment, which is available \"over the counter\" and not a prescription) to the biopsy site for up to 2 weeks straight.  This acts to protect the wound from the outside world.      Sutures are not usually placed in this procedure.  If any sutures were placed, return for suture removal as instructed (generally 1 week for the face, 2 weeks for the body).      Take Acetaminophen (Tylenol) for discomfort, if no contraindications.  Ibuprofen or aspirin could make bleeding worse.    Call our office immediately for signs of infection: fever, chills, increased redness, warmth, tenderness, discomfort/pain, or pus or foul smell coming from the wound.    WHAT TO DO IF " THERE IS ANY BLEEDING?  If a small amount of bleeding is noticed, place a clean cloth over the area and apply firm pressure for ten minutes.  Check the wound after 10 minutes of direct pressure.  If bleeding persists, try one more time for an additional 10 minutes of direct pressure on the area.  If the bleeding becomes heavier or does not stop after the second attempt, or if you have any other questions about this procedure, then please call your [facility]'s Dermatologist by calling [phone] (SKIN).     I hereby acknowledge that I have reviewed and verified the site with my Dermatologist and have requested and authorized my Dermatologist to proceed with the procedure.

## 2025-03-19 NOTE — PROGRESS NOTES
"[facility] Dermatology Clinic Note     Patient Name: Heath Dumas  Encounter Date: 3/19/25     Have you been cared for by a [facility] Dermatologist in the last 3 years and, if so, which description applies to you?    NO.   I am considered a \"new\" patient and must complete all patient intake questions. I am MALE/not capable of bearing children.    REVIEW OF SYSTEMS:  Have you recently had or currently have any of the following? Recent fever or chills? No  Any non-healing wound? No   PAST MEDICAL HISTORY:  Have you personally ever had or currently have any of the following?  If \"YES,\" then please provide more detail. Skin cancer (such as Melanoma, Basal Cell Carcinoma, Squamous Cell Carcinoma?  No  Tuberculosis, HIV/AIDS, Hepatitis B or C: No  Radiation Treatment No   HISTORY OF IMMUNOSUPPRESSION:   Do you have a history of any of the following:  Systemic Immunosuppression such as Diabetes, Biologic or Immunotherapy, Chemotherapy, Organ Transplantation, Bone Marrow Transplantation or Prednisone?  No     Answering \"YES\" requires the addition of the dotphrase \"IMMUNOSUPPRESSED\" as the first diagnosis of the patient's visit.   FAMILY HISTORY:  Any \"first degree relatives\" (parent, brother, sister, or child) with the following?    Skin Cancer, Pancreatic or Other Cancer? No   PATIENT EXPERIENCE:    Do you want the Dermatologist to perform a COMPLETE skin exam today including a clinical examination under the \"bra and underwear\" areas?  Yes  If necessary, do we have your permission to call and leave a detailed message on your Preferred Phone number that includes your specific medical information?  Yes      Allergies   Allergen Reactions    Pollen Extract Allergic Rhinitis      Current Outpatient Medications:     albuterol (PROVENTIL HFA,VENTOLIN HFA) 90 mcg/act inhaler, Inhale 2 puffs every 6 (six) hours as needed for wheezing, Disp: 18 g, Rfl: 3    Cholecalciferol (VITAMIN D PO), Take by mouth, Disp: , Rfl:     Coenzyme " "Q10 (CO Q 10 PO), Take 200 mg by mouth, Disp: , Rfl:     esomeprazole (NexIUM) 40 MG capsule, Take 1 capsule (40 mg total) by mouth daily, Disp: 90 capsule, Rfl: 1    levocetirizine (XYZAL) 5 MG tablet, Take 5 mg by mouth every evening, Disp: , Rfl:     linaCLOtide (Linzess) 145 MCG CAPS, Take 1 capsule (145 mcg total) by mouth daily, Disp: 30 capsule, Rfl: 2    polyethylene glycol (GLYCOLAX) 17 GM/SCOOP powder, Take 17 g by mouth daily, Disp: 850 g, Rfl: 3    simvastatin (ZOCOR) 40 mg tablet, TAKE 1 TABLET BY MOUTH ONCE DAILY AT BEDTIME, Disp: 90 tablet, Rfl: 3          Whom besides the patient is providing clinical information about today's encounter?   NO ADDITIONAL HISTORIAN (patient alone provided history)    Physical Exam and Assessment/Plan by Diagnosis:      CHERRY ANGIOMAS     Physical Exam:  Anatomic Location Affected:  Trunk and extremities  Morphological Description:  Scattered cherry red papules  Denies pain, itch, bleeding. No treatments tried. Present for years. Present constantly; no modifying factors which make it worse or better.     Assessment and Plan:  Based on a thorough discussion of this condition and the management approach to it (including a comprehensive discussion of the known risks, side effects and potential benefits of treatment), the patient (family) agrees to implement the following specific plan:  Reassure benign        SEBORRHEIC KERATOSIS; NON-INFLAMED     Physical Exam:  Anatomic Location Affected:  Trunk and extremities  Morphological Description:  Waxy, smooth to warty textured, yellow to brownish-grey to dark brown to blackish, discrete, \"stuck-on\" appearing papules.  Present for years. Denies pain, itch, bleeding.      Additional History of Present Condition:  Present constantly; no modifying factors which make it worse or better. No prior treatment.       Assessment and Plan:  Based on a thorough discussion of this condition and the management approach to it (including a " "comprehensive discussion of the known risks, side effects and potential benefits of treatment), the patient (family) agrees to implement the following specific plan:  Reassure benign  Use sun protection.  Apply SPF 30 or higher at least three times a day.  Wear sun protecting clothing and hats.        SOLAR LENTIGINES   OTHER SKIN CHANGES DUE TO CHRONIC EXPOSURE TO NONIONIZING RADIATION     Physical Exam:  Anatomic Location Affected:  Sun exposed areas of back, chest, arms, legs  Morphological Description:  Multiple scattered brown to tan evenly pigmented macules   Denies pain, itch, bleeding. No treatments tried. Present for months - years. Reports getting newer lesions with sun exposure.         Assessment and Plan:  Based on a thorough discussion of this condition and the management approach to it (including a comprehensive discussion of the known risks, side effects and potential benefits of treatment), the patient (family) agrees to implement the following specific plan:  Reassure benign  Use sun protection.  Apply SPF 30 or higher at least three times a day.  Wear sun protecting clothing and hats.         MULTIPLE MELANOCYTIC NEVI (\"Moles\")     Physical Exam:  Anatomic Location Affected: Trunk and extremities  Morphological Description:  Scattered, round to ovoid, symmetrical-appearing, even bordered, skin colored to dark brown macules/papules  Denies pain, itch, bleeding. No treatments tried. Present for years. Present constantly; no modifying factors which make it worse or better. Denies actively changing or growing moles.      Assessment and Plan:  Based on a thorough discussion of this condition and the management approach to it (including a comprehensive discussion of the known risks, side effects and potential benefits of treatment), the patient (family) agrees to implement the following specific plan:  Reassure benign  Monitor for changes  Use sun protection.  Apply SPF 30 or higher at least three times " "a day.  Wear sun protecting clothing and hats.       Worrisome signs of skin malignancy discussed, questions answered. Regular self-skin check discussed. Advised to call or return to office if patient notices any spots of concern, rapidly growing/changing lesions, bleeding lesions, non-healing lesions. Advised regular SPF use.      EPIDERMAL INCLUSION CYST    Physical Exam:  Anatomic Location Affected:  Left mid back  Morphological Description:  subcutaneous nodule      Additional History of Present Condition:  Present upon skin exam    Assessment and Plan:  Based on a thorough discussion of this condition and the management approach to it (including a comprehensive discussion of the known risks, side effects and potential benefits of treatment), the patient (family) agrees to implement the following specific plan:  Reassured benign  If becomes bothersome to patient can be removed with an excision.         ACTINIC KERATOSIS    Physical Exam:  Anatomic Location: Forehead  Morphologic Description:  Scaly pink papules      Additional History of Present Condition:  Present upon skin check      Plan:  PRESCRIPTION MANAGEMENT:  Several topical management options and their side effects were discussed including \"field therapies\" such as Efudex (5-fluorouracil) and/or Aldara (imiquimod). Efudex may be used alone or in combination with Calcipotriene. Begin treatment once regions that have been sprayed with liquid nitrogen are healed. Redness and irritation are to be expected within a few days of field therapy treatment and should resolve within 1 to 2 weeks following treatment. Do NOT cover the treatment areas with bandages. Use a sunscreen with an SPF 50+ while using field therapy.  We discussed the pre-cancerous nature of the condition. Actinic keratosis is found on sun-damaged skin and there is small risk that the condition could turn into a skin cancer called squamous cell carcinoma. There is no risk of actinic keratosis " turning into melanoma.  Proliferative actinic keratosis tends to be resistant against standard treatments, including topical therapies and cryotherapy. It has a tendency to develop into infiltrative squamous cell carcinoma. Excision may be considered.  We discussed sun protection measures, including using sunscreen with an SPF 50+ year round, avoiding the sun, and wearing protective clothing such as long sleeves and pants when out in the sun.  Continue to monitor clinically for signs of recurrence. Discussed with patient the importance of keeping up to date with full body skin exams.  5-Fluorouracil: Apply 2 times daily for 2 weeks to the forehead. We discussed that 5-Fluorouracil will result in skin redness and irritation, which is expected. Begin 5-Fluorouracil treatment once regions that have been sprayed with liquid nitrogen are healed. KEEP AWAY FROM PETS AND CHILDREN.     PROCEDURES PERFORMED TODAY ASSOCIATED WITH THIS CONDITION:          NONE                       NEOPLASM OF UNCERTAIN BEHAVIOR    Physical Exam:  (Anatomic Location); (Size and Morphological Description); (Differential Diagnosis):  Specimen A: 75 year old male; skin; shave; right chest; 1.5 cm pearly plaque;diff DDX: rule out BCC      Additional History of Present Condition:  Present upon skin exam            Plan:  Shave biopsy        PROCEDURES PERFORMED TODAY ASSOCIATED WITH THIS CONDITION:          TANGENTIAL BIOPSY  PROCEDURE TANGENTIAL (SHAVE) BIOPSY NOTE:    Performing Physician:   Anatomic Location; Clinical Description with size (cm); Pre-Op Diagnosis:   Specimen A: 75 year old male; skin; shave; right chest; 1.5 cm pearly plaque;diff DDX: rule out BCC  Post-op diagnosis: Same     Local anesthesia: 1% xylocaine with epi      Topical anesthesia: None    Hemostasis: Aluminum chloride       After obtaining informed consent  at which time there was a discussion about the purpose of biopsy  and low risks of infection and bleeding.   "The area was prepped and draped in the usual fashion. Anesthesia was obtained with 1% lidocaine with epinephrine. A shave biopsy to an appropriate sampling depth was obtained by Shave (Dermablade or 15 blade) The resulting wound was covered with surgical ointment and bandaged appropriately.     The patient tolerated the procedure well without complications and was without signs of functional compromise.      Specimen has been sent for review by Dermatopathology.    Standard post-procedure care has been explained and has been included in written form within the patient's copy of Informed Consent.    INFORMED CONSENT DISCUSSION AND POST-OPERATIVE INSTRUCTIONS FOR PATIENT    I.  RATIONALE FOR PROCEDURE  I understand that a skin biopsy allows the Dermatologist to test a lesion or rash under the microscope to obtain a diagnosis.  It usually involves numbing the area with numbing medication and removing a small piece of skin; sometimes the area will be closed with sutures. In this specific procedure, sutures are not usually needed.  If any sutures are placed, then they are usually need to be removed in 2 weeks or less.    I understand that my Dermatologist recommends that a skin \"shave\" biopsy be performed today.  A local anesthetic, similar to the kind that a dentist uses when filling a cavity, will be injected with a very small needle into the skin area to be sampled.  The injected skin and tissue underneath \"will go to sleep” and become numb so no pain should be felt afterwards.  An instrument shaped like a tiny \"razor blade\" (shave biopsy instrument) will be used to cut a small piece of tissue and skin from the area so that a sample of tissue can be taken and examined more closely under the microscope.  A slight amount of bleeding will occur, but it will be stopped with direct pressure and a pressure bandage and any other appropriate methods.  I understands that a scar will form where the wound was created.  Surgical " "ointment will be applied to help protect the wound.  Sutures are not usually needed.    II.  RISKS AND POTENTIAL COMPLICATIONS   I understand the risks and potential complications of a skin biopsy include but are not limited to the following:  Bleeding  Infection  Pain  Scar/keloid  Skin discoloration  Incomplete Removal  Recurrence  Nerve Damage/Numbness/Loss of Function  Allergic Reaction to Anesthesia  Biopsies are diagnostic procedures and based on findings additional treatment or evaluation may be required  Loss or destruction of specimen resulting in no additional findings    My Dermatologist has explained to me the nature of the condition, the nature of the procedure, and the benefits to be reasonably expected compared with alternative approaches.  My Dermatologist has discussed the likelihood of major risks or complications of this procedure including the specific risks listed above, such as bleeding, infection, and scarring/keloid.  I understand that a scar is expected after this procedure.  I understand that my physician cannot predict if the scar will form a \"keloid,\" which extends beyond the borders of the wound that is created.  A keloid is a thick, painful, and bumpy scar.  A keloid can be difficult to treat, as it does not always respond well to therapy, which includes injecting cortisone directly into the keloid every few weeks.  While this usually reduces the pain and size of the scar, it does not eliminate it.      I understand that photographs may be taken before and after the procedure.  These will be maintained as part of the medical providers confidential records and may not be made available to me.  I further authorize the medical provider to use the photographs for teaching purposes or to illustrate scientific papers, books, or lectures if in his/her judgment, medical research, education, or science may benefit from its use.    I have had an opportunity to fully inquire about the risks and " "benefits of this procedure and its alternatives.   I have been given ample time and opportunity to ask questions and to seek a second opinion if I wished to do so.  I acknowledge that there have specifically been no guarantees as to the cosmetic results from the procedure.  I am aware that with any procedure there is always the possibility of an unexpected complication.    III. POST-PROCEDURAL CARE (WHAT YOU WILL NEED TO DO \"AFTER THE BIOPSY\" TO OPTIMIZE HEALING)    Keep the area clean and dry.  Try NOT to remove the bandage or get it wet for the first 24 hours.    Gently clean the area and apply surgical ointment (such as Vaseline petrolatum ointment, which is available \"over the counter\" and not a prescription) to the biopsy site for up to 2 weeks straight.  This acts to protect the wound from the outside world.      Sutures are not usually placed in this procedure.  If any sutures were placed, return for suture removal as instructed (generally 1 week for the face, 2 weeks for the body).      Take Acetaminophen (Tylenol) for discomfort, if no contraindications.  Ibuprofen or aspirin could make bleeding worse.    Call our office immediately for signs of infection: fever, chills, increased redness, warmth, tenderness, discomfort/pain, or pus or foul smell coming from the wound.    WHAT TO DO IF THERE IS ANY BLEEDING?  If a small amount of bleeding is noticed, place a clean cloth over the area and apply firm pressure for ten minutes.  Check the wound after 10 minutes of direct pressure.  If bleeding persists, try one more time for an additional 10 minutes of direct pressure on the area.  If the bleeding becomes heavier or does not stop after the second attempt, or if you have any other questions about this procedure, then please call your [facility]'s Dermatologist by calling [phone] (SKIN).     I hereby acknowledge that I have reviewed and verified the site with my Dermatologist and have requested and authorized " my Dermatologist to proceed with the procedure.         Medical Complexity:    UNDIAGNOSED NEW PROBLEM WITH UNCERTAIN DIAGNOSIS.  A condition included in the differential diagnosis represents a high risk of morbidity without treatment.       Scribe Attestation      I,:  Kandis Brown am acting as a scribe while in the presence of the attending physician.:       I,:  Asim Villagomez MD personally performed the services described in this documentation    as scribed in my presence.:

## 2025-03-25 PROCEDURE — 88305 TISSUE EXAM BY PATHOLOGIST: CPT | Performed by: STUDENT IN AN ORGANIZED HEALTH CARE EDUCATION/TRAINING PROGRAM

## 2025-03-26 ENCOUNTER — RESULTS FOLLOW-UP (OUTPATIENT)
Dept: DERMATOLOGY | Facility: CLINIC | Age: 75
End: 2025-03-26

## 2025-03-26 NOTE — TELEPHONE ENCOUNTER
Patient returned phone call to discuss biopsy results. Patient is free all day today, he didn't recognize the area code so wasn't sure who was calling.

## 2025-04-09 ENCOUNTER — OFFICE VISIT (OUTPATIENT)
Dept: DERMATOLOGY | Facility: CLINIC | Age: 75
End: 2025-04-09
Payer: MEDICARE

## 2025-04-09 VITALS — WEIGHT: 253 LBS | TEMPERATURE: 98.4 F | BODY MASS INDEX: 38.58 KG/M2

## 2025-04-09 DIAGNOSIS — C44.519 BASAL CELL CARCINOMA (BCC) OF SKIN OF OTHER PART OF TORSO: Primary | ICD-10-CM

## 2025-04-09 PROCEDURE — 17262 DSTRJ MAL LES T/A/L 1.1-2.0: CPT | Performed by: DERMATOLOGY

## 2025-04-09 NOTE — PROGRESS NOTES
"[facility] Dermatology Clinic Note     Patient Name: Heath Dumas  Encounter Date: 4/9/2025     Have you been cared for by a [facility] Dermatologist in the last 3 years and, if so, which description applies to you?    Yes.  I have been here within the last 3 years, and my medical history has NOT changed since that time.  I am MALE/not capable of bearing children.    REVIEW OF SYSTEMS:  Have you recently had or currently have any of the following? No changes in my recent health.   PAST MEDICAL HISTORY:  Have you personally ever had or currently have any of the following?  If \"YES,\" then please provide more detail. No changes in my medical history.   HISTORY OF IMMUNOSUPPRESSION: Do you have a history of any of the following:  Systemic Immunosuppression such as Diabetes, Biologic or Immunotherapy, Chemotherapy, Organ Transplantation, Bone Marrow Transplantation or Prednisone?  No     Answering \"YES\" requires the addition of the dotphrase \"IMMUNOSUPPRESSED\" as the first diagnosis of the patient's visit.   FAMILY HISTORY:  Any \"first degree relatives\" (parent, brother, sister, or child) with the following?    No changes in my family's known health.   PATIENT EXPERIENCE:    Do you want the Dermatologist to perform a COMPLETE skin exam today including a clinical examination under the \"bra and underwear\" areas?  NO  If necessary, do we have your permission to call and leave a detailed message on your Preferred Phone number that includes your specific medical information?  Yes      Allergies   Allergen Reactions    Pollen Extract Allergic Rhinitis      Current Outpatient Medications:     albuterol (PROVENTIL HFA,VENTOLIN HFA) 90 mcg/act inhaler, Inhale 2 puffs every 6 (six) hours as needed for wheezing, Disp: 18 g, Rfl: 3    Cholecalciferol (VITAMIN D PO), Take by mouth, Disp: , Rfl:     Coenzyme Q10 (CO Q 10 PO), Take 200 mg by mouth, Disp: , Rfl:     esomeprazole (NexIUM) 40 MG capsule, Take 1 capsule (40 mg total) by " mouth daily, Disp: 90 capsule, Rfl: 1    fluorouracil (EFUDEX) 5 % cream, Apply topically 2 (two) times a day For up to 2 weeks to the forehead. KEEP AWAY FROM PETS AND SMALL CHILDREN. HIGHLY TOXIC, Disp: 40 g, Rfl: 0    levocetirizine (XYZAL) 5 MG tablet, Take 5 mg by mouth every evening, Disp: , Rfl:     polyethylene glycol (GLYCOLAX) 17 GM/SCOOP powder, Take 17 g by mouth daily, Disp: 850 g, Rfl: 3    simvastatin (ZOCOR) 40 mg tablet, TAKE 1 TABLET BY MOUTH ONCE DAILY AT BEDTIME, Disp: 90 tablet, Rfl: 3    linaCLOtide (Linzess) 145 MCG CAPS, Take 1 capsule (145 mcg total) by mouth daily, Disp: 30 capsule, Rfl: 2        Whom besides the patient is providing clinical information about today's encounter?   NO ADDITIONAL HISTORIAN (patient alone provided history)    Physical Exam and Assessment/Plan by Diagnosis:    CURETTAGE AND DESICCATION Malignant and Premalignant Lesion    Diagnosis: BCC  Prior biopsy: Yes  Access Number: [id number]  Location right chest  Size preop 2 cm  Size postop 1.7 cm    Additional History of Present Condition:  biopsy done on 3/19/2025.    Assessment and Plan:  Based on a thorough discussion of this condition and the management approach to it (including a comprehensive discussion of the known risks, side effects and potential benefits of treatment), the patient (family) agrees to treat the above described lesion with desiccation and curettage.    Procedure:  The area was cleanly  prepped in usual manner  Anesthesia:1% xylocaine with epi    The above described lesion was aggressively curetted to mid dermis followed by desiccation  Number of cycles of desiccation and curettage 3  A clean dry dressing was placed on site. Oral and written postop care instructions were discussed and reviewed    DISCUSSION OF TREATMENT AND POSTOP CARE FOR PATIENT    What is curettage and desiccation?  Curettage and desiccation is a type of electrosurgery in which a skin lesion is scraped off and heat is applied  to the skin surface.    What is involved in curettage and cautery?  Your doctor will explain to you why your skin lesion needs treatment and the procedure involved. You may have to sign a consent form to indicate that you consent to the surgical procedure. Tell your doctor if you are taking any medication, or if you have any allergies or medical conditions.  The doctor will inject some local anaesthetic into the area surrounding the lesion to be treated. This will make the skin go numb so no pain should be felt during the procedure. You may feel a pushing sensation but this should not be painful. The skin lesion is scraped off with a curette, which is like a small spoon with very sharp edges.  The lesion should be sent to a pathology laboratory for analysis. The wound surface is then cauterised with a hot wire beaded tip or electrosurgical unit (diathermy). This stops bleeding and helps destroys any remaining skin tumour cells.  This procedure is usually repeated twice for malignant skin lesions (serial curettage and cautery).  A dressing may be applied and instructions should be given on how to care for your wound.    What types of skin lesions can be treated by curettage?  Curettage is suitable to treat lesions where the material being scraped off is softer than the surrounding skin or when there is a natural cleavage plane between the lesion and the surrounding normal tissue. The following are sometimes treated by curettage:  Squamous cell carcinoma in situ   Actinic keratoses   Basal cell carcinomas that are large, deep or recurrent are usually not suitable for curettage. Lesions with poorly defined edges are also generally unsuitable.Curettage and desiccation is most often used in more superficial type basal cell carcinoma.    Will I have a scar?  Curettage often results in some sort of scar especially if accompanied by cautery.   The scars from curettage are usually flat and round. They are a similar size to  that of the original skin lesion. Some people have an abnormal response to skin healing and these people may get larger scars than usual (keloids and hypertrophic scarring).    How do I look after the wound following skin curettage?  The wound may be tender when the local anaesthetic wears off.  Leave the dressing in place till the nest day. Avoid strenuous exertion and stretching of the area.   If there is any bleeding, press on the wound firmly with a folded towel without looking at it for 30 minutes. If it is still bleeding after this time, seek medical attention.  Follow instructions a written in our consent ,  The wound from curettage will take approximately 2-3 weeks to heal over. The scar will initially be red and raised but usually reduces in colour and size over several months.    Scribe Attestation      I,:  Leah Beverly MA am acting as a scribe while in the presence of the attending physician.:       I,:  Asim Villagomez MD personally performed the services described in this documentation    as scribed in my presence.:

## 2025-05-27 ENCOUNTER — TELEPHONE (OUTPATIENT)
Dept: DERMATOLOGY | Facility: CLINIC | Age: 75
End: 2025-05-27

## 2025-06-02 DIAGNOSIS — K21.9 GASTROESOPHAGEAL REFLUX DISEASE WITHOUT ESOPHAGITIS: ICD-10-CM

## 2025-06-02 NOTE — TELEPHONE ENCOUNTER
Reason for call:   [x] Refill   [] Prior Auth  [] Other:     Office:   [x] PCP/Provider -   [] Specialty/Provider -     Medication: esomeprazole (NexIUM) 40 MG capsule Take 1 capsule (40 mg total) by mouth daily       Pharmacy: [facility] [address] [facility]   Does the patient have enough for 3 days?   [x] Yes   [] No - Send as HP to POD    Mail Away Pharmacy   Does the patient have enough for 10 days?   [] Yes   [] No - Send as HP to POD

## 2025-06-03 RX ORDER — ESOMEPRAZOLE MAGNESIUM 40 MG/1
40 CAPSULE, DELAYED RELEASE ORAL DAILY
Qty: 90 CAPSULE | Refills: 1 | Status: SHIPPED | OUTPATIENT
Start: 2025-06-03

## 2025-06-06 ENCOUNTER — TELEPHONE (OUTPATIENT)
Age: 75
End: 2025-06-06

## 2025-06-06 DIAGNOSIS — M17.0 PRIMARY OSTEOARTHRITIS OF BOTH KNEES: Primary | ICD-10-CM

## 2025-06-06 NOTE — TELEPHONE ENCOUNTER
Caller: Patient   Doctor/office: Dr Higgins and Dr. Perez (He saw both doctors. Dr. Higgins did his last Visco injection)    #: [phone]      Ed called to start auth/delivery for VISCO(Gel) injections.    R/L/Bilateral knee: b/l knee  Pain Level (scale 1-10): 5  Date of last Gel Injection: 8/28/24  Did the last injection work well for you? yes  Have you taken                   NSAIDS (Ibuprofen, Meloxicam, Naproxen)? no                   Tylenol? no  Have you done Home exercises/Physical Therapy? yes  Have you had a cortisone/steroid injection? yes      Educated patient on Visco procedure. Auth team will review insurance and process the request appropriately. Patient will be contacted if the have any questions and when ready to schedule.

## 2025-06-23 ENCOUNTER — PROCEDURE VISIT (OUTPATIENT)
Age: 75
End: 2025-06-23
Payer: MEDICARE

## 2025-06-23 VITALS — BODY MASS INDEX: 38.34 KG/M2 | WEIGHT: 253 LBS | HEIGHT: 68 IN

## 2025-06-23 DIAGNOSIS — M17.0 PRIMARY OSTEOARTHRITIS OF BOTH KNEES: Primary | ICD-10-CM

## 2025-06-23 PROCEDURE — 20610 DRAIN/INJ JOINT/BURSA W/O US: CPT | Performed by: PHYSICIAN ASSISTANT

## 2025-06-23 NOTE — PROGRESS NOTES
Assessment & Plan  Primary osteoarthritis of both knees  Durolane injection performed into the bilateral knees  Pain 7/10    Orders:    Large joint arthrocentesis: bilateral knee        Large joint arthrocentesis: bilateral knee    Performed by: Renee Bentley PA-C  Authorized by: Renee Bentley PA-C    Universal Protocol:  Consent: Verbal consent obtained  Risks and benefits: risks, benefits and alternatives were discussed  Consent given by: patient  Patient understanding: patient states understanding of the procedure being performed  Patient identity confirmed: verbally with patient  Supporting Documentation  Indications: pain     Is this a Visco injection? Yes  Non-Pharmacologic Treatments Attempted: Home Exercise  Pharmacologic Treatments Attempted: Cortisone, NSAIDs, tylenol  Pain Score: 7Procedure Details  Location: knee - bilateral knee  Preparation: Patient was prepped and draped in the usual sterile fashion  Needle size: 20 G  Approach: anterolateral    Medications (Right): 3 mL sodium hyaluronate 60 MG/3MLMedications (Left): 3 mL sodium hyaluronate 60 MG/3ML   Patient tolerance: patient tolerated the procedure well with no immediate complications  Dressing:  Sterile dressing applied            Renee Bentley PA-C

## 2025-06-24 ENCOUNTER — TELEPHONE (OUTPATIENT)
Age: 75
End: 2025-06-24

## 2025-06-24 NOTE — TELEPHONE ENCOUNTER
Called and spoke w/pt and he had on 6/23/25 B/L knee visco injections and woke up this morning w/both knees swollen. Denies redness, warmth, fever, chills. Advised sometimes that there is a flare up of inflammation and he should rest, ice, elevate, can use knee sleeves, offload w/cane. Pain level is 7-8/10. Advised Tylenol per label instructions if allowed not more than 3000mg in 24hours and or NSAIDS per label instructions if allowed like ibuprofen, Aleve, Advil, Motrin.  If ice does not help, then he can try heat.  Informed that it may take 2+ weeks to get full effect of Visco injections. Advised to take it easy for a few days. He would like a work excuse for today and tomorrow as he is having difficulty walking and pain.  Please place work note in MYCHART. Advised to CB w/any worsening symptoms or no improvement. Pt states he understands.

## 2025-06-24 NOTE — TELEPHONE ENCOUNTER
Called and spoke w/pt and relayed both TERESITA Bentley's msg and ABIODUN Galindo's msg. No further questions/concerns. CB if needed.

## 2025-06-24 NOTE — TELEPHONE ENCOUNTER
Caller: Patient    Doctor: Dr. Perez / Amanda    Reason for call: Patient is calling after having Visco injection done to BL knees and states they have both become very swollen this morning. Patient would like to know what to do and if this is normal, please advise.     Call back#: [phone]

## 2025-06-24 NOTE — TELEPHONE ENCOUNTER
He should try icing the knees and taking oral NSAIDs for pain and inflammation if he is able.  This should improve over the next few days, but continue to monitor for signs of infection or worsening symptoms.

## 2025-07-25 ENCOUNTER — NURSE TRIAGE (OUTPATIENT)
Age: 75
End: 2025-07-25

## 2025-07-25 DIAGNOSIS — R39.9 UTI SYMPTOMS: Primary | ICD-10-CM

## 2025-07-25 NOTE — TELEPHONE ENCOUNTER
REASON FOR CONVERSATION: Possible UTI    SYMPTOMS: Burning internally at the top of penis with urination, frequency with some hesitancy.  Spraying more then usual. Afebrile    OTHER HEALTH INFORMATION: Patient had urolift in 2020    PROTOCOL DISPOSITION: Next Available Appointment with Provider, Order Lab Work    CARE ADVICE PROVIDED:   Advised patient to avoid bladder irritating foods and beverages.  Maintain adequate hydration of water intake at least 64 ounces, if not on fluid restrictions.     ED precautions reviewed    Ordered urine testing-urine micro and culture.    PRACTICE FOLLOW-UP: Monitor for urine results.    Patient requested an appointment. He is going to [place] on 8/10 and wants to be seen. Office visit scheduled 7/30 per request.      Reason for Disposition   The patient has an unknown case of mild dysuria    Answer Assessment - Initial Assessment Questions  1. When did your symptoms start?   A couple weeks  2. Do you experience pain or burning with every void?   Burning at tip of penis internally  3. Do you have any other urinary symptoms such as urinary frequency, urgency, incontinence, blood in your urine?   Frequency with some hesitancy  4. Do you have any abdominal pain or flank pain?  denies  5. Do you have a fever of 101 or higher? How did you take your temperature?   denies  6. Does your urine stream spray? (Specifically for males)   Does spray  7. Have you become unable to urinate?   denies  8. Do you have a history of urinary tract infections?   yes  9. Have you had a recent urologic procedure, surgery, or any recent urine testing?   No    Protocols used: Urology-Dysuria-ADULT-[place]

## 2025-07-28 ENCOUNTER — RESULTS FOLLOW-UP (OUTPATIENT)
Dept: UROLOGY | Facility: HOSPITAL | Age: 75
End: 2025-07-28

## 2025-07-28 ENCOUNTER — APPOINTMENT (OUTPATIENT)
Dept: LAB | Facility: CLINIC | Age: 75
End: 2025-07-28
Payer: MEDICARE

## 2025-07-28 DIAGNOSIS — R39.9 UTI SYMPTOMS: ICD-10-CM

## 2025-07-28 DIAGNOSIS — R39.9 UTI SYMPTOMS: Primary | ICD-10-CM

## 2025-07-28 LAB
BACTERIA UR QL AUTO: ABNORMAL /HPF
BILIRUB UR QL STRIP: NEGATIVE
CLARITY UR: ABNORMAL
COLOR UR: YELLOW
GLUCOSE UR STRIP-MCNC: NEGATIVE MG/DL
HGB UR QL STRIP.AUTO: NEGATIVE
KETONES UR STRIP-MCNC: NEGATIVE MG/DL
LEUKOCYTE ESTERASE UR QL STRIP: ABNORMAL
MUCOUS THREADS UR QL AUTO: ABNORMAL
NITRITE UR QL STRIP: POSITIVE
NON-SQ EPI CELLS URNS QL MICRO: ABNORMAL /HPF
PH UR STRIP.AUTO: 6.5 [PH]
PROT UR STRIP-MCNC: NEGATIVE MG/DL
RBC #/AREA URNS AUTO: ABNORMAL /HPF
SP GR UR STRIP.AUTO: 1.02 (ref 1–1.03)
UROBILINOGEN UR STRIP-ACNC: <2 MG/DL
WBC #/AREA URNS AUTO: ABNORMAL /HPF

## 2025-07-28 PROCEDURE — 87086 URINE CULTURE/COLONY COUNT: CPT

## 2025-07-28 PROCEDURE — 81001 URINALYSIS AUTO W/SCOPE: CPT

## 2025-07-28 PROCEDURE — 87077 CULTURE AEROBIC IDENTIFY: CPT

## 2025-07-28 PROCEDURE — 87181 SC STD AGAR DILUTION PER AGT: CPT

## 2025-07-28 PROCEDURE — 87186 SC STD MICRODIL/AGAR DIL: CPT

## 2025-07-28 RX ORDER — NITROFURANTOIN 25; 75 MG/1; MG/1
100 CAPSULE ORAL 2 TIMES DAILY
Qty: 14 CAPSULE | Refills: 0 | Status: SHIPPED | OUTPATIENT
Start: 2025-07-28 | End: 2025-08-04

## 2025-07-29 NOTE — TELEPHONE ENCOUNTER
Kristie Hightower PA-C      7/28/25  2:24 PM  Note     Patient's UA appears positive for infection.  Patient with history of urosepsis.  Prophylactic antibiotics will be sent to the patient's pharmacy while awaiting final urine culture results.  Begin taking Macrobid 100 mg twice daily x 7 days.  Once final urine culture results we will reach out to the patient if a different antibiotic is recommended for more appropriate coverage.

## 2025-07-29 NOTE — TELEPHONE ENCOUNTER
Call placed. Left VM in detail per med com consent. Advised pt that an abx was sent to pt preferred pharmacy. Advised to start taking as prescribed and that our office will monitor for final culture results and adjust the abx if necessary. Advised if any other questions or concerns to give our office a call. Call back # provided.    Will monitor for final culture results

## 2025-07-30 ENCOUNTER — OFFICE VISIT (OUTPATIENT)
Dept: UROLOGY | Facility: MEDICAL CENTER | Age: 75
End: 2025-07-30
Payer: MEDICARE

## 2025-07-30 VITALS
RESPIRATION RATE: 21 BRPM | BODY MASS INDEX: 37.74 KG/M2 | WEIGHT: 249 LBS | HEART RATE: 95 BPM | OXYGEN SATURATION: 97 % | HEIGHT: 68 IN | DIASTOLIC BLOOD PRESSURE: 72 MMHG | SYSTOLIC BLOOD PRESSURE: 112 MMHG

## 2025-07-30 DIAGNOSIS — N30.00 ACUTE CYSTITIS WITHOUT HEMATURIA: Primary | ICD-10-CM

## 2025-07-30 PROCEDURE — 99213 OFFICE O/P EST LOW 20 MIN: CPT

## 2025-07-31 LAB — BACTERIA UR CULT: ABNORMAL

## 2025-08-01 DIAGNOSIS — N30.00 ACUTE CYSTITIS WITHOUT HEMATURIA: Primary | ICD-10-CM

## 2025-08-06 DIAGNOSIS — E78.5 HYPERLIPIDEMIA, UNSPECIFIED HYPERLIPIDEMIA TYPE: ICD-10-CM

## 2025-08-06 RX ORDER — SIMVASTATIN 40 MG
40 TABLET ORAL
Qty: 90 TABLET | Refills: 0 | Status: SHIPPED | OUTPATIENT
Start: 2025-08-06

## (undated) DEVICE — PREP SURGICAL PURPREP 26ML

## (undated) DEVICE — SCD SEQUENTIAL COMPRESSION COMFORT SLEEVE MEDIUM KNEE LENGTH: Brand: KENDALL SCD

## (undated) DEVICE — GLOVE SRG BIOGEL 7.5

## (undated) DEVICE — ACE WRAP 3 IN STERILE

## (undated) DEVICE — INTENDED FOR TISSUE SEPARATION, AND OTHER PROCEDURES THAT REQUIRE A SHARP SURGICAL BLADE TO PUNCTURE OR CUT.: Brand: BARD-PARKER SAFETY BLADES SIZE 15, STERILE

## (undated) DEVICE — TUBING SUCTION 5MM X 12 FT

## (undated) DEVICE — GLOVE INDICATOR PI UNDERGLOVE SZ 7.5 BLUE

## (undated) DEVICE — UROCATCH BAG

## (undated) DEVICE — PACK TUR

## (undated) DEVICE — BAG URINE DRAINAGE 2000ML ANTI RFLX LF

## (undated) DEVICE — U-DRAPE: Brand: CONVERTORS

## (undated) DEVICE — SUT ETHILON 4-0 PS-2 18 IN 1667H

## (undated) DEVICE — CURITY STRETCH BANDAGE: Brand: CURITY

## (undated) DEVICE — GLOVE SRG BIOGEL 7

## (undated) DEVICE — GUIDEWIRE STRGHT TIP 0.035 IN  SOLO PLUS

## (undated) DEVICE — SPLINT 4 X 15 IN FAST SET PLASTER

## (undated) DEVICE — INTENDED FOR TISSUE SEPARATION, AND OTHER PROCEDURES THAT REQUIRE A SHARP SURGICAL BLADE TO PUNCTURE OR CUT.: Brand: BARD-PARKER ® CARBON RIB-BACK BLADES

## (undated) DEVICE — GLOVE INDICATOR PI UNDERGLOVE SZ 8 BLUE

## (undated) DEVICE — OCCLUSIVE GAUZE STRIP,3% BISMUTH TRIBROMOPHENATE IN PETROLATUM BLEND: Brand: XEROFORM

## (undated) DEVICE — NEEDLE 25G X 1 1/2

## (undated) DEVICE — GAUZE SPONGES,16 PLY: Brand: CURITY

## (undated) DEVICE — GLOVE INDICATOR PI UNDERGLOVE SZ 7 BLUE

## (undated) DEVICE — PACK PLASTIC HAND PBDS

## (undated) DEVICE — 10FR FRAZIER SUCTION HANDLE: Brand: CARDINAL HEALTH

## (undated) DEVICE — CATH FOLEY 20FR 30ML 2 WAY SILICONE-ELASTIMER